# Patient Record
Sex: MALE | Race: BLACK OR AFRICAN AMERICAN | NOT HISPANIC OR LATINO | Employment: FULL TIME | URBAN - METROPOLITAN AREA
[De-identification: names, ages, dates, MRNs, and addresses within clinical notes are randomized per-mention and may not be internally consistent; named-entity substitution may affect disease eponyms.]

---

## 2017-10-24 ENCOUNTER — TRANSCRIBE ORDERS (OUTPATIENT)
Dept: ADMINISTRATIVE | Facility: HOSPITAL | Age: 52
End: 2017-10-24

## 2017-10-24 DIAGNOSIS — M54.50 ACUTE RIGHT-SIDED LOW BACK PAIN WITHOUT SCIATICA: Primary | ICD-10-CM

## 2020-05-13 LAB — EXT SARS-COV-2: NOT DETECTED

## 2020-10-05 ENCOUNTER — TELEPHONE (OUTPATIENT)
Dept: UROLOGY | Facility: AMBULATORY SURGERY CENTER | Age: 55
End: 2020-10-05

## 2021-02-01 ENCOUNTER — TELEPHONE (OUTPATIENT)
Dept: UROLOGY | Facility: CLINIC | Age: 56
End: 2021-02-01

## 2021-02-01 NOTE — TELEPHONE ENCOUNTER
Spoke to pt to convert 2/2 appt w Dr Melody Fernando to virtual - elevated PSA 4 56  Pt wants office visit ASAP    Please review to advise where pt can be rescheduled     I resched for 4/27 2pm BV Dr James Hunter

## 2021-02-02 NOTE — TELEPHONE ENCOUNTER
Patient notified of appt date , time and location - an email with this information was also sent to him at [email or web address]

## 2021-02-03 PROBLEM — R97.20 ELEVATED PSA, LESS THAN 10 NG/ML: Status: ACTIVE | Noted: 2021-02-03

## 2021-02-03 NOTE — PROGRESS NOTES
Problem List Items Addressed This Visit        Other    Elevated PSA, less than 10 ng/ml - Primary    Relevant Medications    bisacodyl (FLEET) 10 MG/30ML ENEM    ciprofloxacin (CIPRO) 500 mg tablet    Other Relevant Orders    PSA, total and free    POCT urine dip (Completed)    POCT Measure PVR (Completed)            I discussed with the patient the discovery of the PSA molecule and its original use in determining the return of prostate cancer after definitive therapy  I described the normal function of the PSA molecule in the reproductive process and also discussed the detection of PSA in the blood  We discussed the controversial history of PSA screening for prostate cancer in the [place] as well as the risk of over detection and over treatment of prostate cancer by way of PSA screening  The patient understands that PSA blood testing is an imperfect way to screen for prostate cancer and that elevated PSA levels in the blood may also be caused by infection, inflammation, prostatic trauma or manipulation, urological procedures, or by benign prostatic enlargement  The role of the digital rectal examination in prostate cancer screening was also discussed and I discussed with him that there is large interobserver variability in the findings of digital rectal examination  We discussed the continued workup of elevated PSA or abnormal digital rectal examination in the form of the performance of a prostate biopsy  The preparation for, and steps of, an office-based transrectal ultrasound of prostate biopsy were described to the patient  Benefits of obtaining tissue for pathologic analysis were discussed with him and the risks of prostate biopsy were also discussed at length  These risks include but are not limited to infection, bleeding, pain, sepsis with need for admission to hospital, risk of change in sexual function, and risk of diagnosis with prostate cancer    Alternatives to prostate biopsy in the form of continued PSA and MEKHI surveillance were also offered to him  All of his questions and concerns were answered and addressed with regard to that detailed above  He will return for prostate biopsy, repeat psa, pvr 0 mL          Assessment and plan:       Please see problem oriented charting for the assessment plan of today's urological complaints      Gema Johnson MD      Chief Complaint     Chief Complaint   Patient presents with    Elevated PSA         History of Present Illness     Candace Chilel February is a 63 y o  referred to us in consultation by Demario Guzman MD, for elevated PSA  Outside PSA reported to be elevated at 4 56    With regard to this complaint it is localized to the prostate  The quality is described as asymptomatic and the severity of this complaint is described as mild  These symptoms have been present for months and the timing is ongoing, states that there was some confusion about visit in October, hence his seeing us now  Previous treatments include none and previous work-up includes evaluation by his PCP  The patient mentions nothing as aggravating and alleviating factors, respectively  The following associated signs and symptoms are mentioned: none  Personal history is significant for no urologic history    Family history is significant for no history of prostate cancer  The following portions of the patient's history were reviewed and updated as appropriate: allergies, current medications, past family history, past medical history, past social history, past surgical history and problem list     Detailed Urologic History     - please refer to HPI    Review of Systems     Review of Systems   Constitutional: Negative  HENT: Negative  Eyes: Negative  Respiratory: Negative  Cardiovascular: Negative  Gastrointestinal: Negative  Endocrine: Negative  Genitourinary: Negative  Musculoskeletal: Negative  Skin: Negative      Allergic/Immunologic: Negative  Neurological: Negative  Hematological: Negative  Psychiatric/Behavioral: Negative  Allergies     No Known Allergies    Physical Exam     Physical Exam  Vitals signs reviewed  Constitutional:       General: He is not in acute distress  Appearance: Normal appearance  He is not ill-appearing, toxic-appearing or diaphoretic  Comments: Pleasant black man   HENT:      Head: Normocephalic and atraumatic  Eyes:      General: No scleral icterus  Right eye: No discharge  Left eye: No discharge  Cardiovascular:      Pulses: Normal pulses  Pulmonary:      Effort: Pulmonary effort is normal    Abdominal:      General: There is no distension  Palpations: There is no mass  Tenderness: There is no abdominal tenderness  Hernia: No hernia is present  Genitourinary:     Comments: Normal sphincter tone, prostate 35-40 grams, smooth, no nodules  Musculoskeletal:         General: No swelling  Skin:     Coloration: Skin is not jaundiced or pale  Neurological:      General: No focal deficit present  Mental Status: He is alert and oriented to person, place, and time  Cranial Nerves: No cranial nerve deficit  Sensory: No sensory deficit  Motor: No weakness  Coordination: Coordination normal       Gait: Gait normal       Deep Tendon Reflexes: Reflexes normal    Psychiatric:         Mood and Affect: Mood normal          Behavior: Behavior normal          Thought Content:  Thought content normal          Judgment: Judgment normal              Vital Signs  Vitals:    02/04/21 1118   BP: 138/70   Pulse: 91   Weight: 97 5 kg (215 lb)   Height: 5' 9" (1 753 m)         Current Medications       Current Outpatient Medications:     amLODIPine (NORVASC) 5 mg tablet, amlodipine 5 mg tablet  one tab daily, Disp: , Rfl:     fluticasone (FLONASE) 50 mcg/act nasal spray, fluticasone propionate 50 mcg/actuation nasal spray,suspension  one puff each nostril daily prn , Disp: , Rfl:     hydrochlorothiazide (HYDRODIURIL) 25 mg tablet, hydrochlorothiazide 25 mg tablet  one tab daily, Disp: , Rfl:     losartan (COZAAR) 100 MG tablet, losartan 100 mg tablet, Disp: , Rfl:     Na Sulfate-K Sulfate-Mg Sulf (Suprep Bowel Prep Kit) 17 5-3 13-1 6 GM/177ML SOLN, Suprep Bowel Prep Kit 17 5 gram-3 13 gram-1 6 gram oral solution, Disp: , Rfl:     bisacodyl (FLEET) 10 MG/30ML ENEM, Insert 30 mL (10 mg total) into the rectum once for 1 dose Use this enema the morning of your prostate biopsy to cleanse your rectum of stool burden , Disp: 1 enema, Rfl: 0    ciprofloxacin (CIPRO) 500 mg tablet, Take 1 tablet (500 mg total) by mouth every 12 (twelve) hours for 1 day Start this medication the day prior to your prostate biopsy, Disp: 2 tablet, Rfl: 0      Active Problems     Patient Active Problem List   Diagnosis    Elevated PSA, less than 10 ng/ml         Past Medical History     Past Medical History:   Diagnosis Date    Elevated PSA     Hypertension          Surgical History     History reviewed  No pertinent surgical history        Family History     Family History   Problem Relation Age of Onset    Hypertension Father          Social History     Social History     Social History     Tobacco Use   Smoking Status Former Smoker   Smokeless Tobacco Never Used   works as an   Used to play soccer for fun  No hobbies involving chemicals or paint or dye      Pertinent Lab Values     No results found for: CREATININE    No results found for: PSA    PSA 4 56 from 9/24/2020      Pertinent Imaging      no imaging for my review

## 2021-02-03 NOTE — PATIENT INSTRUCTIONS
Prostate Gland Needle Biopsy   WHAT YOU NEED TO KNOW:   A prostate gland needle biopsy is a procedure to remove samples of tissue from your prostate gland  The prostate is a gland located just below the bladder and surrounds the urethra (tube that carries urine out of the body)  HOW TO PREPARE:   Before your procedure:   · Arrange to have someone drive you home after your procedure  · Tell your surgeon about all medicines you currently take  He or she will tell you if you need to stop any medicine before your procedure, and when to stop  He or she will tell you which medicines to take or not take on the day of your procedure  The night before your procedure: You may be told not to eat or drink anything after midnight  The day of your procedure:   · You or a close family member will be asked to sign a legal document called a consent form  It gives healthcare providers permission to do the procedure or surgery  It also explains the problems that may happen, and your choices  Make sure all your questions are answered before you sign this form  · Healthcare providers may insert an intravenous tube (IV) into your vein  A vein in the arm is usually chosen  Through the IV tube, you may be given liquids and medicine  · You may be given an enema (liquid medicine put in your rectum) to help empty your bowel  · An anesthesiologist will talk to you before your surgery  You may need medicine to keep you asleep or numb an area of your body during surgery  Tell healthcare providers if you or anyone in your family has had a problem with anesthesia in the past     WHAT WILL HAPPEN:   What will happen:   · You may be given general anesthesia to keep you asleep and free from pain during surgery  You may instead be given spinal anesthesia to numb the surgery area  You may still feel pressure or pushing during surgery, but you will not feel any pain   Numbing gel or shots of numbing medicine may also be given near your prostate to numb the area  · A small tube with a camera will be put into your rectum to show pictures of your prostate on a monitor  A biopsy needle will be put in through your rectum into your prostate gland  A small sample of tissue will be removed with the needle  Your surgeon may take between 6 to 12 samples of tissue from different areas of your prostate gland  A new needle will be used to take each tissue sample  Each sample will be sent to a lab  After your procedure: You will be able to rest until you are fully awake  Do not  get out of bed until your healthcare provider says it is okay  Once healthcare providers see that you are not having any problems, you may be able to go home  CONTACT YOUR HEALTHCARE PROVIDER IF:   · You are late or cannot make it to your procedure  · You have a fever  RISKS:   You may bleed more than expected or get an infection  Your bladder, prostate, urethra, and nearby tissues or organs may be damaged during the procedure  You may have bruises on your rectum  You may have blood in your urine, bowel movements, or semen  If you have prostate cancer, the biopsy may not show the cancer  The biopsy may show cancer when there is no cancer in your prostate gland  You may need another prostate biopsy  CARE AGREEMENT:   You have the right to help plan your care  Learn about your health condition and how it may be treated  Discuss treatment options with your healthcare providers to decide what care you want to receive  You always have the right to refuse treatment  © Copyright [address] Information is for End User's use only and may not be sold, redistributed or otherwise used for commercial purposes  All illustrations and images included in CareNotes® are the copyrighted property of [employer]  or [facility] Marlyn Mcbride   The above information is an  only  It is not intended as medical advice for individual conditions or treatments   Talk to your doctor, nurse or pharmacist before following any medical regimen to see if it is safe and effective for you

## 2021-02-04 ENCOUNTER — TELEPHONE (OUTPATIENT)
Dept: UROLOGY | Facility: CLINIC | Age: 56
End: 2021-02-04

## 2021-02-04 ENCOUNTER — CONSULT (OUTPATIENT)
Dept: UROLOGY | Facility: CLINIC | Age: 56
End: 2021-02-04
Payer: COMMERCIAL

## 2021-02-04 ENCOUNTER — LAB (OUTPATIENT)
Dept: LAB | Facility: HOSPITAL | Age: 56
End: 2021-02-04
Attending: UROLOGY
Payer: COMMERCIAL

## 2021-02-04 VITALS
SYSTOLIC BLOOD PRESSURE: 138 MMHG | HEART RATE: 91 BPM | HEIGHT: 69 IN | DIASTOLIC BLOOD PRESSURE: 70 MMHG | BODY MASS INDEX: 31.84 KG/M2 | WEIGHT: 215 LBS

## 2021-02-04 DIAGNOSIS — R97.20 ELEVATED PSA, LESS THAN 10 NG/ML: ICD-10-CM

## 2021-02-04 DIAGNOSIS — R97.20 ELEVATED PSA, LESS THAN 10 NG/ML: Primary | ICD-10-CM

## 2021-02-04 LAB
POST-VOID RESIDUAL VOLUME, ML POC: 0 ML
SL AMB  POCT GLUCOSE, UA: NORMAL
SL AMB LEUKOCYTE ESTERASE,UA: NORMAL
SL AMB POCT BILIRUBIN,UA: NORMAL
SL AMB POCT BLOOD,UA: NORMAL
SL AMB POCT CLARITY,UA: CLEAR
SL AMB POCT COLOR,UA: YELLOW
SL AMB POCT KETONES,UA: NORMAL
SL AMB POCT NITRITE,UA: NORMAL
SL AMB POCT PH,UA: 7
SL AMB POCT SPECIFIC GRAVITY,UA: 1.01
SL AMB POCT URINE PROTEIN: NORMAL
SL AMB POCT UROBILINOGEN: 0.2

## 2021-02-04 PROCEDURE — 36415 COLL VENOUS BLD VENIPUNCTURE: CPT

## 2021-02-04 PROCEDURE — 84153 ASSAY OF PSA TOTAL: CPT

## 2021-02-04 PROCEDURE — 51798 US URINE CAPACITY MEASURE: CPT | Performed by: UROLOGY

## 2021-02-04 PROCEDURE — 81002 URINALYSIS NONAUTO W/O SCOPE: CPT | Performed by: UROLOGY

## 2021-02-04 PROCEDURE — 99244 OFF/OP CNSLTJ NEW/EST MOD 40: CPT | Performed by: UROLOGY

## 2021-02-04 PROCEDURE — 84154 ASSAY OF PSA FREE: CPT

## 2021-02-04 RX ORDER — CIPROFLOXACIN 500 MG/1
500 TABLET, FILM COATED ORAL EVERY 12 HOURS SCHEDULED
Qty: 2 TABLET | Refills: 0 | Status: SHIPPED | OUTPATIENT
Start: 2021-02-04 | End: 2021-02-05

## 2021-02-04 RX ORDER — SODIUM, POTASSIUM,MAG SULFATES 17.5-3.13G
SOLUTION, RECONSTITUTED, ORAL ORAL
COMMUNITY
End: 2021-05-24

## 2021-02-05 LAB
PSA FREE MFR SERPL: 9.8 %
PSA FREE SERPL-MCNC: 0.46 NG/ML
PSA SERPL-MCNC: 4.7 NG/ML (ref 0–4)

## 2021-03-01 RX ORDER — CEFTRIAXONE 1 G/1
1000 INJECTION, POWDER, FOR SOLUTION INTRAMUSCULAR; INTRAVENOUS ONCE
Status: COMPLETED | OUTPATIENT
Start: 2021-03-04 | End: 2021-03-04

## 2021-03-01 NOTE — PATIENT INSTRUCTIONS
Prostate Biopsy   WHAT YOU NEED TO KNOW:   A prostate biopsy is a procedure to remove samples of tissue from your prostate gland  The prostate is a male sex gland that makes fluid found in semen  It is located just below the bladder  After the samples are removed, they are sent to a lab and tested for cancer  DISCHARGE INSTRUCTIONS:   Seek care immediately if:   · You have heavy bleeding from your rectum  · You urinate very little or not at all  · You have pain from your procedure that gets worse, even after you take pain medicine  Contact your healthcare provider if:   · You have a fever or chills  · You feel pain or burning when you urinate  · Your urine is cloudy or smells bad  · You have questions or concerns about your condition or care  Medicines:  · Medicines  can help decrease pain  You may need medicine to prevent or treat a bacterial infection  Ask how to take pain medicine safely  · Take your medicine as directed  Contact your healthcare provider if you think your medicine is not helping or if you have side effects  Tell him or her if you are allergic to any medicine  Keep a list of the medicines, vitamins, and herbs you take  Include the amounts, and when and why you take them  Bring the list or the pill bottles to follow-up visits  Carry your medicine list with you in case of an emergency  Follow up with your healthcare provider or urologist as directed: You may need to return for more tests or procedures  Write down your questions so you remember to ask them during your visits  © Copyright [address] Information is for End User's use only and may not be sold, redistributed or otherwise used for commercial purposes  All illustrations and images included in CareNotes® are the copyrighted property of [employer]  or [facility] Marlyn Mcneill  The above information is an  only   It is not intended as medical advice for individual conditions or treatments  Talk to your doctor, nurse or pharmacist before following any medical regimen to see if it is safe and effective for you

## 2021-03-01 NOTE — PROGRESS NOTES
Office TRUS-guided Prostate Biopsy Procedure Note    Indication    Elevated PSA    Informed consent   The risks, benefits and alternatives to TRUS-guided prostate biopsy were conveyed to the patient prior to performing the procedure  A discussion of the risks of the procedure included, but was not limited to: pain, hematuria, hematochezia, hematospermia, infection, and the possibility of a non-diagnostic biopsy  The patient was given the opportunity to have his questions answered and there was no perceived barrier to education  Antibiotic prophylaxis   The patient received the following antibiotics at least 30 minutes prior to undergoing biopsy: Cipro and ceftriaxone  The patient was instructed to continue taking the antibiotics as prescribed for a total of 3 days, including the day of biopsy  Rectal cleansing  The patient was instructed to perform an evacuating rectal enema 1-2 hours prior to biopsy  Local anesthesia  Topical 2% lidocaine jelly was applied liberally to the anus and rectum and allowed to dwell for at least 5 min prior to starting the procedure  After insertion of the TRUS probe, 10 mL of 2% lidocaine solution was injected with ultrasound guidance at the  junction of the prostate and seminal vesicles  The anesthetic was allowed to dwell for at least 2 minutes prior to biopsy  Transrectal ultrasonography  The patient was placed in the left lateral decubitus position  After an attentive digital rectal examination, a 7 5 mHz sidefire ultrasound probe was gently inserted into the rectum and biplanar imaging of the prostate was done with the findings noted below  Images were taken of any abnormal findings and also to document prostate size      Bladder  The bladder base appeared normal     Prostate  Digital rectal exam findings:  - Prostate is roughly 20 grams, smooth, no nodules    Ultrasound size measurements:  -Volume:    21 06 cm3    Ultrasound findings:  -Cysts: None  -Masses: None  -Median lobe: absent    Clinical stage (assuming a positive biopsy):   -T1c     TRUS-guided needle biopsy  Using an 18 gauge biopsy needle and ultrasound guidance, the following biopsies were taken:    1 core(s) from the left lateral base  1 core(s) from the left lateral mid-gland  1 core(s) from the right middle base  1 core(s) from the right lateral base  1 core(s) from the left lateral mid-gland  1 core(s) from the left middle mid-gland  1 core(s) from the right middle mid-gland  1 core(s) from the right lateral mid-gland  1 core(s) from the left lateral apex  1 core(s) from the left middle apex  1 core(s) from the right middle apex  1 core(s) from the right lateral apex    Total number of cores: 12                Complications  There were no procedural complications  Disposition  The patient was dismissed to home     Post-procedure instructions: Today he underwent an uncomplicated transrectal ultrasound-guided biopsy of the prostate, following a periprosthetic nerve block  I reviewed the normal postprocedure a course including bleeding per rectum, hematuria, and hematospermia  I instructed him to complete his course of antibiotics as prescribed  Instructed him to call with fever greater than 101, chills, nausea, vomiting, and poorly controlled pain  His followup was scheduled in approximately 2 weeks' time to review the pathology  Biopsy prostate     Date/Time 3/4/2021 8:58 AM     Performed by  Zafar Garay MD     Authorized by Zafar Garay MD      Universal Protocol   Consent: Verbal consent obtained  Written consent obtained    Risks and benefits: risks, benefits and alternatives were discussed  Consent given by: patient  Patient understanding: patient states understanding of the procedure being performed  Patient consent: the patient's understanding of the procedure matches consent given  Procedure consent: procedure consent matches procedure scheduled  Relevant documents: relevant documents present and verified  Test results: test results available and properly labeled  Site marked: the operative site was not marked  Radiology Images displayed and confirmed  If images not available, report reviewed: imaging studies available  Required items: required blood products, implants, devices, and special equipment available  Patient identity confirmed: verbally with patient and provided demographic data        Local anesthesia used: yes     Anesthesia   Local anesthesia used: yes  Local Anesthetic: lidocaine 2% without epinephrine     Sedation   Patient sedated: no        Specimen: yes    Culture: no   Procedure Details   Procedure Notes: Elevated PSA    Informed consent   The risks, benefits and alternatives to TRUS-guided prostate biopsy were conveyed to the patient prior to performing the procedure  A discussion of the risks of the procedure included, but was not limited to: pain, hematuria, hematochezia, hematospermia, infection, and the possibility of a non-diagnostic biopsy  The patient was given the opportunity to have his questions answered and there was no perceived barrier to education  Antibiotic prophylaxis   The patient received the following antibiotics at least 30 minutes prior to undergoing biopsy: Cipro and ceftriaxone  The patient was instructed to continue taking the antibiotics as prescribed for a total of 3 days, including the day of biopsy  Rectal cleansing  The patient was instructed to perform an evacuating rectal enema 1-2 hours prior to biopsy  Local anesthesia  Topical 2% lidocaine jelly was applied liberally to the anus and rectum and allowed to dwell for at least 5 min prior to starting the procedure  After insertion of the TRUS probe, 10 mL of 2% lidocaine solution was injected with ultrasound guidance at the  junction of the prostate and seminal vesicles   The anesthetic was allowed to dwell for at least 2 minutes prior to biopsy  Transrectal ultrasonography  The patient was placed in the left lateral decubitus position  After an attentive digital rectal examination, a 7 5 mHz sidefire ultrasound probe was gently inserted into the rectum and biplanar imaging of the prostate was done with the findings noted below  Images were taken of any abnormal findings and also to document prostate size  Bladder  The bladder base appeared normal     Prostate  Digital rectal exam findings:  - Prostate is roughly 20 grams, smooth, no nodules    Ultrasound size measurements:  -Volume:    21 06 cm3    Ultrasound findings:  -Cysts: None  -Masses: None  -Median lobe: absent    Clinical stage (assuming a positive biopsy):   -T1c     TRUS-guided needle biopsy  Using an 18 gauge biopsy needle and ultrasound guidance, the following biopsies were taken:    1 core(s) from the left lateral base  1 core(s) from the left lateral mid-gland  1 core(s) from the right middle base  1 core(s) from the right lateral base  1 core(s) from the left lateral mid-gland  1 core(s) from the left middle mid-gland  1 core(s) from the right middle mid-gland  1 core(s) from the right lateral mid-gland  1 core(s) from the left lateral apex  1 core(s) from the left middle apex  1 core(s) from the right middle apex  1 core(s) from the right lateral apex    Total number of cores: 12                Complications  There were no procedural complications  Disposition  The patient was dismissed to home     Post-procedure instructions: Today he underwent an uncomplicated transrectal ultrasound-guided biopsy of the prostate, following a periprosthetic nerve block  I reviewed the normal postprocedure a course including bleeding per rectum, hematuria, and hematospermia  I instructed him to complete his course of antibiotics as prescribed  Instructed him to call with fever greater than 101, chills, nausea, vomiting, and poorly controlled pain   His followup was scheduled in approximately 2 weeks' time to review the pathology    Patient Transportation: confirmed  Patient tolerance: patient tolerated the procedure well with no immediate complications

## 2021-03-04 ENCOUNTER — PROCEDURE VISIT (OUTPATIENT)
Dept: UROLOGY | Facility: CLINIC | Age: 56
End: 2021-03-04
Payer: COMMERCIAL

## 2021-03-04 DIAGNOSIS — R97.20 ELEVATED PSA, LESS THAN 10 NG/ML: Primary | ICD-10-CM

## 2021-03-04 PROCEDURE — G0416 PROSTATE BIOPSY, ANY MTHD: HCPCS | Performed by: PATHOLOGY

## 2021-03-04 PROCEDURE — 55700 PR BIOPSY OF PROSTATE,NEEDLE/PUNCH: CPT | Performed by: UROLOGY

## 2021-03-04 PROCEDURE — 76942 ECHO GUIDE FOR BIOPSY: CPT | Performed by: UROLOGY

## 2021-03-04 PROCEDURE — 96372 THER/PROPH/DIAG INJ SC/IM: CPT

## 2021-03-04 RX ADMIN — CEFTRIAXONE 1000 MG: 1 INJECTION, POWDER, FOR SOLUTION INTRAMUSCULAR; INTRAVENOUS at 08:34

## 2021-03-04 NOTE — LETTER
March 4, 2021     Sabina Edwards MD  [address] [facility] [address]    Patient: Alonso Naranjo February   YOB: 1965   Date of Visit: 3/4/2021       Dear Dr Jatinder Heck:    Thank you for referring Alonso Naranjo February to me for evaluation  Below are my notes for this consultation  If you have questions, please do not hesitate to call me  I look forward to following your patient along with you  Sincerely,        Hugo Lomax MD        CC: No Recipients  Hugo Lomax MD  3/4/2021  8:59 AM  Sign when Signing Visit  Office TRUS-guided Prostate Biopsy Procedure Note    Indication    Elevated PSA    Informed consent   The risks, benefits and alternatives to TRUS-guided prostate biopsy were conveyed to the patient prior to performing the procedure  A discussion of the risks of the procedure included, but was not limited to: pain, hematuria, hematochezia, hematospermia, infection, and the possibility of a non-diagnostic biopsy  The patient was given the opportunity to have his questions answered and there was no perceived barrier to education  Antibiotic prophylaxis   The patient received the following antibiotics at least 30 minutes prior to undergoing biopsy: Cipro and ceftriaxone  The patient was instructed to continue taking the antibiotics as prescribed for a total of 3 days, including the day of biopsy  Rectal cleansing  The patient was instructed to perform an evacuating rectal enema 1-2 hours prior to biopsy  Local anesthesia  Topical 2% lidocaine jelly was applied liberally to the anus and rectum and allowed to dwell for at least 5 min prior to starting the procedure  After insertion of the TRUS probe, 10 mL of 2% lidocaine solution was injected with ultrasound guidance at the  junction of the prostate and seminal vesicles  The anesthetic was allowed to dwell for at least 2 minutes prior to biopsy      Transrectal ultrasonography  The patient was placed in the left lateral decubitus position  After an attentive digital rectal examination, a 7 5 mHz sidefire ultrasound probe was gently inserted into the rectum and biplanar imaging of the prostate was done with the findings noted below  Images were taken of any abnormal findings and also to document prostate size  Bladder  The bladder base appeared normal     Prostate  Digital rectal exam findings:  - Prostate is roughly 20 grams, smooth, no nodules    Ultrasound size measurements:  -Volume:    21 06 cm3    Ultrasound findings:  -Cysts: None  -Masses: None  -Median lobe: absent    Clinical stage (assuming a positive biopsy):   -T1c     TRUS-guided needle biopsy  Using an 18 gauge biopsy needle and ultrasound guidance, the following biopsies were taken:    1 core(s) from the left lateral base  1 core(s) from the left lateral mid-gland  1 core(s) from the right middle base  1 core(s) from the right lateral base  1 core(s) from the left lateral mid-gland  1 core(s) from the left middle mid-gland  1 core(s) from the right middle mid-gland  1 core(s) from the right lateral mid-gland  1 core(s) from the left lateral apex  1 core(s) from the left middle apex  1 core(s) from the right middle apex  1 core(s) from the right lateral apex    Total number of cores: 12                Complications  There were no procedural complications  Disposition  The patient was dismissed to home     Post-procedure instructions: Today he underwent an uncomplicated transrectal ultrasound-guided biopsy of the prostate, following a periprosthetic nerve block  I reviewed the normal postprocedure a course including bleeding per rectum, hematuria, and hematospermia  I instructed him to complete his course of antibiotics as prescribed  Instructed him to call with fever greater than 101, chills, nausea, vomiting, and poorly controlled pain  His followup was scheduled in approximately 2 weeks' time to review the pathology            Biopsy prostate     Date/Time 3/4/2021 8:58 AM     Performed by  Veronika Mendoza MD     Authorized by Veronika Mendoza MD      Universal Protocol   Consent: Verbal consent obtained  Written consent obtained  Risks and benefits: risks, benefits and alternatives were discussed  Consent given by: patient  Patient understanding: patient states understanding of the procedure being performed  Patient consent: the patient's understanding of the procedure matches consent given  Procedure consent: procedure consent matches procedure scheduled  Relevant documents: relevant documents present and verified  Test results: test results available and properly labeled  Site marked: the operative site was not marked  Radiology Images displayed and confirmed  If images not available, report reviewed: imaging studies available  Required items: required blood products, implants, devices, and special equipment available  Patient identity confirmed: verbally with patient and provided demographic data        Local anesthesia used: yes     Anesthesia   Local anesthesia used: yes  Local Anesthetic: lidocaine 2% without epinephrine     Sedation   Patient sedated: no        Specimen: yes    Culture: no   Procedure Details   Procedure Notes: Elevated PSA    Informed consent   The risks, benefits and alternatives to TRUS-guided prostate biopsy were conveyed to the patient prior to performing the procedure  A discussion of the risks of the procedure included, but was not limited to: pain, hematuria, hematochezia, hematospermia, infection, and the possibility of a non-diagnostic biopsy  The patient was given the opportunity to have his questions answered and there was no perceived barrier to education  Antibiotic prophylaxis   The patient received the following antibiotics at least 30 minutes prior to undergoing biopsy: Cipro and ceftriaxone   The patient was instructed to continue taking the antibiotics as prescribed for a total of 3 days, including the day of biopsy  Rectal cleansing  The patient was instructed to perform an evacuating rectal enema 1-2 hours prior to biopsy  Local anesthesia  Topical 2% lidocaine jelly was applied liberally to the anus and rectum and allowed to dwell for at least 5 min prior to starting the procedure  After insertion of the TRUS probe, 10 mL of 2% lidocaine solution was injected with ultrasound guidance at the  junction of the prostate and seminal vesicles  The anesthetic was allowed to dwell for at least 2 minutes prior to biopsy  Transrectal ultrasonography  The patient was placed in the left lateral decubitus position  After an attentive digital rectal examination, a 7 5 mHz sidefire ultrasound probe was gently inserted into the rectum and biplanar imaging of the prostate was done with the findings noted below  Images were taken of any abnormal findings and also to document prostate size  Bladder  The bladder base appeared normal     Prostate  Digital rectal exam findings:  - Prostate is roughly 20 grams, smooth, no nodules    Ultrasound size measurements:  -Volume:    21 06 cm3    Ultrasound findings:  -Cysts: None  -Masses: None  -Median lobe: absent    Clinical stage (assuming a positive biopsy):   -T1c     TRUS-guided needle biopsy  Using an 18 gauge biopsy needle and ultrasound guidance, the following biopsies were taken:    1 core(s) from the left lateral base  1 core(s) from the left lateral mid-gland  1 core(s) from the right middle base  1 core(s) from the right lateral base  1 core(s) from the left lateral mid-gland  1 core(s) from the left middle mid-gland  1 core(s) from the right middle mid-gland  1 core(s) from the right lateral mid-gland  1 core(s) from the left lateral apex  1 core(s) from the left middle apex  1 core(s) from the right middle apex    1 core(s) from the right lateral apex    Total number of cores: 12                Complications  There were no procedural complications  Disposition  The patient was dismissed to home     Post-procedure instructions: Today he underwent an uncomplicated transrectal ultrasound-guided biopsy of the prostate, following a periprosthetic nerve block  I reviewed the normal postprocedure a course including bleeding per rectum, hematuria, and hematospermia  I instructed him to complete his course of antibiotics as prescribed  Instructed him to call with fever greater than 101, chills, nausea, vomiting, and poorly controlled pain  His followup was scheduled in approximately 2 weeks' time to review the pathology    Patient Transportation: confirmed  Patient tolerance: patient tolerated the procedure well with no immediate complications

## 2021-03-17 NOTE — PATIENT INSTRUCTIONS
Prostate Cancer   WHAT YOU NEED TO KNOW:   What do I need to know about prostate cancer? The prostate is the male sex gland that helps make semen  It is about the size of a walnut and wraps around the urethra  The urethra is the tube that carries urine from the bladder to the end of the penis  In most cases, prostate cancer is slow growing  What increases my risk for prostate cancer? · Age older than 48 years    · Father or brother with prostate cancer    · Regularly eating fried or high-fat foods    · Eating few fruits and vegetables    · Exposure to high amounts of certain chemicals, such as in cigarette smoke or alkaline batteries    · A sexually transmitted infection (STI)    What are the signs and symptoms of prostate cancer? You may have no symptoms during the early stages  In the later stages, you may have any of the following:  · Trouble starting or stopping the flow of urine    · Feeling the need to urinate often, especially at night    · Pain or a burning feeling when you urinate or ejaculate semen    · Trouble having an erection    · Blood in your urine or semen    · Not being able to urinate at all    · Pain or stiffness in your lower back, hips, or upper thighs    How is prostate cancer diagnosed? · Digital rectal examination (MEKHI)  is a test to check the size and shape of your prostate  Your healthcare provider will insert a gloved finger into your rectum to feel if your prostate is large, firm, or has lumps  · Prostate-specific antigen (PSA)  is a blood test to check PSA levels  These levels may be increased if you have prostate cancer  · A biopsy  is used to take a sample of your prostate gland to be tested for cancer  The sample may also help healthcare providers determine the stage of your cancer  How is prostate cancer treated? If you have early stage cancer, your healthcare provider may recommend that you have frequent tests and regular follow-up visits to watch for changes  You may also need any of the following:  · Hormone therapy  is medicine used to decrease testosterone (male hormone) levels  · Radiation therapy  is used to kill cancer cells with high-energy x-ray beams  You may receive radiation therapy from outside your body or from small beads or rods placed inside your prostate  · Surgery  may be needed, depending on the stage of the cancer  Part or all of your prostate may be removed  You may also need to have some lymph nodes taken out  This may help keep the cancer from spreading to other parts of your body  Your healthcare provider may recommend a combination of radiation therapy and surgery  What can I do to manage my prostate cancer? · Do not smoke  Nicotine can damage blood vessels and make it more difficult to manage your prostate cancer  Smoking also increases your risk for new or returning cancer and delays healing after treatment  Do not use e-cigarettes or smokeless tobacco in place of cigarettes or to help you quit  They still contain nicotine  Ask your healthcare provider for information if you currently smoke and need help quitting  · Limit or do not drink alcohol as directed  A drink is 12 ounces of beer, 1½ ounces of liquor, or 5 ounces of wine  · Eat a variety of healthy foods  Healthy foods include fruits, vegetables, whole-grain breads, low-fat dairy products, beans, lean meats, and fish  Your healthcare provider may also recommend changes to the amounts of calcium and vitamin D you have each day  · Manage your weight  Obesity may increase your risk for problems from prostate cancer  Limit or do not have high-calorie foods or drinks  · Exercise as directed  Exercise may help you recover after treatment and may help prevent your prostate cancer from returning  Exercise can also help you manage your weight  Try to get at least 30 minutes of exercise 5 days a week, such as walking  · Ask about sexual activity    Ask your healthcare provider when it is safe for you to start having sex after your treatment  Medicines may be given if you have trouble getting or maintaining an erection  · Manage incontinence  You may have incontinence (trouble controlling when you urinate) after treatment  Ask your healthcare provider for information on managing urinary incontinence  You may be able to gain control over your urination with techniques or medicines  · Drink liquids as directed  Ask how much liquid to drink each day and which liquids are best for you  Drink extra liquids to prevent dehydration  You will also need to replace fluid if you are vomiting or have diarrhea from cancer treatments  Call your local emergency number (911 in the [address]) if:   · Your leg feels warm, tender, and painful  It may look swollen and red  · You suddenly feel lightheaded and short of breath  · You have chest pain when you take a deep breath or cough  · You cough up blood  When should I call my doctor? · You have a fever  · You feel you cannot cope with your illness  · You have blood in your urine or have trouble urinating  · You have pain that does not get better after you take your medicine  · You have questions or concerns about your condition or care  CARE AGREEMENT:   You have the right to help plan your care  Learn about your health condition and how it may be treated  Discuss treatment options with your healthcare providers to decide what care you want to receive  You always have the right to refuse treatment  The above information is an  only  It is not intended as medical advice for individual conditions or treatments  Talk to your doctor, nurse or pharmacist before following any medical regimen to see if it is safe and effective for you  © Copyright [address] Information is for End User's use only and may not be sold, redistributed or otherwise used for commercial purposes   All illustrations and images included in CareNotes® are the copyrighted property of [employer]EL[employer]  or [address] [place] [address] Assisted Laparoscopic Prostatectomy   WHAT YOU NEED TO KNOW:   What do I need to know about robot-assisted laparoscopic prostatectomy (RALP)? RALP is surgery to remove your prostate gland through small incisions in your abdomen  RALP is done with a machine that is controlled by your surgeon  The machine has mechanical arms that use small tools to remove your prostate  How do I prepare for RALP? · Your surgeon will tell you how to prepare  Tell him or her about any other surgeries or cancer treatments you had  Tell him or her if you have had bleeding problems  · Tell your surgeon about all medicines you currently take  He or she will tell you if you need to stop any medicine before surgery, and when to stop  · Tell your surgeon about any allergies you have  Include allergic reactions to medicine, antibiotics, or anesthesia  You may need to take antibiotic medicine to help prevent an infection caused by bacteria  You may get antibiotic medicine before and after your surgery  · Arrange to have someone drive you home after surgery and stay with you for 24 hours  · You may need to have tests done before surgery, such as blood tests or a chest x-ray  · You may have to stop eating solid food for up to 2 days before your surgery  You can drink water, broth, apple juice, or lemon-lime soft drinks  You may also suck on ice chips or eat gelatin  You may be given medicine to drink or an enema to clean out your bowel  What will happen during RALP? · A urinary catheter will be put into your bladder to drain your urine  The robotic arms place a laparoscope and other tools inside your abdomen through small cuts  A laparoscope is a long, thin tube with a light and camera on the end  A gas called carbon dioxide is pumped into your abdomen to inflate it   This gives your surgeon room so he or she can see your prostate better  · Your surgeon will use the camera to see inside your abdomen  He or she will guide the robotic arms to remove the prostate  The prostate will be removed through one of the small incisions in your abdomen  Lymph nodes may also be removed  Your surgeon will use the robotic arms to stitch your incisions closed  What should I expect after RALP?   · Drains (thin rubber tubes) may be used to drain fluid from around your incision  The drains will be taken out when the surgery area stops draining  · A Calixto catheter is a thin tube inserted through your urethra and moved into your bladder  The catheter is used to drain urine into a bag  Healthcare providers will remove the catheter when you no longer need it  What are the risks of RALP? · You may bleed more than expected or get an infection  Your bladder, ureters (tubes that drain urine from your body), intestines, or rectum could be damaged during surgery  After RALP, you may have problems urinating or having bowel movements  You may need more surgery to treat urination problems  You may not be able to have an erection after surgery  Your stitches may come apart  You may have a hernia or develop an abscess (deep infection)  · You may get a blood clot in your arm or leg  The clot may travel to your heart or brain and cause life-threatening problems, such as a heart attack or stroke  Even with surgery, cancer may come back  CARE AGREEMENT:   You have the right to help plan your care  Learn about your health condition and how it may be treated  Discuss treatment options with your healthcare providers to decide what care you want to receive  You always have the right to refuse treatment  The above information is an  only  It is not intended as medical advice for individual conditions or treatments   Talk to your doctor, nurse or pharmacist before following any medical regimen to see if it is safe and effective for you  © Copyright [address] Information is for End User's use only and may not be sold, redistributed or otherwise used for commercial purposes   All illustrations and images included in CareNotes® are the copyrighted property of [employer]  or [address] [place], [place] [address]esdras franky

## 2021-03-17 NOTE — PROGRESS NOTES
Problem List Items Addressed This Visit        Genitourinary    Prostate cancer ([address] )    Relevant Orders    NM bone scan whole body    XR chest pa & lateral    Comprehensive metabolic panel    CBC and differential    MRI prostate multiparametric wo w contrast    Case request operating room: PROSTATECTOMY RADICAL AND PELVIC LYMPH NODE DISSECTION LAPAROSCOPIC W/ ROBOTICS (Completed)       Other    Elevated PSA, less than 10 ng/ml - Primary            Discussion:    I had a discussion with the patient regarding the natural history and treatment options for prostate cancer and the potential need for multimodal treatments  Active surveillance, surgical excision in the form of open or robotic surgery, and radiation therapies plus or minus androgen deprivation therapy were discussed at length with the patient  With regard to multimodal therapy discussion this could include surgical excision with postoperative radiation therapy (RT) +/-androgen deprivation therapy (ADT) if adverse pathology or RT with long-term ADT  I discussed robot assisted laparoscopic radical prostatectomy and bilateral pelvic lymph node dissection in depth with the patient  The Singly SI and Xi surgical platforms were reviewed with the patient  I discussed that surgery has several potential advantages compared with radiation including the more accurate prognostic, pathologic information afforded by the surgical pathology, the more immediate indication of effective treatment as indicated by an undetectable PSA, as well as the relatively easier use of adjuvant or salvage radiotherapy postoperatively if the need arises  I discussed that in comparison with open surgery that robotic prostatectomy has the benefits of improved convalescence and decreased blood loss and appears to have equivalent cancer control rates and quality-of-life side effects such similar rates of urinary incontinence and erectile dysfunction       I discussed that most men stay in the hospital for 1-2 days and go home with a catheter for 7-10 days  Postoperatively, I discussed an aggressive penile rehabilitation approach with regular use of Viagra 50 mg every other day as well as possible use of a vacuum device to improve arterial oxygenation, which may improve nerve recovery  I discussed the side effects of surgery including stress incontinence, and I counseled him that most men leak urine immediately after surgery  I expect a stress incontinence rate at six months in the 10%-15% range, improving to 5%-10% in one year  I did communicate that there additional treatments for urinary incontinence that may be persistent in the post-prostatectomy setting  In terms of erectile dysfunction, we discussed the normal physiology of the erectile response as well as discussing the anatomy of the cavernous nerves  I drew a diagram for him outlining the pelvic anatomy with regard to the bladder, prostate, and parasympathetic nerve plexus running laterally to the prostate  I will do everything that I safely can to attempt a nerve-sparing procedure if deemed to be appropriate intraoperatively  I did discuss with him that my priority is 1st to rid him of his cancer, 2nd to keep him dry, and 3rd to keep him with excellent sexual function  He does understand that his sexual function postoperatively is also a function of his current sexual function preoperatively and is closely tied to his overall general health  He understands that there are other therapies for erectile dysfunction the post-prostatectomy setting including injections and penile prosthesis placement  Additionally, I did stress to him that his post-operative sexual function will not be as good as his preoperative sexual function due to the nature of prostate surgery      I discussed other surgical complications including, but not limited to, a bladder neck contracture rate in the 5%-10% range any time after surgery, a rectal injury rate of 1% or less, an open conversion rate in the 1% range, a lymphocele causing symptoms and/or requiring intervention in the 5% range, and a transfusion rate in the 1% to 2% range  Additionally, he is to expect some degree of penile remodeling and loss of penile length after surgery  I also discussed with him radiation therapy but I feel that he is too young for this  He is also trying to conceive via IVF, I have recommended that he speak to his reproductive endocrinologist about banking further sperm  He is also considering another opinion at [facility] or [facility]    Assessment and plan:       Please see problem oriented charting for the assessment plan of today's urological complaints      Marizol Holt MD      Chief Complaint     Chief Complaint   Patient presents with    Prostate Biopsy Results         History of Present Illness     Chavo Junior February is a 63 y o  man with elevated psa, biopsy has shown a mixture of [place] 3+4=7 and Sond[place] 8 disease  Some hematuria, some blood in the semen, good sexual function, no fevers no chills, extensive discussion as above    The following portions of the patient's history were reviewed and updated as appropriate: allergies, current medications, past family history, past medical history, past social history, past surgical history and problem list       Detailed Urologic History     - please refer to HPI    Review of Systems     Review of Systems   Constitutional: Negative  HENT: Negative  Eyes: Negative  Respiratory: Negative  Cardiovascular: Negative  Gastrointestinal: Negative  Endocrine: Negative  Genitourinary:        As per HPI   Musculoskeletal: Negative  Skin: Negative  Allergic/Immunologic: Negative  Neurological: Negative  Hematological: Negative  Psychiatric/Behavioral: Negative                Allergies     No Known Allergies    Physical Exam     Physical Exam  Vitals signs reviewed  Constitutional:       General: He is not in acute distress  Appearance: Normal appearance  He is not ill-appearing, toxic-appearing or diaphoretic  HENT:      Head: Normocephalic and atraumatic  Eyes:      General: No scleral icterus  Right eye: No discharge  Left eye: No discharge  Cardiovascular:      Pulses: Normal pulses  Pulmonary:      Effort: Pulmonary effort is normal    Abdominal:      General: There is no distension  Palpations: There is no mass  Tenderness: There is no abdominal tenderness  Hernia: No hernia is present  Musculoskeletal:         General: No swelling  Skin:     General: Skin is warm  Neurological:      General: No focal deficit present  Mental Status: He is alert  Psychiatric:         Mood and Affect: Mood normal          Behavior: Behavior normal          Thought Content:  Thought content normal          Judgment: Judgment normal              Vital Signs  Vitals:    03/18/21 0958   BP: 112/82   Pulse: 68   Weight: 96 3 kg (212 lb 4 8 oz)   Height: 5' 9" (1 753 m)         Current Medications       Current Outpatient Medications:     amLODIPine (NORVASC) 5 mg tablet, amlodipine 5 mg tablet  one tab daily, Disp: , Rfl:     fluticasone (FLONASE) 50 mcg/act nasal spray, fluticasone propionate 50 mcg/actuation nasal spray,suspension  one puff each nostril daily prn , Disp: , Rfl:     hydrochlorothiazide (HYDRODIURIL) 25 mg tablet, hydrochlorothiazide 25 mg tablet  one tab daily, Disp: , Rfl:     losartan (COZAAR) 100 MG tablet, losartan 100 mg tablet, Disp: , Rfl:     bisacodyl (FLEET) 10 MG/30ML ENEM, Insert 30 mL (10 mg total) into the rectum once for 1 dose Use this enema the morning of your prostate biopsy to cleanse your rectum of stool burden , Disp: 1 enema, Rfl: 0    Na Sulfate-K Sulfate-Mg Sulf (Suprep Bowel Prep Kit) 17 5-3 13-1 6 GM/177ML SOLN, Suprep Bowel Prep Kit 17 5 gram-3 13 gram-1 6 gram oral solution, Disp: , Rfl:       Active Problems     Patient Active Problem List   Diagnosis    Elevated PSA, less than 10 ng/ml    Prostate cancer [facility])         Past Medical History     Past Medical History:   Diagnosis Date    Hypertension          Surgical History     History reviewed  No pertinent surgical history  Family History     Family History   Problem Relation Age of Onset    Hypertension Father          Social History     Social History     Social History     Tobacco Use   Smoking Status Former Smoker   Smokeless Tobacco Never Used         Pertinent Lab Values     No results found for: CREATININE    Lab Results   Component Value Date    PSA 4 7 (H) 02/04/2021       Final Diagnosis   A  Prostate, Right Lateral Base:  - Benign prostatic tissue       B  Prostate, Right Medial Base:  - Benign prostatic tissue        C  Prostate, Right Lateral Mid:  - Benign prostatic tissue        D  Prostate, Right Medial Mid:  - Benign prostatic tissue        E  Prostate, Right Lateral [place]:  - Benign prostatic tissue        F  Prostate, Right Medial [place]:  - Benign prostatic tissue        G  Prostate, Left Lateral Base:  - Prostatic adenocarcinoma, Sondra score 7, 3+4, Prognostic Grade Group 2, discontinuously involving 10% of 1 of 1 core  - Percentage pattern 4: 10%  - Periprostatic fat invasion: not identified  - Lymph-vascular invasion:  not identified  - Perineural invasion: not identified  - Additional Pathologic Findings: acute and chronic inflammation      H  Prostate, Left Medial Base:  - Benign prostatic tissue with chronic inflammation       I  Prostate, Left Lateral Mid :  - Prostatic adenocarcinoma, Sondra score 7, 3+4, Prognostic Grade Group 2, discontinuously involving 60% of 1 of 1 core  - Percentage pattern 4: 20%  - Periprostatic fat invasion: not identified  - Lymph-vascular invasion:  not identified  - Perineural invasion: not identified  - Additional Pathologic Findings: acute and chronic inflammation     J  Prostate, Left Medial Mid:  - Prostatic adenocarcinoma with mucinous differentiation, Sondra score 8, 4+4, Prognostic Grade Group 4, continuously involving 40% of 1 of 1 core  - Periprostatic fat invasion: not identified  - Lymph-vascular invasion:  not identified  - Perineural invasion: not identified  - Additional Pathologic Findings: None identified       K  Prostate, Left Lateral [place]:  - Prostatic adenocarcinoma, [place] score 7, 3+4, Prognostic Grade Group 2, involving 5% of 1 of 1 core  - Periprostatic fat invasion: not identified  - Lymph-vascular invasion:  not identified  - Perineural invasion: not identified  - Additional Pathologic Findings: acute and chronic inflammation       L  Prostate, Left Medial [place] :  - Benign prostatic tissue        Note: Block J/unstained slides from Block J are suggested if additional studies are indicated (at least 0 5 mm of tumor for Prolaris)  This case was reviewed at the intradepartmental  conference     Dr Linwood Terrazas is notified of the diagnosis in EPIC via [address] on 04/03/2021  at 12 20 pm    Electronically signed by Becca Smith MD on 3/5/2021 at 12:36 PM           Pertinent Imaging      No new imaging for my review

## 2021-03-18 ENCOUNTER — OFFICE VISIT (OUTPATIENT)
Dept: UROLOGY | Facility: CLINIC | Age: 56
End: 2021-03-18
Payer: COMMERCIAL

## 2021-03-18 VITALS
BODY MASS INDEX: 31.44 KG/M2 | DIASTOLIC BLOOD PRESSURE: 82 MMHG | HEIGHT: 69 IN | WEIGHT: 212.3 LBS | SYSTOLIC BLOOD PRESSURE: 112 MMHG | HEART RATE: 68 BPM

## 2021-03-18 DIAGNOSIS — R97.20 ELEVATED PSA, LESS THAN 10 NG/ML: Primary | ICD-10-CM

## 2021-03-18 DIAGNOSIS — C61 PROSTATE CANCER (HCC): ICD-10-CM

## 2021-03-18 PROCEDURE — 99215 OFFICE O/P EST HI 40 MIN: CPT | Performed by: UROLOGY

## 2021-03-18 RX ORDER — GABAPENTIN 100 MG/1
300 CAPSULE ORAL ONCE
Status: CANCELLED | OUTPATIENT
Start: 2021-03-18 | End: 2021-03-18

## 2021-03-18 RX ORDER — ACETAMINOPHEN 325 MG/1
975 TABLET ORAL ONCE
Status: CANCELLED | OUTPATIENT
Start: 2021-03-18 | End: 2021-03-18

## 2021-03-19 ENCOUNTER — TELEPHONE (OUTPATIENT)
Dept: UROLOGY | Facility: CLINIC | Age: 56
End: 2021-03-19

## 2021-03-19 NOTE — TELEPHONE ENCOUNTER
Pt called asking to speak with Ana Cristina Ford has additional questions pt would not relay them to me

## 2021-03-22 ENCOUNTER — PREP FOR PROCEDURE (OUTPATIENT)
Dept: UROLOGY | Facility: CLINIC | Age: 56
End: 2021-03-22

## 2021-03-22 DIAGNOSIS — C61 PROSTATE CANCER (HCC): Primary | ICD-10-CM

## 2021-03-22 NOTE — TELEPHONE ENCOUNTER
Called and spoke with patient  Patient inquiring more information regarding his prostate cancer  He would like to know what stage his prostate cancer is  Patient has no further questions aside from this  Please advise  Patient expecting call from Sweta Hanley today to discuss setting up surgery  Please call at your earliest convenience

## 2021-03-22 NOTE — TELEPHONE ENCOUNTER
Spoke with patient and he is sched for 6/7 at the [facility] with Dr Clark  I offered him a sooner date but her prefers to have this done in June  He is aware he needs a , the hospital will contact him day prior with time of  Arrival and he will stay for observation  He is sched for clearance with his PCP on 5/15 and will go for PATs 3 wks prior and covid testing 1 wk prior to surgery  We went over bowel prep and advised 7 day hold of aspirin products, multivitamins and fish oils  He is still pending and wanting to speak with Dr Clark in regards to further questions and staging  I am mailing him a surgical packet and he will contact our office with any questions or concerns

## 2021-03-30 ENCOUNTER — TELEPHONE (OUTPATIENT)
Dept: UROLOGY | Facility: AMBULATORY SURGERY CENTER | Age: 56
End: 2021-03-30

## 2021-03-30 NOTE — TELEPHONE ENCOUNTER
In order for diagnostic images to be used for the surgery patient will need to have his MRI performed here at [facility]

## 2021-03-30 NOTE — TELEPHONE ENCOUNTER
Patients insurance requires him to go to a free standing facility for imaging  He is on for an MRI prostate and just wanted to make sure we can accept imaging from another place for this? Please clarify with Dr Clark    Thanks  Sarbjit Gustafson

## 2021-03-30 NOTE — TELEPHONE ENCOUNTER
A letter of medical necessity will need to be done for me to try to get this approved with his insurance  It will need to state why he can't have this done at an outside facility

## 2021-04-05 ENCOUNTER — TELEPHONE (OUTPATIENT)
Dept: UROLOGY | Facility: AMBULATORY SURGERY CENTER | Age: 56
End: 2021-04-05

## 2021-04-05 NOTE — TELEPHONE ENCOUNTER
Leah Muro rescheduled this peer to peer for this Wednesday at 11:30am  The doctor will call your number directly    Thanks  Nupur Kelly

## 2021-04-05 NOTE — TELEPHONE ENCOUNTER
This is a 59-year-old male patient of Dr Miguelina Harmon the presented to the office with a an elevated PSA  He did have a prostate biopsy which showed a mixture of [place] 7 (3+4) and [place] 8 (4+4) prostate cancer  He is scheduled for  Robot assisted laparoscopic radical prostatectomy   06/07/2021  In evaluation for staging purposes bone scan and MRI of prostate ordered,  Unfortunately, CT will not be sufficient enough of evaluation for extraprostatic disease/progression

## 2021-04-05 NOTE — TELEPHONE ENCOUNTER
I started this peer to peer note and it looks like it was re-scheduled for you  later this week  Figured you could use it once approval is obtained

## 2021-04-05 NOTE — TELEPHONE ENCOUNTER
Gera Rivera Monday  This patients MRI prostate and bone scan was denied with the insurance  He is on for this tomorrow and they have an 11:45am today for a peer to peer  The doctor will call my line directly and will make sure you are available    Thanks  Micaela Zavala

## 2021-04-07 NOTE — TELEPHONE ENCOUNTER
Meena Norton  I spoke with Crys at this facility and they are not compatible with our pacs viewer  What is the next step with this do you have a number to call the doctor back? Or do I have to schedule another peer to peer?   Thanks  Sascha Kapoor

## 2021-04-07 NOTE — TELEPHONE ENCOUNTER
Thank you for confirming that, should be helpful for our case for approval for in network imaging  I do not have a callback number, so I guess another peer to peer?

## 2021-04-07 NOTE — TELEPHONE ENCOUNTER
MRI and bone scan studies themselves are approved however only for non-hospital location  Can we please confirm with "[facility] at [address] [facility]" compatibility of images on our pacs viewer  Ideally Dr Juanita Daugherty would like images done in  network pacs system but this has not been approved unless we can confirm the above MRI location is incompatible

## 2021-04-08 NOTE — TELEPHONE ENCOUNTER
I spoke with Jessy Duke at [facility] and provided her with this information and she sent a message to the doctor regarding this   Should hear back with an approval

## 2021-04-15 NOTE — TELEPHONE ENCOUNTER
Henri Diaz from [facility] she is a supervisor called me back and I explained the whole situation with her and told her this has been going on now for two weeks and she approved the bone scan and MRI to be done at [facility]  I will call patient to r/s this    Thanks  Devin Pritchett

## 2021-04-15 NOTE — TELEPHONE ENCOUNTER
I spoke with Tata Bernstein, JUDITH at [facility] and explained for the third time why the MRI prostate can not be done at a free standing facility due to our pacs viewer is not compatible  They still will not approve this to be done at [facility] since the peer to peer doctor upheld and said its not medically necessary for him to have this done at a non free standing facility  They approved the bone scan to be done here due to the free standing facility not being able to perform this  I am not sure what else we can do for this patient with his insurance  A written appeal can be done which could take up to 30 days or more to hear back from them  We have sent a letter, did a peer to peer and was under the impression this would be approved since they are not compatible  This is not the case

## 2021-05-01 ENCOUNTER — HOSPITAL ENCOUNTER (OUTPATIENT)
Dept: RADIOLOGY | Facility: HOSPITAL | Age: 56
Discharge: HOME | End: 2021-05-01
Attending: UROLOGY
Payer: COMMERCIAL

## 2021-05-01 ENCOUNTER — TRANSCRIBE ORDERS (OUTPATIENT)
Dept: RADIOLOGY | Facility: HOSPITAL | Age: 56
End: 2021-05-01

## 2021-05-01 DIAGNOSIS — C61 PROSTATE CANCER (HCC): ICD-10-CM

## 2021-05-01 PROCEDURE — A9503 TC99M MEDRONATE: HCPCS

## 2021-05-01 PROCEDURE — 78306 BONE IMAGING WHOLE BODY: CPT

## 2021-05-06 ENCOUNTER — HOSPITAL ENCOUNTER (OUTPATIENT)
Dept: RADIOLOGY | Age: 56
Discharge: HOME | End: 2021-05-06
Payer: COMMERCIAL

## 2021-05-06 DIAGNOSIS — C61 PROSTATE CANCER (HCC): ICD-10-CM

## 2021-05-06 PROCEDURE — 72197 MRI PELVIS W/O & W/DYE: CPT

## 2021-05-06 PROCEDURE — A9585 GADOBUTROL INJECTION: HCPCS | Performed by: UROLOGY

## 2021-05-06 PROCEDURE — 76377 3D RENDER W/INTRP POSTPROCES: CPT

## 2021-05-06 PROCEDURE — G1004 CDSM NDSC: HCPCS

## 2021-05-06 RX ADMIN — GADOBUTROL 9 ML: 604.72 INJECTION INTRAVENOUS at 10:58

## 2021-05-17 ENCOUNTER — TELEPHONE (OUTPATIENT)
Dept: UROLOGY | Facility: CLINIC | Age: 56
End: 2021-05-17

## 2021-05-17 NOTE — TELEPHONE ENCOUNTER
I returned pt 's phone call to inform him that Jordana Griggs left for the day and will be back tomorrow  There was no answer when I called so I did leave a voicemail informing pt that Jordana Griggs will call him back tomorrow and I also confirmed that Jordana Griggs was looking to see if he can go for his labs ASAP and to confirm if he went for his pre op appt that was previously scheduled

## 2021-05-17 NOTE — TELEPHONE ENCOUNTER
Devota Leventhal routed conversation to [facility] 1 hour ago (11:58 AM)      Devota Leventhal 1 hour ago (11:58 AM)        Patient returning your call from earlier today  He can be reached at [phone]           Documentation       February, [facility]  Devota Leventhal

## 2021-05-17 NOTE — TELEPHONE ENCOUNTER
Duplicate encounter  Please only update encounter from 3/19/21 for upcoming surgery with Dr Gleason Fairly

## 2021-05-17 NOTE — TELEPHONE ENCOUNTER
LM for patient advising that PATs for upcoming prostatectomy on 6/7 need to be done asap and that I was also checking to see if he attended his med clearance visit w pcp on 5/15  I did try to call pcp office but office was closed and did not see any documentation in care everywhere  Asked patient to pls cb  I will follow up again, if I do not hear back from him  Gay juarez

## 2021-05-18 NOTE — TELEPHONE ENCOUNTER
Spoke w patient and he did see Kin[facility] Client and that he is going for PATs tomorrow at our [facility]  Once these tests are completed Dr Sanford will clear patient  I will follow-up on Friday w PCP to make sure they can see results  Patient will be dropping off FMLA for his wife  He did ask if she can be off for 2 months and I did inform that no this is not possible we can give her 2 weeks from date of surgery till his post op on 6/17 due to no driving for 2 weeks for him and that for himself it would be 6 weeks  He understood and is aware of $15 fee  Clerical just ann   Thank you

## 2021-05-19 ENCOUNTER — OFFICE VISIT (OUTPATIENT)
Dept: LAB | Facility: HOSPITAL | Age: 56
End: 2021-05-19
Attending: UROLOGY
Payer: COMMERCIAL

## 2021-05-19 ENCOUNTER — TRANSCRIBE ORDERS (OUTPATIENT)
Dept: ADMINISTRATIVE | Facility: HOSPITAL | Age: 56
End: 2021-05-19

## 2021-05-19 ENCOUNTER — APPOINTMENT (OUTPATIENT)
Dept: LAB | Facility: HOSPITAL | Age: 56
End: 2021-05-19
Attending: UROLOGY
Payer: COMMERCIAL

## 2021-05-19 DIAGNOSIS — Z01.818 OTHER SPECIFIED PRE-OPERATIVE EXAMINATION: ICD-10-CM

## 2021-05-19 DIAGNOSIS — C61 PROSTATE CANCER (HCC): ICD-10-CM

## 2021-05-19 DIAGNOSIS — Z01.818 OTHER SPECIFIED PRE-OPERATIVE EXAMINATION: Primary | ICD-10-CM

## 2021-05-19 LAB
ABO GROUP BLD: NORMAL
ALBUMIN SERPL BCP-MCNC: 3.7 G/DL (ref 3.5–5)
ALP SERPL-CCNC: 82 U/L (ref 46–116)
ALT SERPL W P-5'-P-CCNC: 56 U/L (ref 12–78)
ANION GAP SERPL CALCULATED.3IONS-SCNC: 8 MMOL/L (ref 4–13)
APTT PPP: 35 SECONDS (ref 23–37)
AST SERPL W P-5'-P-CCNC: 31 U/L (ref 5–45)
ATRIAL RATE: 72 BPM
BASOPHILS # BLD AUTO: 0.02 THOUSANDS/ΜL (ref 0–0.1)
BASOPHILS NFR BLD AUTO: 0 % (ref 0–1)
BILIRUB SERPL-MCNC: 0.69 MG/DL (ref 0.2–1)
BLD GP AB SCN SERPL QL: NEGATIVE
BUN SERPL-MCNC: 14 MG/DL (ref 5–25)
CALCIUM SERPL-MCNC: 8.9 MG/DL (ref 8.3–10.1)
CHLORIDE SERPL-SCNC: 106 MMOL/L (ref 100–108)
CO2 SERPL-SCNC: 30 MMOL/L (ref 21–32)
CREAT SERPL-MCNC: 1.22 MG/DL (ref 0.6–1.3)
EOSINOPHIL # BLD AUTO: 0.22 THOUSAND/ΜL (ref 0–0.61)
EOSINOPHIL NFR BLD AUTO: 4 % (ref 0–6)
ERYTHROCYTE [DISTWIDTH] IN BLOOD BY AUTOMATED COUNT: 11.9 % (ref 11.6–15.1)
GFR SERPL CREATININE-BSD FRML MDRD: 66 ML/MIN/1.73SQ M
GLUCOSE P FAST SERPL-MCNC: 100 MG/DL (ref 65–99)
HCT VFR BLD AUTO: 38.4 % (ref 36.5–49.3)
HGB BLD-MCNC: 12.7 G/DL (ref 12–17)
IMM GRANULOCYTES # BLD AUTO: 0.01 THOUSAND/UL (ref 0–0.2)
IMM GRANULOCYTES NFR BLD AUTO: 0 % (ref 0–2)
INR PPP: 1.03 (ref 0.84–1.19)
LYMPHOCYTES # BLD AUTO: 1.91 THOUSANDS/ΜL (ref 0.6–4.47)
LYMPHOCYTES NFR BLD AUTO: 39 % (ref 14–44)
MCH RBC QN AUTO: 32.4 PG (ref 26.8–34.3)
MCHC RBC AUTO-ENTMCNC: 33.1 G/DL (ref 31.4–37.4)
MCV RBC AUTO: 98 FL (ref 82–98)
MONOCYTES # BLD AUTO: 0.48 THOUSAND/ΜL (ref 0.17–1.22)
MONOCYTES NFR BLD AUTO: 10 % (ref 4–12)
NEUTROPHILS # BLD AUTO: 2.31 THOUSANDS/ΜL (ref 1.85–7.62)
NEUTS SEG NFR BLD AUTO: 47 % (ref 43–75)
NRBC BLD AUTO-RTO: 0 /100 WBCS
P AXIS: 64 DEGREES
PLATELET # BLD AUTO: 144 THOUSANDS/UL (ref 149–390)
PMV BLD AUTO: 12.4 FL (ref 8.9–12.7)
POTASSIUM SERPL-SCNC: 3.1 MMOL/L (ref 3.5–5.3)
PR INTERVAL: 250 MS
PROT SERPL-MCNC: 8 G/DL (ref 6.4–8.2)
PROTHROMBIN TIME: 13 SECONDS (ref 11.6–14.5)
QRS AXIS: -14 DEGREES
QRSD INTERVAL: 112 MS
QT INTERVAL: 392 MS
QTC INTERVAL: 429 MS
RBC # BLD AUTO: 3.92 MILLION/UL (ref 3.88–5.62)
RH BLD: POSITIVE
SODIUM SERPL-SCNC: 144 MMOL/L (ref 136–145)
SPECIMEN EXPIRATION DATE: NORMAL
T WAVE AXIS: 71 DEGREES
VENTRICULAR RATE: 72 BPM
WBC # BLD AUTO: 4.95 THOUSAND/UL (ref 4.31–10.16)

## 2021-05-19 PROCEDURE — 85610 PROTHROMBIN TIME: CPT

## 2021-05-19 PROCEDURE — 85730 THROMBOPLASTIN TIME PARTIAL: CPT

## 2021-05-19 PROCEDURE — 86850 RBC ANTIBODY SCREEN: CPT

## 2021-05-19 PROCEDURE — 36415 COLL VENOUS BLD VENIPUNCTURE: CPT

## 2021-05-19 PROCEDURE — 86920 COMPATIBILITY TEST SPIN: CPT

## 2021-05-19 PROCEDURE — 80053 COMPREHEN METABOLIC PANEL: CPT

## 2021-05-19 PROCEDURE — 85025 COMPLETE CBC W/AUTO DIFF WBC: CPT

## 2021-05-19 PROCEDURE — 87086 URINE CULTURE/COLONY COUNT: CPT

## 2021-05-19 PROCEDURE — 86900 BLOOD TYPING SEROLOGIC ABO: CPT

## 2021-05-19 PROCEDURE — 86901 BLOOD TYPING SEROLOGIC RH(D): CPT

## 2021-05-19 PROCEDURE — 93005 ELECTROCARDIOGRAM TRACING: CPT

## 2021-05-19 PROCEDURE — 93010 ELECTROCARDIOGRAM REPORT: CPT | Performed by: INTERNAL MEDICINE

## 2021-05-20 LAB — BACTERIA UR CULT: NORMAL

## 2021-05-24 ENCOUNTER — ANESTHESIA EVENT (OUTPATIENT)
Dept: PERIOP | Facility: HOSPITAL | Age: 56
DRG: 707 | End: 2021-05-24
Payer: COMMERCIAL

## 2021-05-24 DIAGNOSIS — Z91.89 RISK FACTORS FOR OBSTRUCTIVE SLEEP APNEA: Primary | ICD-10-CM

## 2021-05-24 RX ORDER — DIPHENOXYLATE HYDROCHLORIDE AND ATROPINE SULFATE 2.5; .025 MG/1; MG/1
1 TABLET ORAL DAILY
COMMUNITY

## 2021-05-24 NOTE — PRE-PROCEDURE INSTRUCTIONS
Pre-Surgery Instructions:   Medication Instructions    amLODIPine (NORVASC) 5 mg tablet Instructed to take per normal schedule including DOS with sips water    fluticasone (FLONASE) 50 mcg/act nasal spray Instructed to take as needed including DOS    hydrochlorothiazide (HYDRODIURIL) 25 mg tablet Instructed to take per normal schedule except DOS    losartan (COZAAR) 100 MG tablet Instructed to take per normal schedule except DOS    multivitamin (THERAGRAN) TABS Instructed patient per Anesthesia Guidelines  Pre op instructions per My Surgical Experience booklet,medications per anesthesia guidelines and showering instructions per [facility] protocol reviewed-Patient has CHG  Pt  Verbalized understanding of current visitor restrictions  Reviewed Carb Drink Instructions per Surgeon/Anes  Guidelines   Instructed to avoid all ASA and OTC Vit/Supp 1 week prior to surgery and to avoid NSAIDs 3 days prior to surgery  Tylenol ok to take prn  Bowel prep reviewed per Dr Ellie Zelaya reviewed IS,Kegel exercises,Calixto Catheter care,Post op care( including what supplies are needed) and DVT  Pt  Verbalized an understanding of all instructions reviewed and offers no concerns at this time

## 2021-05-24 NOTE — TELEPHONE ENCOUNTER
Paperwork received  Pt called and ready for p/u in [address] office  Pt paid $15  Son Brittany Kelly will p/u, window 4

## 2021-06-01 NOTE — TELEPHONE ENCOUNTER
Still pending clearance from Dr Sanford office  I spoke w him personally and he stated he is waiting to hear from Dewight Pack on clearing patient due to EKG  Once conf w Kush Nogueira he will sign off

## 2021-06-01 NOTE — TELEPHONE ENCOUNTER
Jensen at [facility] office called requesting to speak with Surgery Coordinator states form received is cardiac clearance,if pt needs medical clearance that what the form must say

## 2021-06-01 NOTE — TELEPHONE ENCOUNTER
Explained to [facility] this was a typo he does not need Cardiac clearance refaxed form stating medical clearance

## 2021-06-02 NOTE — TELEPHONE ENCOUNTER
Shanita ackerman that fax was sent yesterday to [phone]  We are pending scanning into media for clearance form

## 2021-06-04 NOTE — TELEPHONE ENCOUNTER
LM conf that forms were faxed on May 24  Asked to pete love any questions forms are scanned under media

## 2021-06-04 NOTE — TELEPHONE ENCOUNTER
[facility] pt's wife Rahul employer called in to verify if our office completed her FMLA paperwork   [employer] can be reached at [phone]

## 2021-06-07 ENCOUNTER — ANESTHESIA (OUTPATIENT)
Dept: PERIOP | Facility: HOSPITAL | Age: 56
DRG: 707 | End: 2021-06-07
Payer: COMMERCIAL

## 2021-06-07 ENCOUNTER — HOSPITAL ENCOUNTER (INPATIENT)
Facility: HOSPITAL | Age: 56
LOS: 1 days | Discharge: HOME | DRG: 707 | End: 2021-06-09
Attending: UROLOGY | Admitting: UROLOGY
Payer: COMMERCIAL

## 2021-06-07 ENCOUNTER — TELEPHONE (OUTPATIENT)
Dept: UROLOGY | Facility: CLINIC | Age: 56
End: 2021-06-07

## 2021-06-07 DIAGNOSIS — C61 PROSTATE CANCER (HCC): Primary | ICD-10-CM

## 2021-06-07 LAB
ABO GROUP BLD: NORMAL
RH BLD: POSITIVE

## 2021-06-07 PROCEDURE — A4338 INDWELLING CATHETER LATEX: HCPCS | Performed by: UROLOGY

## 2021-06-07 PROCEDURE — 38571 LAPAROSCOPY LYMPHADENECTOMY: CPT | Performed by: UROLOGY

## 2021-06-07 PROCEDURE — 0VT04ZZ RESECTION OF PROSTATE, PERCUTANEOUS ENDOSCOPIC APPROACH: ICD-10-PCS | Performed by: UROLOGY

## 2021-06-07 PROCEDURE — 38571 LAPAROSCOPY LYMPHADENECTOMY: CPT | Performed by: PHYSICIAN ASSISTANT

## 2021-06-07 PROCEDURE — 8E0W4CZ ROBOTIC ASSISTED PROCEDURE OF TRUNK REGION, PERCUTANEOUS ENDOSCOPIC APPROACH: ICD-10-PCS | Performed by: UROLOGY

## 2021-06-07 PROCEDURE — 07BC4ZX EXCISION OF PELVIS LYMPHATIC, PERCUTANEOUS ENDOSCOPIC APPROACH, DIAGNOSTIC: ICD-10-PCS | Performed by: UROLOGY

## 2021-06-07 PROCEDURE — 88309 TISSUE EXAM BY PATHOLOGIST: CPT | Performed by: PATHOLOGY

## 2021-06-07 PROCEDURE — NC001 PR NO CHARGE: Performed by: UROLOGY

## 2021-06-07 PROCEDURE — 88307 TISSUE EXAM BY PATHOLOGIST: CPT | Performed by: PATHOLOGY

## 2021-06-07 PROCEDURE — 55866 LAPS SURG PRST8ECT RPBIC RAD: CPT | Performed by: UROLOGY

## 2021-06-07 PROCEDURE — 55866 LAPS SURG PRST8ECT RPBIC RAD: CPT | Performed by: PHYSICIAN ASSISTANT

## 2021-06-07 RX ORDER — CEFAZOLIN SODIUM 2 G/50ML
2000 SOLUTION INTRAVENOUS ONCE
Status: COMPLETED | OUTPATIENT
Start: 2021-06-07 | End: 2021-06-07

## 2021-06-07 RX ORDER — FENTANYL CITRATE 50 UG/ML
INJECTION, SOLUTION INTRAMUSCULAR; INTRAVENOUS AS NEEDED
Status: DISCONTINUED | OUTPATIENT
Start: 2021-06-07 | End: 2021-06-07

## 2021-06-07 RX ORDER — BACITRACIN, NEOMYCIN, POLYMYXIN B 400; 3.5; 5 [USP'U]/G; MG/G; [USP'U]/G
1 OINTMENT TOPICAL 2 TIMES DAILY
Status: DISCONTINUED | OUTPATIENT
Start: 2021-06-07 | End: 2021-06-09 | Stop reason: HOSPADM

## 2021-06-07 RX ORDER — HYDROCHLOROTHIAZIDE 25 MG/1
25 TABLET ORAL
Status: DISCONTINUED | OUTPATIENT
Start: 2021-06-08 | End: 2021-06-09 | Stop reason: HOSPADM

## 2021-06-07 RX ORDER — OXYCODONE HYDROCHLORIDE 10 MG/1
10 TABLET ORAL EVERY 4 HOURS PRN
Status: DISCONTINUED | OUTPATIENT
Start: 2021-06-07 | End: 2021-06-09 | Stop reason: HOSPADM

## 2021-06-07 RX ORDER — CEFAZOLIN SODIUM 2 G/50ML
2000 SOLUTION INTRAVENOUS EVERY 8 HOURS
Status: COMPLETED | OUTPATIENT
Start: 2021-06-07 | End: 2021-06-08

## 2021-06-07 RX ORDER — OXYBUTYNIN CHLORIDE 5 MG/1
5 TABLET, EXTENDED RELEASE ORAL DAILY
Status: DISCONTINUED | OUTPATIENT
Start: 2021-06-07 | End: 2021-06-08

## 2021-06-07 RX ORDER — PROPOFOL 10 MG/ML
INJECTION, EMULSION INTRAVENOUS AS NEEDED
Status: DISCONTINUED | OUTPATIENT
Start: 2021-06-07 | End: 2021-06-07

## 2021-06-07 RX ORDER — DIPHENHYDRAMINE HCL 25 MG
25 TABLET ORAL EVERY 6 HOURS PRN
Status: DISCONTINUED | OUTPATIENT
Start: 2021-06-07 | End: 2021-06-09 | Stop reason: HOSPADM

## 2021-06-07 RX ORDER — ACETAMINOPHEN 325 MG/1
975 TABLET ORAL ONCE
Status: COMPLETED | OUTPATIENT
Start: 2021-06-07 | End: 2021-06-07

## 2021-06-07 RX ORDER — SODIUM CHLORIDE, SODIUM LACTATE, POTASSIUM CHLORIDE, CALCIUM CHLORIDE 600; 310; 30; 20 MG/100ML; MG/100ML; MG/100ML; MG/100ML
INJECTION, SOLUTION INTRAVENOUS CONTINUOUS PRN
Status: DISCONTINUED | OUTPATIENT
Start: 2021-06-07 | End: 2021-06-07

## 2021-06-07 RX ORDER — FENTANYL CITRATE/PF 50 MCG/ML
50 SYRINGE (ML) INJECTION
Status: DISCONTINUED | OUTPATIENT
Start: 2021-06-07 | End: 2021-06-07 | Stop reason: HOSPADM

## 2021-06-07 RX ORDER — NEOSTIGMINE METHYLSULFATE 1 MG/ML
INJECTION INTRAVENOUS AS NEEDED
Status: DISCONTINUED | OUTPATIENT
Start: 2021-06-07 | End: 2021-06-07

## 2021-06-07 RX ORDER — ACETAMINOPHEN 325 MG/1
650 TABLET ORAL EVERY 6 HOURS SCHEDULED
Status: DISCONTINUED | OUTPATIENT
Start: 2021-06-07 | End: 2021-06-09 | Stop reason: HOSPADM

## 2021-06-07 RX ORDER — HEPARIN SODIUM 5000 [USP'U]/ML
5000 INJECTION, SOLUTION INTRAVENOUS; SUBCUTANEOUS EVERY 8 HOURS SCHEDULED
Status: DISCONTINUED | OUTPATIENT
Start: 2021-06-08 | End: 2021-06-09 | Stop reason: HOSPADM

## 2021-06-07 RX ORDER — HYDROMORPHONE HCL/PF 1 MG/ML
0.5 SYRINGE (ML) INJECTION
Status: DISCONTINUED | OUTPATIENT
Start: 2021-06-07 | End: 2021-06-07 | Stop reason: HOSPADM

## 2021-06-07 RX ORDER — ONDANSETRON 2 MG/ML
INJECTION INTRAMUSCULAR; INTRAVENOUS AS NEEDED
Status: DISCONTINUED | OUTPATIENT
Start: 2021-06-07 | End: 2021-06-07

## 2021-06-07 RX ORDER — EPHEDRINE SULFATE 50 MG/ML
INJECTION INTRAVENOUS AS NEEDED
Status: DISCONTINUED | OUTPATIENT
Start: 2021-06-07 | End: 2021-06-07

## 2021-06-07 RX ORDER — SENNOSIDES 8.6 MG
1 TABLET ORAL DAILY
Status: DISCONTINUED | OUTPATIENT
Start: 2021-06-07 | End: 2021-06-09 | Stop reason: HOSPADM

## 2021-06-07 RX ORDER — ONDANSETRON 2 MG/ML
4 INJECTION INTRAMUSCULAR; INTRAVENOUS EVERY 6 HOURS PRN
Status: DISCONTINUED | OUTPATIENT
Start: 2021-06-07 | End: 2021-06-09 | Stop reason: HOSPADM

## 2021-06-07 RX ORDER — OXYCODONE HYDROCHLORIDE 5 MG/1
TABLET ORAL
Status: COMPLETED
Start: 2021-06-07 | End: 2021-06-07

## 2021-06-07 RX ORDER — AMLODIPINE BESYLATE 5 MG/1
5 TABLET ORAL
Status: DISCONTINUED | OUTPATIENT
Start: 2021-06-08 | End: 2021-06-09 | Stop reason: HOSPADM

## 2021-06-07 RX ORDER — BUPIVACAINE HYDROCHLORIDE 2.5 MG/ML
INJECTION, SOLUTION EPIDURAL; INFILTRATION; INTRACAUDAL AS NEEDED
Status: DISCONTINUED | OUTPATIENT
Start: 2021-06-07 | End: 2021-06-07 | Stop reason: HOSPADM

## 2021-06-07 RX ORDER — MAGNESIUM HYDROXIDE 1200 MG/15ML
LIQUID ORAL AS NEEDED
Status: DISCONTINUED | OUTPATIENT
Start: 2021-06-07 | End: 2021-06-07 | Stop reason: HOSPADM

## 2021-06-07 RX ORDER — PHENAZOPYRIDINE HYDROCHLORIDE 100 MG/1
200 TABLET, FILM COATED ORAL
Status: DISCONTINUED | OUTPATIENT
Start: 2021-06-07 | End: 2021-06-08

## 2021-06-07 RX ORDER — MAGNESIUM HYDROXIDE/ALUMINUM HYDROXICE/SIMETHICONE 120; 1200; 1200 MG/30ML; MG/30ML; MG/30ML
30 SUSPENSION ORAL EVERY 6 HOURS PRN
Status: DISCONTINUED | OUTPATIENT
Start: 2021-06-07 | End: 2021-06-09 | Stop reason: HOSPADM

## 2021-06-07 RX ORDER — ROCURONIUM BROMIDE 10 MG/ML
INJECTION, SOLUTION INTRAVENOUS AS NEEDED
Status: DISCONTINUED | OUTPATIENT
Start: 2021-06-07 | End: 2021-06-07

## 2021-06-07 RX ORDER — OXYCODONE HYDROCHLORIDE 5 MG/1
5 TABLET ORAL EVERY 4 HOURS PRN
Qty: 10 TABLET | Refills: 0 | Status: ON HOLD | OUTPATIENT
Start: 2021-06-07 | End: 2021-06-14

## 2021-06-07 RX ORDER — SODIUM CHLORIDE 9 MG/ML
INJECTION, SOLUTION INTRAVENOUS CONTINUOUS PRN
Status: DISCONTINUED | OUTPATIENT
Start: 2021-06-07 | End: 2021-06-07

## 2021-06-07 RX ORDER — SODIUM CHLORIDE, SODIUM LACTATE, POTASSIUM CHLORIDE, CALCIUM CHLORIDE 600; 310; 30; 20 MG/100ML; MG/100ML; MG/100ML; MG/100ML
125 INJECTION, SOLUTION INTRAVENOUS CONTINUOUS
Status: DISCONTINUED | OUTPATIENT
Start: 2021-06-07 | End: 2021-06-09 | Stop reason: HOSPADM

## 2021-06-07 RX ORDER — HYDROMORPHONE HCL/PF 1 MG/ML
0.5 SYRINGE (ML) INJECTION EVERY 2 HOUR PRN
Status: DISPENSED | OUTPATIENT
Start: 2021-06-07 | End: 2021-06-09

## 2021-06-07 RX ORDER — DOCUSATE SODIUM 100 MG/1
100 CAPSULE, LIQUID FILLED ORAL 3 TIMES DAILY
Qty: 42 CAPSULE | Refills: 0 | Status: SHIPPED | OUTPATIENT
Start: 2021-06-07 | End: 2022-01-03

## 2021-06-07 RX ORDER — OXYCODONE HYDROCHLORIDE 5 MG/1
5 TABLET ORAL EVERY 4 HOURS PRN
Status: DISCONTINUED | OUTPATIENT
Start: 2021-06-07 | End: 2021-06-09 | Stop reason: HOSPADM

## 2021-06-07 RX ORDER — DOCUSATE SODIUM 100 MG/1
100 CAPSULE, LIQUID FILLED ORAL 2 TIMES DAILY
Status: DISCONTINUED | OUTPATIENT
Start: 2021-06-07 | End: 2021-06-09 | Stop reason: HOSPADM

## 2021-06-07 RX ORDER — FLUTICASONE PROPIONATE 50 MCG
1 SPRAY, SUSPENSION (ML) NASAL DAILY
Status: DISCONTINUED | OUTPATIENT
Start: 2021-06-07 | End: 2021-06-09 | Stop reason: HOSPADM

## 2021-06-07 RX ORDER — GLYCOPYRROLATE 0.2 MG/ML
INJECTION INTRAMUSCULAR; INTRAVENOUS AS NEEDED
Status: DISCONTINUED | OUTPATIENT
Start: 2021-06-07 | End: 2021-06-07

## 2021-06-07 RX ORDER — ONDANSETRON 2 MG/ML
4 INJECTION INTRAMUSCULAR; INTRAVENOUS ONCE AS NEEDED
Status: DISCONTINUED | OUTPATIENT
Start: 2021-06-07 | End: 2021-06-07 | Stop reason: HOSPADM

## 2021-06-07 RX ORDER — HYDROMORPHONE HCL/PF 1 MG/ML
SYRINGE (ML) INJECTION AS NEEDED
Status: DISCONTINUED | OUTPATIENT
Start: 2021-06-07 | End: 2021-06-07

## 2021-06-07 RX ORDER — MIDAZOLAM HYDROCHLORIDE 2 MG/2ML
INJECTION, SOLUTION INTRAMUSCULAR; INTRAVENOUS AS NEEDED
Status: DISCONTINUED | OUTPATIENT
Start: 2021-06-07 | End: 2021-06-07

## 2021-06-07 RX ORDER — SODIUM CHLORIDE, SODIUM LACTATE, POTASSIUM CHLORIDE, CALCIUM CHLORIDE 600; 310; 30; 20 MG/100ML; MG/100ML; MG/100ML; MG/100ML
50 INJECTION, SOLUTION INTRAVENOUS CONTINUOUS
Status: DISCONTINUED | OUTPATIENT
Start: 2021-06-07 | End: 2021-06-07 | Stop reason: SDUPTHER

## 2021-06-07 RX ORDER — GABAPENTIN 300 MG/1
300 CAPSULE ORAL ONCE
Status: COMPLETED | OUTPATIENT
Start: 2021-06-07 | End: 2021-06-07

## 2021-06-07 RX ORDER — LOSARTAN POTASSIUM 50 MG/1
100 TABLET ORAL
Status: DISCONTINUED | OUTPATIENT
Start: 2021-06-08 | End: 2021-06-09 | Stop reason: HOSPADM

## 2021-06-07 RX ORDER — DEXAMETHASONE SODIUM PHOSPHATE 10 MG/ML
INJECTION, SOLUTION INTRAMUSCULAR; INTRAVENOUS AS NEEDED
Status: DISCONTINUED | OUTPATIENT
Start: 2021-06-07 | End: 2021-06-07

## 2021-06-07 RX ORDER — POLYETHYLENE GLYCOL 3350 17 G/17G
17 POWDER, FOR SOLUTION ORAL DAILY
Qty: 119 G | Refills: 0 | Status: SHIPPED | OUTPATIENT
Start: 2021-06-07 | End: 2021-06-14

## 2021-06-07 RX ORDER — LIDOCAINE HYDROCHLORIDE 10 MG/ML
INJECTION, SOLUTION EPIDURAL; INFILTRATION; INTRACAUDAL; PERINEURAL AS NEEDED
Status: DISCONTINUED | OUTPATIENT
Start: 2021-06-07 | End: 2021-06-07

## 2021-06-07 RX ADMIN — CEFAZOLIN SODIUM 2000 MG: 2 SOLUTION INTRAVENOUS at 23:39

## 2021-06-07 RX ADMIN — ROCURONIUM BROMIDE 20 MG: 10 INJECTION, SOLUTION INTRAVENOUS at 08:11

## 2021-06-07 RX ADMIN — HYDROMORPHONE HYDROCHLORIDE 0.5 MG: 1 INJECTION, SOLUTION INTRAMUSCULAR; INTRAVENOUS; SUBCUTANEOUS at 11:30

## 2021-06-07 RX ADMIN — ONDANSETRON 4 MG: 2 INJECTION INTRAMUSCULAR; INTRAVENOUS at 10:08

## 2021-06-07 RX ADMIN — CEFAZOLIN SODIUM 2000 MG: 2 SOLUTION INTRAVENOUS at 07:58

## 2021-06-07 RX ADMIN — DEXAMETHASONE SODIUM PHOSPHATE 4 MG: 10 INJECTION, SOLUTION INTRAMUSCULAR; INTRAVENOUS at 07:56

## 2021-06-07 RX ADMIN — SENNOSIDES 8.6 MG: 8.6 TABLET ORAL at 18:01

## 2021-06-07 RX ADMIN — ACETAMINOPHEN 975 MG: 325 TABLET, FILM COATED ORAL at 07:14

## 2021-06-07 RX ADMIN — DOCUSATE SODIUM 100 MG: 100 CAPSULE, LIQUID FILLED ORAL at 18:01

## 2021-06-07 RX ADMIN — SODIUM CHLORIDE: 9 INJECTION, SOLUTION INTRAVENOUS at 07:58

## 2021-06-07 RX ADMIN — OXYCODONE HYDROCHLORIDE 5 MG: 5 TABLET ORAL at 13:21

## 2021-06-07 RX ADMIN — PHENYLEPHRINE HYDROCHLORIDE 200 MCG: 10 INJECTION INTRAVENOUS at 08:47

## 2021-06-07 RX ADMIN — FENTANYL CITRATE 100 MCG: 50 INJECTION, SOLUTION INTRAMUSCULAR; INTRAVENOUS at 07:50

## 2021-06-07 RX ADMIN — BACITRACIN, NEOMYCIN, POLYMYXIN B 1 SMALL APPLICATION: 400; 3.5; 5 OINTMENT TOPICAL at 19:40

## 2021-06-07 RX ADMIN — ACETAMINOPHEN 650 MG: 325 TABLET, FILM COATED ORAL at 23:38

## 2021-06-07 RX ADMIN — MIDAZOLAM HYDROCHLORIDE 2 MG: 1 INJECTION, SOLUTION INTRAMUSCULAR; INTRAVENOUS at 07:40

## 2021-06-07 RX ADMIN — PHENYLEPHRINE HYDROCHLORIDE 100 MCG: 10 INJECTION INTRAVENOUS at 08:43

## 2021-06-07 RX ADMIN — ROCURONIUM BROMIDE 10 MG: 10 INJECTION, SOLUTION INTRAVENOUS at 10:45

## 2021-06-07 RX ADMIN — SODIUM CHLORIDE, SODIUM LACTATE, POTASSIUM CHLORIDE, AND CALCIUM CHLORIDE 125 ML/HR: .6; .31; .03; .02 INJECTION, SOLUTION INTRAVENOUS at 14:00

## 2021-06-07 RX ADMIN — GLYCOPYRROLATE 0.4 MG: 0.2 INJECTION, SOLUTION INTRAMUSCULAR; INTRAVENOUS at 11:04

## 2021-06-07 RX ADMIN — OXYCODONE HYDROCHLORIDE 10 MG: 10 TABLET ORAL at 21:33

## 2021-06-07 RX ADMIN — PROPOFOL 200 MG: 10 INJECTION, EMULSION INTRAVENOUS at 07:51

## 2021-06-07 RX ADMIN — ROCURONIUM BROMIDE 20 MG: 10 INJECTION, SOLUTION INTRAVENOUS at 09:28

## 2021-06-07 RX ADMIN — EPHEDRINE SULFATE 10 MG: 50 INJECTION, SOLUTION INTRAVENOUS at 08:45

## 2021-06-07 RX ADMIN — GABAPENTIN 300 MG: 300 CAPSULE ORAL at 07:14

## 2021-06-07 RX ADMIN — SODIUM CHLORIDE, SODIUM LACTATE, POTASSIUM CHLORIDE, AND CALCIUM CHLORIDE 125 ML/HR: .6; .31; .03; .02 INJECTION, SOLUTION INTRAVENOUS at 19:52

## 2021-06-07 RX ADMIN — NEOSTIGMINE METHYLSULFATE 3 MG: 1 INJECTION INTRAVENOUS at 11:04

## 2021-06-07 RX ADMIN — ROCURONIUM BROMIDE 50 MG: 10 INJECTION, SOLUTION INTRAVENOUS at 07:52

## 2021-06-07 RX ADMIN — CEFAZOLIN SODIUM 2000 MG: 2 SOLUTION INTRAVENOUS at 16:15

## 2021-06-07 RX ADMIN — ACETAMINOPHEN 650 MG: 325 TABLET, FILM COATED ORAL at 17:58

## 2021-06-07 RX ADMIN — PHENAZOPYRIDINE 200 MG: 100 TABLET ORAL at 18:02

## 2021-06-07 RX ADMIN — SODIUM CHLORIDE, SODIUM LACTATE, POTASSIUM CHLORIDE, AND CALCIUM CHLORIDE: .6; .31; .03; .02 INJECTION, SOLUTION INTRAVENOUS at 07:30

## 2021-06-07 RX ADMIN — OXYBUTYNIN CHLORIDE 5 MG: 5 TABLET, EXTENDED RELEASE ORAL at 18:01

## 2021-06-07 RX ADMIN — HYDROMORPHONE HYDROCHLORIDE 0.5 MG: 1 INJECTION, SOLUTION INTRAMUSCULAR; INTRAVENOUS; SUBCUTANEOUS at 08:31

## 2021-06-07 RX ADMIN — LIDOCAINE HYDROCHLORIDE 50 MG: 10 INJECTION, SOLUTION EPIDURAL; INFILTRATION; INTRACAUDAL at 07:49

## 2021-06-07 RX ADMIN — PHENYLEPHRINE HYDROCHLORIDE 30 MCG/MIN: 10 INJECTION INTRAVENOUS at 08:49

## 2021-06-07 NOTE — ANESTHESIA PREPROCEDURE EVALUATION
Procedure:  ROBOTIC LAPAROSCOPIC RADICAL PROSTATECTOMY AND PELVIC LYMPH NODE DISSECTION (N/A Abdomen)    Relevant Problems   /RENAL   (+) Prostate cancer ([facility] [address] )        Physical Exam    Airway    Mallampati score: II  TM Distance: >3 FB       Dental       Cardiovascular      Pulmonary  Breath sounds clear to auscultation,     Other Findings        Anesthesia Plan  ASA Score- 3     Anesthesia Type- general with ASA Monitors  Additional Monitors:   Airway Plan: ETT  Plan Factors-Exercise tolerance (METS): >4 METS  Chart reviewed  EKG reviewed  Existing labs reviewed  Patient summary reviewed  Patient is not a current smoker  Induction- intravenous  Postoperative Plan-     Informed Consent- Anesthetic plan and risks discussed with patient and spouse  I personally reviewed this patient with the CRNA  Discussed and agreed on the Anesthesia Plan with the CRNA  Rita Puri

## 2021-06-07 NOTE — OP NOTE
OPERATIVE REPORT  PATIENT NAME: Robin Schuler February    :  1965  MRN: [id number]  Pt Location: AN OR ROOM 04    SURGERY DATE: 2021    Surgeon(s) and Role:     * Rosalva Lerma MD - Primary     * Chato Mtz PA-C - Assisting     * Ned Aschoff, PA-C - Assisting    Please note that the physician associates were necessary during today's case for changing robotic instruments and exposing the appropriate anatomy    Preop Diagnosis:  Prostate cancer ([facility] [address] ) Ned Glaser    Post-Op Diagnosis Codes:     * Prostate cancer ([facility] [address] ) Ned Glaser    Procedure(s) (LRB):  ROBOTIC LAPAROSCOPIC RADICAL PROSTATECTOMY AND PELVIC LYMPH NODE DISSECTION (N/A)    Specimen(s):  ID Type Source Tests Collected by Time Destination   1 : PERIPROSTATIC FAT Tissue Soft Tissue, Other TISSUE EXAM Rosalva Lerma MD 2021 7515    2 : PROSTATE , SEMINAL VESICLES, AND BILATERAL PELVIC LYMPH NODES Tissue Prostate TISSUE EXAM Rosalva Lerma MD 2021 1022        Estimated Blood Loss:   Minimal    Drains:  Closed/Suction Drain LLQ Bulb 19 Fr  (Active)   Number of days: 0       Urethral Catheter 20 Fr  (Active)   Number of days: 0       [REMOVED] Urethral Catheter Coude 18 Fr  (Removed)   Number of days: 0       Anesthesia Type:   General    Operative Indications:  Prostate cancer ([facility] [address] ) [C61]      Operative Findings:  No signs of local or regional spread around the prostate itself, however the lymph nodes are enlarged, and reactive, unclear if this is due to an infectious process versus neoplastic positive lymph nodes  A wide excision was performed without nerve sparing given the character of the tissue around the prostate and the need for wide excision given high risk prostate cancer    Watertight anastomosis at the end of today's case, blood loss roughly 100 milliliters    Complications:   None    Procedure and Technique:  The patient was advised of all of his treatment options including radiation and hormone therapy as well as surgical management  Given his young age and aggressive disease he opted for surgical management after the informed decision making process  He was optimized for surgery  He did sign informed consent to proceed with surgery today  He was seen by the perioperative nursing and anesthesia team and then brought the operating room after being deemed cleared for the operating room  He was induced under general anesthesia by way of an endotracheal tube in the supine position, an orogastric tube was placed as was a temperature probe, appropriate intravenous access was then obtained, sequential compression devices were placed  DVT prophylaxis was given as per the guidelines  He was administered Ancef for preoperative antibiosis as per the guidelines  He was placed in the low lithotomy position and then secured to the bed with surgical towels and tape taking care to pad all pressure points  He was prepared and draped in usual fashion of which a time-out confirmed the proper patient, position, and procedure  Following this we obtained pneumoperitoneum with the Veress needle device, to 15 millimeters of mercury  Ports were then placed as is customary for a robotic radical prostatectomy with a total of 4 robotic ports and 2 assistant ports, 5 millimeter and 12 millimeter  The robot was then docked with a monopolar scissor, a Jaclyn bipolar dissect or, and a ProGrasp forceps  We began by taking some small film like adhesions of the sigmoid colon to the pelvic sidewall from a lateral to medial fashion  Following this a posterior incision in the peritoneum overlying the seminal vesicles and vas deferens was performed, these structures were then dissected free of their surrounding tissue and blood supply to fully mobilize the bilateral Vasa deferentia and bilateral seminal vesicles    The plane of Denonvilliers fascia was then obtained sharply and bluntly with use of a medial to lateral dissection with the wrist of the right hand and left hand  Following this the bladder was dropped, there was some inflammation in the space of Retzius which made this slightly more difficult than usual although this was ultimately successful  The endopelvic fascia was entered purposefully on the right and the left, a small venous tributary was entered on the left side during this dissection and 20 milliliters of blood loss was encountered, this was then sutured with a Vicryl suture  The pubic prostatic ligaments were then taken, the dorsal venous complex was dissected and then tied off with a figure-of-eight Vicryl suture  The bladder neck was then delineated and dissected in a anterior to posterior fashion, once we reached the posterior bladder neck it was evident that the patient had a small median lobe, the dissection was adjusted around the median lobe to ensure complete removal of all prostate tissue today  Following this we made a posterior bladder neck incision and delivered the previously dissected vas deferens and seminal vesicles bilaterally  The prostate pedicles were then skeletonized and controlled with a robotic vessel sealer given the need for wide excision given the patient's high risk disease  Wide excision was performed on the right and the left with no evidence of local or regional spread outside of the capsule of the prostate and surrounding fascia  The dorsal venous complex was then dissected from the anterior surface of the prostate, having previously been tied off  This was done with relatively little blood loss, the previously placed Vicryl suture was seen to slip off, during this maneuver roughly 30 milliliters of blood loss was encountered and they remaining venous sinuses were then tied off with a be lock suture and the area was hemostatic  The prostate was left in the pelvis and a bilateral pelvic lymph node dissection was then performed    All fibrofatty tissue over the external iliac vessels and over the  nerve and vessels was removed, no damage to the nervous or vascular structures was noted, these nodes are seen to be reactive and chunky, more on the left relative to the right, this is concerning for metastatic disease  Following this the lymph nodes and the prostate and seminal vesicles were placed in a Endo-Catch bag, the pelvis was widely irrigated, a vesicourethral anastomosis was then performed in the typical fashion with a running V lock suture counter clockwise and clockwise from posterior to anterior  Following this a final Calixto catheter was placed with a water fast closure  A drain was placed after placement of a thrombin matrix within the pelvis  There were no complications  A postoperative de brief procedure was then performed in which we confirmed the steps of today's case, the specimen, and also discussed any areas for improvement going forward  There were no complications today during today's operation  The specimen was then delivered by extending the supraumbilical incision, all incisions were infiltrated with local anesthesia after closing the fascia with a PDS suture in figure-of-eight fashion  The skin was then closed with 4 Monocryl suture  The patient was awakened from anesthesia and taken to the recovery room with a planned overnight stay and a planned dwell time of his Calixto catheter of 1 week or so  I was present for the entire procedure and A qualified resident physician was not available    Patient Disposition:  PACU      Plan:  The patient will be admitted to hospital overnight, he does not require postoperative labs, we will check labs for him in the morning, pending a smooth postoperative course and meeting criteria for discharge he will be discharged home with his Calixto catheter tomorrow  Depending on drain output he Adore Martinezgladys may not be discharged home with his drain      SIGNATURE: Lisseth Miner MD  DATE: June 7, 2021  TIME: 11:25 AM R wrist pain

## 2021-06-07 NOTE — H&P
H&P Exam - Urology   [facility] February 55 y o  male MRN: [id number]  Unit/Bed#: OR POOL Encounter: [id number]    Assessment/Plan     Assessment:  63year old man with high risk prostate cancer here for ralp and potential plnd pending operative findings, ready for surgery today      Plan:  Proceed to robot assisted laparoscopic radical prostatectomy and all indicated procedures    History of Present Illness   HPI:  [facility] February is a 63 y o  male who presents with intermediate risk prostate cancer, here for removal of prostate, no change since last visit in [facility], no new issues  The following portions of the patient's history were reviewed and updated as appropriate: allergies, current medications, past family history, past medical history, past social history, past surgical history and problem list        Review of Systems   Constitutional: Negative  HENT: Negative  Eyes: Negative  Respiratory: Negative  Cardiovascular: Negative  Gastrointestinal: Negative  Endocrine: Negative  Genitourinary: Negative  Musculoskeletal: Negative  Skin: Negative  Allergic/Immunologic: Negative  Neurological: Negative  Hematological: Negative  Psychiatric/Behavioral: Negative          Historical Information   Past Medical History:   Diagnosis Date    Hypertension      Past Surgical History:   Procedure Laterality Date    COLONOSCOPY       Social History   Social History     Substance and Sexual Activity   Alcohol Use Yes    Alcohol/week: 7 0 standard drinks    Types: 7 Standard drinks or equivalent per week    Frequency: 4 or more times a week    Drinks per session: 1 or 2    Binge frequency: Never     Social History     Substance and Sexual Activity   Drug Use Never     Social History     Tobacco Use   Smoking Status Former Smoker    Quit date:     Years since quittin 4   Smokeless Tobacco Never Used     E-Cigarette/Vaping    E-Cigarette Use Never User E-Cigarette/Vaping Substances     Family History:   Family History   Problem Relation Age of Onset    Hypertension Father        Meds/Allergies   PTA meds:   Prior to Admission Medications   Prescriptions Last Dose Informant Patient Reported? Taking? amLODIPine (NORVASC) 5 mg tablet 6/7/2021 at Unknown time Self Yes Yes   Sig: Take 5 mg by mouth daily in the early morning    fluticasone (FLONASE) 50 mcg/act nasal spray Past Week at Unknown time Self Yes Yes   Sig: fluticasone propionate 50 mcg/actuation nasal spray,suspension   one puff each nostril daily prn    hydrochlorothiazide (HYDRODIURIL) 25 mg tablet 6/6/2021 at Unknown time Self Yes Yes   Sig: Take 25 mg by mouth daily in the early morning    losartan (COZAAR) 100 MG tablet 6/6/2021 at Unknown time Self Yes Yes   Sig: Take 100 mg by mouth daily in the early morning    multivitamin (THERAGRAN) TABS Past Week at Unknown time  Yes Yes   Sig: Take 1 tablet by mouth daily      Facility-Administered Medications: None     No Known Allergies    Objective   Vitals: Blood pressure 145/77, pulse 67, temperature 97 6 °F (36 4 °C), temperature source Temporal, resp  rate 18, height 5' 9" (1 753 m), weight 96 2 kg (212 lb), SpO2 99 %  No intake/output data recorded  Invasive Devices     Peripheral Intravenous Line            Peripheral IV 06/07/21 Left Hand less than 1 day                Physical Exam  Vitals signs reviewed  Constitutional:       General: He is not in acute distress  Appearance: Normal appearance  He is not ill-appearing, toxic-appearing or diaphoretic  HENT:      Head: Normocephalic and atraumatic  Eyes:      General: No scleral icterus  Right eye: No discharge  Left eye: No discharge  Cardiovascular:      Pulses: Normal pulses  Pulmonary:      Effort: Pulmonary effort is normal    Abdominal:      General: There is no distension  Palpations: There is no mass  Tenderness:  There is no abdominal tenderness  Hernia: No hernia is present  Genitourinary:     Comments: Deferred for the OR  Musculoskeletal:         General: No swelling or tenderness  Skin:     Coloration: Skin is not jaundiced or pale  Neurological:      General: No focal deficit present  Mental Status: He is alert and oriented to person, place, and time  Cranial Nerves: No cranial nerve deficit  Psychiatric:         Mood and Affect: Mood normal          Behavior: Behavior normal          Thought Content: Thought content normal          Judgment: Judgment normal          Lab Results: I have personally reviewed pertinent reports  Imaging: I have personally reviewed pertinent reports  EKG, Pathology, and Other Studies: I have personally reviewed pertinent reports  VTE Prophylaxis: Sequential compression device Juventino Elizabeth     Code Status: No Order  Advance Directive and Living Will:      Power of :    POLST:      Counseling / Coordination of Care  Total floor / unit time spent today 15 minutes  Greater than 50% of total time was spent with the patient and / or family counseling and / or coordination of care  A description of the counseling / coordination of care: review of today's case

## 2021-06-07 NOTE — ANESTHESIA POSTPROCEDURE EVALUATION
Post-Op Assessment Note    CV Status:  Stable  Pain Score: 0    Pain management: adequate     Mental Status:  Awake and sleepy   Hydration Status:  Euvolemic   PONV Controlled:  Controlled   Airway Patency:  Patent      Post Op Vitals Reviewed: Yes      Staff: CRNA, Anesthesiologist         No complications documented      BP   139/65   Temp      Pulse  78   Resp  14   SpO2   98

## 2021-06-08 LAB
ANION GAP SERPL CALCULATED.3IONS-SCNC: 11 MMOL/L (ref 4–13)
BUN SERPL-MCNC: 14 MG/DL (ref 5–25)
CALCIUM SERPL-MCNC: 8.8 MG/DL (ref 8.3–10.1)
CHLORIDE SERPL-SCNC: 102 MMOL/L (ref 100–108)
CO2 SERPL-SCNC: 28 MMOL/L (ref 21–32)
CREAT FLD-MCNC: 13.7 MG/DL
CREAT SERPL-MCNC: 1.64 MG/DL (ref 0.6–1.3)
ERYTHROCYTE [DISTWIDTH] IN BLOOD BY AUTOMATED COUNT: 11.7 % (ref 11.6–15.1)
GFR SERPL CREATININE-BSD FRML MDRD: 53 ML/MIN/1.73SQ M
GLUCOSE SERPL-MCNC: 132 MG/DL (ref 65–140)
HCT VFR BLD AUTO: 36.5 % (ref 36.5–49.3)
HGB BLD-MCNC: 12.1 G/DL (ref 12–17)
MCH RBC QN AUTO: 33 PG (ref 26.8–34.3)
MCHC RBC AUTO-ENTMCNC: 33.2 G/DL (ref 31.4–37.4)
MCV RBC AUTO: 100 FL (ref 82–98)
PLATELET # BLD AUTO: 172 THOUSANDS/UL (ref 149–390)
PMV BLD AUTO: 12.3 FL (ref 8.9–12.7)
POTASSIUM SERPL-SCNC: 3.1 MMOL/L (ref 3.5–5.3)
RBC # BLD AUTO: 3.67 MILLION/UL (ref 3.88–5.62)
SODIUM SERPL-SCNC: 141 MMOL/L (ref 136–145)
WBC # BLD AUTO: 10.37 THOUSAND/UL (ref 4.31–10.16)

## 2021-06-08 PROCEDURE — 80048 BASIC METABOLIC PNL TOTAL CA: CPT | Performed by: UROLOGY

## 2021-06-08 PROCEDURE — 0T9B70Z DRAINAGE OF BLADDER WITH DRAINAGE DEVICE, VIA NATURAL OR ARTIFICIAL OPENING: ICD-10-PCS | Performed by: UROLOGY

## 2021-06-08 PROCEDURE — 99024 POSTOP FOLLOW-UP VISIT: CPT | Performed by: NURSE PRACTITIONER

## 2021-06-08 PROCEDURE — 82570 ASSAY OF URINE CREATININE: CPT | Performed by: NURSE PRACTITIONER

## 2021-06-08 PROCEDURE — 85027 COMPLETE CBC AUTOMATED: CPT | Performed by: UROLOGY

## 2021-06-08 RX ORDER — KETOROLAC TROMETHAMINE 30 MG/ML
15 INJECTION, SOLUTION INTRAMUSCULAR; INTRAVENOUS ONCE
Status: COMPLETED | OUTPATIENT
Start: 2021-06-08 | End: 2021-06-08

## 2021-06-08 RX ORDER — ATROPA BELLADONNA AND OPIUM 16.2; 3 MG/1; MG/1
30 SUPPOSITORY RECTAL ONCE
Status: COMPLETED | OUTPATIENT
Start: 2021-06-08 | End: 2021-06-08

## 2021-06-08 RX ORDER — GABAPENTIN 100 MG/1
100 CAPSULE ORAL 3 TIMES DAILY
Status: DISCONTINUED | OUTPATIENT
Start: 2021-06-08 | End: 2021-06-09 | Stop reason: HOSPADM

## 2021-06-08 RX ORDER — METOCLOPRAMIDE HYDROCHLORIDE 5 MG/ML
5 INJECTION INTRAMUSCULAR; INTRAVENOUS EVERY 6 HOURS SCHEDULED
Status: DISCONTINUED | OUTPATIENT
Start: 2021-06-08 | End: 2021-06-09 | Stop reason: HOSPADM

## 2021-06-08 RX ADMIN — HEPARIN SODIUM 5000 UNITS: 5000 INJECTION INTRAVENOUS; SUBCUTANEOUS at 06:57

## 2021-06-08 RX ADMIN — ACETAMINOPHEN 650 MG: 325 TABLET, FILM COATED ORAL at 11:29

## 2021-06-08 RX ADMIN — OXYCODONE HYDROCHLORIDE 10 MG: 10 TABLET ORAL at 03:15

## 2021-06-08 RX ADMIN — GABAPENTIN 100 MG: 100 CAPSULE ORAL at 21:19

## 2021-06-08 RX ADMIN — SENNOSIDES 8.6 MG: 8.6 TABLET ORAL at 08:13

## 2021-06-08 RX ADMIN — ATROPA BELLADONNA AND OPIUM 1 SUPPOSITORY: 16.2; 3 SUPPOSITORY RECTAL at 08:12

## 2021-06-08 RX ADMIN — METOCLOPRAMIDE HYDROCHLORIDE 5 MG: 5 INJECTION INTRAMUSCULAR; INTRAVENOUS at 09:39

## 2021-06-08 RX ADMIN — OXYBUTYNIN CHLORIDE 5 MG: 5 TABLET, EXTENDED RELEASE ORAL at 04:34

## 2021-06-08 RX ADMIN — DOCUSATE SODIUM 100 MG: 100 CAPSULE, LIQUID FILLED ORAL at 08:13

## 2021-06-08 RX ADMIN — METOCLOPRAMIDE HYDROCHLORIDE 5 MG: 5 INJECTION INTRAMUSCULAR; INTRAVENOUS at 17:20

## 2021-06-08 RX ADMIN — AMLODIPINE BESYLATE 5 MG: 5 TABLET ORAL at 06:21

## 2021-06-08 RX ADMIN — LOSARTAN POTASSIUM 100 MG: 50 TABLET, FILM COATED ORAL at 06:20

## 2021-06-08 RX ADMIN — HYDROMORPHONE HYDROCHLORIDE 0.5 MG: 1 INJECTION, SOLUTION INTRAMUSCULAR; INTRAVENOUS; SUBCUTANEOUS at 16:19

## 2021-06-08 RX ADMIN — HYDROCHLOROTHIAZIDE 25 MG: 25 TABLET ORAL at 06:20

## 2021-06-08 RX ADMIN — HEPARIN SODIUM 5000 UNITS: 5000 INJECTION INTRAVENOUS; SUBCUTANEOUS at 13:49

## 2021-06-08 RX ADMIN — BACITRACIN, NEOMYCIN, POLYMYXIN B 1 SMALL APPLICATION: 400; 3.5; 5 OINTMENT TOPICAL at 17:21

## 2021-06-08 RX ADMIN — OXYCODONE HYDROCHLORIDE 5 MG: 5 TABLET ORAL at 13:48

## 2021-06-08 RX ADMIN — GABAPENTIN 100 MG: 100 CAPSULE ORAL at 17:19

## 2021-06-08 RX ADMIN — BACITRACIN, NEOMYCIN, POLYMYXIN B 1 SMALL APPLICATION: 400; 3.5; 5 OINTMENT TOPICAL at 08:14

## 2021-06-08 RX ADMIN — HYDROMORPHONE HYDROCHLORIDE 0.5 MG: 1 INJECTION, SOLUTION INTRAMUSCULAR; INTRAVENOUS; SUBCUTANEOUS at 04:28

## 2021-06-08 RX ADMIN — METOCLOPRAMIDE HYDROCHLORIDE 5 MG: 5 INJECTION INTRAMUSCULAR; INTRAVENOUS at 21:19

## 2021-06-08 RX ADMIN — KETOROLAC TROMETHAMINE 15 MG: 30 INJECTION, SOLUTION INTRAMUSCULAR at 06:52

## 2021-06-08 RX ADMIN — DOCUSATE SODIUM 100 MG: 100 CAPSULE, LIQUID FILLED ORAL at 17:19

## 2021-06-08 RX ADMIN — SODIUM CHLORIDE, SODIUM LACTATE, POTASSIUM CHLORIDE, AND CALCIUM CHLORIDE 125 ML/HR: .6; .31; .03; .02 INJECTION, SOLUTION INTRAVENOUS at 11:27

## 2021-06-08 RX ADMIN — PHENAZOPYRIDINE 200 MG: 100 TABLET ORAL at 11:29

## 2021-06-08 RX ADMIN — SODIUM CHLORIDE, SODIUM LACTATE, POTASSIUM CHLORIDE, AND CALCIUM CHLORIDE 125 ML/HR: .6; .31; .03; .02 INJECTION, SOLUTION INTRAVENOUS at 03:16

## 2021-06-08 RX ADMIN — ACETAMINOPHEN 650 MG: 325 TABLET, FILM COATED ORAL at 17:20

## 2021-06-08 RX ADMIN — HEPARIN SODIUM 5000 UNITS: 5000 INJECTION INTRAVENOUS; SUBCUTANEOUS at 21:20

## 2021-06-08 RX ADMIN — PHENAZOPYRIDINE 200 MG: 100 TABLET ORAL at 04:32

## 2021-06-08 RX ADMIN — ACETAMINOPHEN 650 MG: 325 TABLET, FILM COATED ORAL at 06:21

## 2021-06-08 RX ADMIN — SODIUM CHLORIDE, SODIUM LACTATE, POTASSIUM CHLORIDE, AND CALCIUM CHLORIDE 125 ML/HR: .6; .31; .03; .02 INJECTION, SOLUTION INTRAVENOUS at 19:32

## 2021-06-08 NOTE — PLAN OF CARE
Problem: PAIN - ADULT  Goal: Verbalizes/displays adequate comfort level or baseline comfort level  Description: Interventions:  - Encourage patient to monitor pain and request assistance  - Assess pain using appropriate pain scale  - Administer analgesics based on type and severity of pain and evaluate response  - Implement non-pharmacological measures as appropriate and evaluate response  - Consider cultural and social influences on pain and pain management  - Notify physician/advanced practitioner if interventions unsuccessful or patient reports new pain  Outcome: Progressing     Problem: INFECTION - ADULT  Goal: Absence or prevention of progression during hospitalization  Description: INTERVENTIONS:  - Assess and monitor for signs and symptoms of infection  - Monitor lab/diagnostic results  - Monitor all insertion sites, i e  indwelling lines, tubes, and drains  - Monitor endotracheal if appropriate and nasal secretions for changes in amount and color  - [place] appropriate cooling/warming therapies per order  - Administer medications as ordered  - Instruct and encourage patient and family to use good hand hygiene technique  - Identify and instruct in appropriate isolation precautions for identified infection/condition  Outcome: Progressing     Problem: SAFETY ADULT  Goal: Patient will remain free of falls  Description: INTERVENTIONS:  - Assess patient frequently for physical needs  -  Identify cognitive and physical deficits and behaviors that affect risk of falls    -  [place] fall precautions as indicated by assessment   - Educate patient/family on patient safety including physical limitations  - Instruct patient to call for assistance with activity based on assessment  - Modify environment to reduce risk of injury  - Consider OT/PT consult to assist with strengthening/mobility  Outcome: Progressing     Problem: GENITOURINARY - ADULT  Goal: Maintains or returns to baseline urinary function  Description: INTERVENTIONS:  - Assess urinary function  - Encourage oral fluids to ensure adequate hydration if ordered  - Administer IV fluids as ordered to ensure adequate hydration  - Administer ordered medications as needed  - Offer frequent toileting  - Follow urinary retention protocol if ordered  Outcome: Progressing  Goal: Absence of urinary retention  Description: INTERVENTIONS:  - Assess patients ability to void and empty bladder  - Monitor I/O  - Bladder scan as needed  - Discuss with physician/AP medications to alleviate retention as needed  - Discuss catheterization for long term situations as appropriate  Outcome: Progressing  Goal: Urinary catheter remains patent  Description: INTERVENTIONS:  - Assess patency of urinary catheter  - If patient has a chronic candelario, consider changing catheter if non-functioning  - Follow guidelines for intermittent irrigation of non-functioning urinary catheter  Outcome: Progressing

## 2021-06-08 NOTE — UTILIZATION REVIEW
Initial Clinical Review    Elective outpatient  surgical procedure  AND CHANGED 6/8/2021 1805 INPATIENT RE:  PATIENT NEEDS CONTINUED STAY POST OPERATIVE DAY ONE STATUS POST ROBOTIC LAPAROSCOPIC RADICAL PROSTATECTOMY AND PELVIC LYMPH NODE DISSECTION DUE TO INCREASING CREATININE WITH NEED FOR ONGOING MONITORING AND IVF; ONGOING PAIN WITH ADJUSTMENTS IN PAIN MEDICATION  Age/Sex: 63 y o  male     Surgery Date:  6/8/2021     Procedure: ROBOTIC LAPAROSCOPIC RADICAL PROSTATECTOMY AND PELVIC LYMPH NODE DISSECTION    Anesthesia: general     Operative Findings: No signs of local or regional spread around the prostate itself, however the lymph nodes are enlarged, and reactive, unclear if this is due to an infectious process versus neoplastic positive lymph nodes  A wide excision was performed without nerve sparing given the character of the tissue around the prostate and the need for wide excision given high risk prostate cancer  Watertight anastomosis at the end of today's case, blood loss roughly 100 milliliters    POD#1 Progress Note: 6/8/2021 CHANGED TO INPATIENT:   Patient is post operative day one status post robotic laparoscopic radical prostatectomy and pelvic lymph node dissection  Has intermittent sharp pain  Poor appetite, only small amounts  Jessie MAXWELL site drainage  MAXWELL output since midnight 40, 70, 70 and 120  On exam abdomen: mild tenderness in the lower abdomen and Incisional related pain  Abdomen is slightly taut in the upper quadrants but not overtly distended  Can palpate M manipulated easily  Dermabond punctures and incision sites are intact and well-approximated without evidence of dehiscence  MAXWELL to the left--serosanguineous  Wbc 10 37  H&H 12 1/36 5   K 3 1  Bun 14, creatinine 1 64  Plan is encourage OOB, pain management, no further toradol with rising creatinine,  Has used Dilaudid,  Add Reglan and neurontin    Check MAXWELL fluid creatinine to rule out leak, apply abdominal binder, maintain candelario   IVF in progress, trend BMP    POD#1 Progress Note:  6/9/2021:  Patient is post operative day two status post  robotic laparoscopic radical prostatectomy and pelvic lymph node dissection  K{ creatinine consistent with urine  Candelario was placed on traction  Today pain improved, passing gas  Candelario to straight drainage, MAXWELL drain  Possible dc later today  Admission Orders: Date/Time/Statement:   Admission Orders (From admission, onward)     Ordered        06/08/21 1805  Inpatient Admission  Once                 6/7/2021 0737 outpatient no charge bed  AND CHANGED 6/8/2021 1805 INPATIENT RE:  PATIENT NEEDS CONTINUED STAY POST OPERATIVE DAY ONE STATUS POST ROBOTIC LAPAROSCOPIC RADICAL PROSTATECTOMY AND PELVIC LYMPH NODE DISSECTION DUE TO INCREASING CREATININE WITH NEED FOR ONGOING MONITORING AND IVF; ONGOING PAIN WITH ADJUSTMENTS IN PAIN MEDICATION  Start   Ordered   06/08/21 1805  Inpatient Admission Once     Question Answer Comment   Level of Care Med Surg    Estimated length of stay More than 2 Midnights    Certification I certify that inpatient services are medically necessary for this patient for a duration of greater than two midnights  See H&P and MD Progress Notes for additional information about the patient's course of treatment          06/08/21 1805       Vital Signs: BP (!) 177/80 (BP Location: Left arm)   Pulse 87   Temp 98 4 °F (36 9 °C) (Oral)   Resp 18   Ht 5' 9" (1 753 m)   Wt 96 5 kg (212 lb 11 9 oz)   SpO2 93%   BMI 31 42 kg/m²    06/09/21 0830  99 °F (37 2 °C)  --  --  --  --  --  --  --  --  --  --  --   06/09/21 0815  --  --  --  --  --  --  --  --  --  None (Room air)  --  --   06/09/21 0720  99 9 °F (37 7 °C)  91  18  158/80  109  91 %  --  --  --  None (Room air)  --  Lying   06/09/21 0253  98 5 °F (36 9 °C)  89  18  151/72  --  93 %  --  --  --  None (Room air)  --  Lying   06/08/21 2210  98 °F (36 7 °C)  88  18  170/80  --  94 %  --  --  --  None (Room air)  --  Lying 06/08/21 1838  --  --  --  148/82  --  --  --  --  --  --  --  Lying   06/08/21 1837  99 8 °F (37 7 °C)  88  18  185/94Abnormal   132  91 %  --  --  --  None (Room air)         06/08/21 1151  99 7 °F (37 6 °C)  70  26Abnormal    140/69  97  94 %  --  --  --  None (Room air)  --  Lying   Resp: In and out sleeping during vitals  at 06/08/21 1151   06/08/21 0836  98 9 °F (37 2 °C)  74  20  134/80  --  93 %  --  --  --  None (Room air)  --  Lying   06/08/21 0558  98 7 °F (37 1 °C)  83  22  154/71  102  93 %  --  --  --  None (Room air)  --  Lying   06/08/21 0241  99 1 °F (37 3 °C)  80  18  169/81  116  97 %  --  --  --  None (Room air)  --  Lying   06/07/21 2211  98 9 °F (37 2 °C)  74  18  133/65  93  93 %  --  --  --  None (Room air)  --  Lying   06/07/21 2030  99 °F (37 2 °C)  74  18  131/66  92  94 %  --  --  --  None (Room air)  --  Lying   06/07/21 1930  98 6 °F (37 °C)  79  18  133/67  94  100 %  28  --  2 L/min  Nasal cannula  --  Lying   06/07/21 1830  98 8 °F (37 1 °C)  75  18  136/75  99  98 %  28  --  2 L/min  Nasal cannula  --         Pertinent Labs/Diagnostic Test Results:  No imaging      Ref  Range 6/8/2021 06:42 6/8/2021 12:14   CREATININE FLUID Latest Units: mg/dL  13 70     Results from last 7 days   Lab Units 06/08/21  0642   WBC Thousand/uL 10 37*   HEMOGLOBIN g/dL 12 1   HEMATOCRIT % 36 5   PLATELETS Thousands/uL 172     Results from last 7 days   Lab Units 06/08/21  0642   SODIUM mmol/L 141   POTASSIUM mmol/L 3 1*   CHLORIDE mmol/L 102   CO2 mmol/L 28   ANION GAP mmol/L 11   BUN mg/dL 14   CREATININE mg/dL 1 64*   EGFR ml/min/1 73sq m 53   CALCIUM mg/dL 8 8     Results from last 7 days   Lab Units 06/08/21  0642   GLUCOSE RANDOM mg/dL 132     Diet: Regular  Mobility: ambulate     DVT Prophylaxis: heparin  Bilateral SCDs       Medications/Pain Control:   Scheduled Medications:  acetaminophen, 650 mg, Oral, Q6H AMBAR  amLODIPine, 5 mg, Oral, Early Morning  docusate sodium, 100 mg, Oral, BID  fluticasone, 1 spray, Each Nare, Daily  gabapentin, 100 mg, Oral, TID  heparin (porcine), 5,000 Units, Subcutaneous, Q8H AMBAR  hydrochlorothiazide, 25 mg, Oral, Early Morning  losartan, 100 mg, Oral, Early Morning  metoclopramide, 5 mg, Intravenous, Q6H AMBAR  neomycin-bacitracin-polymyxin b, 1 small application, Topical, BID  senna, 1 tablet, Oral, Daily    belladonna-opium (B&O SUPPOSITORY) 16 2-30 mg suppository 1 suppository   Dose: 30 mg  Freq: Once Route: RE  Start: 06/08/21 0630 End: 06/08/21 0812    ceFAZolin (ANCEF) IVPB (premix in dextrose) 2,000 mg 50 mL   Dose: 2,000 mg  Freq: Every 8 hours Route: IV  Last Dose: 2,000 mg (06/07/21 2339)  Start: 06/07/21 1600 End: 06/08/21 0009    ketorolac (TORADOL) injection 15 mg   Dose: 15 mg  Freq: Once Route: IV  Start: 06/08/21 0630 End: 06/08/21 0388    phenazopyridine (PYRIDIUM) tablet 200 mg   Dose: 200 mg  Freq: 3 times daily with meals Route: PO  Start: 06/07/21 1315 End: 06/08/21 1416    Continuous IV Infusions:  lactated ringers, 125 mL/hr, Intravenous, Continuous      PRN Meds:  aluminum-magnesium hydroxide-simethicone, 30 mL, Oral, Q6H PRN  diphenhydrAMINE, 25 mg, Oral, Q6H PRN  HYDROmorphone, 0 5 mg, Intravenous, Q2H PRN - used x 2 6/8/2021   ondansetron, 4 mg, Intravenous, Q6H PRN  oxyCODONE, 10 mg, Oral, Q4H PRN - used x 1 6/7/2021, x 1 6/8/2021   oxyCODONE, 5 mg, Oral, Q4H PRN - used x 1 6/7/2021, x 1 6/8/2021     urinary catheter to [facility]    Network Utilization Review Department  ATTENTION: Please call with any questions or concerns to [phone] and carefully listen to the prompts so that you are directed to the right person  All voicemails are confidential   Farooq Sandoval all requests for admission clinical reviews, approved or denied determinations and any other requests to dedicated fax number below belonging to the campus where the patient is receiving treatment   List of dedicated fax numbers for the Facilities:  [facility] ADMISSION DENIALS (Administrative/Medical Necessity) [phone]   [address] (Maternity/NICU/Pediatrics) [address] [facility] [address] Lucas Middletonel Demetri [address] [id number] [place] [address] Tami [address] [facility] [address] [facility] [phone]

## 2021-06-08 NOTE — QUICK NOTE
Likely likely small leak given increasing drain outputs, recommend placing the Calixto catheter on traction, will maintain drain at this time, MAXWELL creatinine is pending  This likely also explains the slight acute kidney injury due to reabsorption of excreted creatinine within the urine

## 2021-06-08 NOTE — PLAN OF CARE
Problem: PAIN - ADULT  Goal: Verbalizes/displays adequate comfort level or baseline comfort level  Description: Interventions:  - Encourage patient to monitor pain and request assistance  - Assess pain using appropriate pain scale  - Administer analgesics based on type and severity of pain and evaluate response  - Implement non-pharmacological measures as appropriate and evaluate response  - Consider cultural and social influences on pain and pain management  - Notify physician/advanced practitioner if interventions unsuccessful or patient reports new pain  Outcome: Progressing     Problem: INFECTION - ADULT  Goal: Absence or prevention of progression during hospitalization  Description: INTERVENTIONS:  - Assess and monitor for signs and symptoms of infection  - Monitor lab/diagnostic results  - Monitor all insertion sites, i e  indwelling lines, tubes, and drains  - Monitor endotracheal if appropriate and nasal secretions for changes in amount and color  - [place] appropriate cooling/warming therapies per order  - Administer medications as ordered  - Instruct and encourage patient and family to use good hand hygiene technique  - Identify and instruct in appropriate isolation precautions for identified infection/condition  Outcome: Progressing     Problem: SAFETY ADULT  Goal: Patient will remain free of falls  Description: INTERVENTIONS:  - Assess patient frequently for physical needs  -  Identify cognitive and physical deficits and behaviors that affect risk of falls    -  [place] fall precautions as indicated by assessment   - Educate patient/family on patient safety including physical limitations  - Instruct patient to call for assistance with activity based on assessment  - Modify environment to reduce risk of injury  - Consider OT/PT consult to assist with strengthening/mobility  Outcome: Progressing     Problem: GENITOURINARY - ADULT  Goal: Maintains or returns to baseline urinary function  Description: INTERVENTIONS:  - Assess urinary function  - Encourage oral fluids to ensure adequate hydration if ordered  - Administer IV fluids as ordered to ensure adequate hydration  - Administer ordered medications as needed  - Offer frequent toileting  - Follow urinary retention protocol if ordered  Outcome: Progressing  Goal: Absence of urinary retention  Description: INTERVENTIONS:  - Assess patients ability to void and empty bladder  - Monitor I/O  - Bladder scan as needed  - Discuss with physician/AP medications to alleviate retention as needed  - Discuss catheterization for long term situations as appropriate  Outcome: Progressing  Goal: Urinary catheter remains patent  Description: INTERVENTIONS:  - Assess patency of urinary catheter  - If patient has a chronic candelario, consider changing catheter if non-functioning  - Follow guidelines for intermittent irrigation of non-functioning urinary catheter  Outcome: Progressing

## 2021-06-08 NOTE — PHYSICIAN ADVISOR
Current patient class: Outpatient Surgery  The patient is currently on Hospital Day: 2 at [address]      This patient was originally admitted to the hospital under observation status  After admission the patient was reevaluated and determined to require further hospitalization  The patient is now documented to require at least a 2nd midnight in the hospital  As such the patient is now expected to satisfy the 2 midnight benchmark and is therefore eligible for inpatient admission  After review of the relevant documentation, labs, vital signs and test results, the patient is appropriate for INPATIENT ADMISSION  Admission to the hospital as an inpatient is a complex decision making process which requires the practitioner to consider the patients presenting complaint, history and physical examination and all relevant testing  With this in mind, in this case, the patient was deemed appropriate for INPATIENT ADMISSION  After review of the documentation and testing available at the time of the admission I concur with this clinical determination of medical necessity  Rationale is as follows: The patient is a 63 yrs Male who presented to the ED at 6/7/2021  6:37 AM with a chief complaint of   Prostate adenocarcinoma who came for a robotic assisted laparoscopic prostatectomy  On postop day 1 patient was noted to have some drainage around the MAXWELL drain it site for which pressure was applied  There was also a rise in serum creatinine  Patient still was not tolerating a good diet with having a poor appetite today  Urology recommended placing a Calixto catheter on traction and maintain the drain at this time  MAXWELL creatinine   The plan is to keep the MAXWELL drain in for now and can reposition drain if needed if the leak persist   They are still going to says for Calixto drainage as well and a possibly of discharge tomorrow with drain if it still persists    Given the uncertainty on discharge with the patient still receiving ongoing inpatient care and monitoring after surgery I am recommending changed inpatient class    The patients vitals on arrival were   ED Triage Vitals   Temperature Pulse Respirations Blood Pressure SpO2   06/07/21 0656 06/07/21 0656 06/07/21 0656 06/07/21 0656 06/07/21 0656   97 6 °F (36 4 °C) 67 18 145/77 99 %      Temp Source Heart Rate Source Patient Position - Orthostatic VS BP Location FiO2 (%)   06/07/21 0656 06/07/21 0656 06/07/21 1830 06/07/21 1830 --   Temporal Monitor Lying Right arm       Pain Score       06/07/21 1245       5           Past Medical History:   Diagnosis Date    Hypertension      Past Surgical History:   Procedure Laterality Date    COLONOSCOPY      IN LAP,PROSTATECTOMY,RADICAL,W/NERVE SPARE,INCL ROBOTIC N/A 6/7/2021    Procedure: ROBOTIC LAPAROSCOPIC RADICAL PROSTATECTOMY AND PELVIC LYMPH NODE DISSECTION;  Surgeon: Shawanda Ramos MD;  Location: [facility];  Service: Urology       The patient was admitted to the hospital at N/A on N/A for the following diagnosis:  Prostate cancer [facility]) Rajiv Sandovaler have been placed to:   None    Vitals:    06/08/21 0558 06/08/21 0836 06/08/21 1151 06/08/21 1528   BP: 154/71 134/80 140/69 147/69   BP Location: Right arm Left arm Right arm Left arm   Pulse: 83 74 70 82   Resp: 22 20 (!) 26 18   Temp: 98 7 °F (37 1 °C) 98 9 °F (37 2 °C) 99 7 °F (37 6 °C) 99 °F (37 2 °C)   TempSrc: Oral Oral Oral Oral   SpO2: 93% 93% 94% 93%   Weight:       Height:           Most recent labs:    Recent Labs     06/08/21  0642   WBC 10 37*   HGB 12 1   HCT 36 5      K 3 1*   CALCIUM 8 8   BUN 14   CREATININE 1 64*       Scheduled Meds:  Current Facility-Administered Medications   Medication Dose Route Frequency Provider Last Rate    acetaminophen  650 mg Oral Q6H [address] Shawanda Ramos MD      aluminum-magnesium hydroxide-simethicone  30 mL Oral Q6H PRN Shawanda Ramos MD      amLODIPine  5 mg Oral Early Morning Alejandro Shefali MD Eduardo      diphenhydrAMINE  25 mg Oral Q6H PRN Sridhar Aceves MD      docusate sodium  100 mg Oral BID Sridhar Aceves MD      fluticasone  1 spray Each Nare Daily Sridhar Aceves MD      gabapentin  100 mg Oral TID RUSTY Ward      heparin (porcine)  5,000 Units Subcutaneous [facility] Sridhar Aceves MD      hydrochlorothiazide  25 mg Oral Early Morning Sridhar Aceves MD      HYDROmorphone  0 5 mg Intravenous Q2H PRN Sridhar Aceves MD      lactated ringers  125 mL/hr Intravenous Continuous Sridhar Aceves  mL/hr (06/08/21 1127)    losartan  100 mg Oral Early Morning Sridhar Aceves MD      metoclopramide  5 mg Intravenous Q6H Mary Jo Curry, RUSTY      neomycin-bacitracin-polymyxin b  1 small application Topical BID Sridhar Aceves MD      ondansetron  4 mg Intravenous Q6H PRN Sridhar Aceves MD      oxyCODONE  10 mg Oral Q4H PRN Sridhar Aceves MD      oxyCODONE  5 mg Oral Q4H PRN Sridhar Aceves MD      senna  1 tablet Oral Daily Sridhar Aceves MD       Continuous Infusions:lactated ringers, 125 mL/hr, Last Rate: 125 mL/hr (06/08/21 1127)      PRN Meds: aluminum-magnesium hydroxide-simethicone    diphenhydrAMINE    HYDROmorphone    ondansetron    oxyCODONE    oxyCODONE    Surgical procedures (if appropriate):  Procedure(s):  ROBOTIC LAPAROSCOPIC RADICAL PROSTATECTOMY AND PELVIC LYMPH NODE DISSECTION

## 2021-06-08 NOTE — PLAN OF CARE
Problem: PAIN - ADULT  Goal: Verbalizes/displays adequate comfort level or baseline comfort level  Description: Interventions:  - Encourage patient to monitor pain and request assistance  - Assess pain using appropriate pain scale  - Administer analgesics based on type and severity of pain and evaluate response  - Implement non-pharmacological measures as appropriate and evaluate response  - Consider cultural and social influences on pain and pain management  - Notify physician/advanced practitioner if interventions unsuccessful or patient reports new pain  Outcome: Progressing     Problem: INFECTION - ADULT  Goal: Absence or prevention of progression during hospitalization  Description: INTERVENTIONS:  - Assess and monitor for signs and symptoms of infection  - Monitor lab/diagnostic results  - Monitor all insertion sites, i e  indwelling lines, tubes, and drains  - Monitor endotracheal if appropriate and nasal secretions for changes in amount and color  - [place] appropriate cooling/warming therapies per order  - Administer medications as ordered  - Instruct and encourage patient and family to use good hand hygiene technique  - Identify and instruct in appropriate isolation precautions for identified infection/condition  Outcome: Progressing     Problem: SAFETY ADULT  Goal: Patient will remain free of falls  Description: INTERVENTIONS:  - Assess patient frequently for physical needs  -  Identify cognitive and physical deficits and behaviors that affect risk of falls    -  [place] fall precautions as indicated by assessment   - Educate patient/family on patient safety including physical limitations  - Instruct patient to call for assistance with activity based on assessment  - Modify environment to reduce risk of injury  - Consider OT/PT consult to assist with strengthening/mobility  Outcome: Progressing     Problem: GENITOURINARY - ADULT  Goal: Maintains or returns to baseline urinary function  Description: INTERVENTIONS:  - Assess urinary function  - Encourage oral fluids to ensure adequate hydration if ordered  - Administer IV fluids as ordered to ensure adequate hydration  - Administer ordered medications as needed  - Offer frequent toileting  - Follow urinary retention protocol if ordered  Outcome: Progressing  Goal: Absence of urinary retention  Description: INTERVENTIONS:  - Assess patients ability to void and empty bladder  - Monitor I/O  - Bladder scan as needed  - Discuss with physician/AP medications to alleviate retention as needed  - Discuss catheterization for long term situations as appropriate  Outcome: Progressing  Goal: Urinary catheter remains patent  Description: INTERVENTIONS:  - Assess patency of urinary catheter  - If patient has a chronic candelario, consider changing catheter if non-functioning  - Follow guidelines for intermittent irrigation of non-functioning urinary catheter  Outcome: Progressing

## 2021-06-08 NOTE — PROGRESS NOTES
Progress Note - Marques Rodriguez February 55 y o  male MRN: [id number]    Unit/Bed#: S -11 Encounter: [id number]      Assessment:  Sneha Jay is a pleasant 67-year-old male with history of adenocarcinoma of the prostate, postoperative day 1 robotic assisted laparoscopic prostatectomy  Patient is afebrile, hemodynamically stable and in no distress  Reporting occasional lower abdominal pain overnight, not accompanied by flank pain or nausea/vomiting  Nursing staff foam so reporting vasu MAXWELL site drainage  Pressure dressing applied  Site intact  Hemoglobin 12 7 yesterday--12 1 today  Rise in serum creatinine from 1-2--1 6 for this a m  Jaylene Phillipd Patient tolerating small amounts of solid this a carmina Mariee Tanna Poor appetite  Has not been out of bed  Calixto catheter- orange tinge secondary to Pyridium administration  575 mL this morning  MAXWELL outputs since midnight 40 mL, 70 mL, 70 mL, & 120ml    Plan:  1  Continue routine postoperative care including DVT prophylaxis and incentive spirometry use  2  Encourage out of bed today  3  Pain management  Ditropan discontinued  Continue Pyridium, p r n  IV pain and oral medication  4  Toradol x1 administered early this AM   Discontinued for renal function  5  Added Reglan to enhanced bowel motility and Neurontin  6  Obtain stat MAXWELL fluid creatinine  R/O leak  7  Apply abdominal binder  8  Maintain Calixto catheter  Do not remove  9  Will need to stay overnight  Subjective:    I get this sharp pain that lasts for a while and then goes away      Objective:     Vitals: Blood pressure 134/80, pulse 74, temperature 98 9 °F (37 2 °C), temperature source Oral, resp  rate 20, height 5' 9" (1 753 m), weight 96 2 kg (212 lb), SpO2 93 %  ,Body mass index is 31 31 kg/m²        Intake/Output Summary (Last 24 hours) at 6/8/2021 0981  Last data filed at 6/8/2021 0828  Gross per 24 hour   Intake 3238 ml   Output 3585 ml   Net -347 ml       Physical Exam: General appearance: alert and oriented, in no acute distress, appears stated age, cooperative and no distress  Head: Normocephalic, without obvious abnormality, atraumatic  Neck: no adenopathy, no carotid bruit, no JVD, supple, symmetrical, trachea midline and thyroid not enlarged, symmetric, no tenderness/mass/nodules  Lungs: diminished breath sounds  Heart: regular rate and rhythm, S1, S2 normal, no murmur, click, rub or gallop  Abdomen: abnormal findings:  mild tenderness in the lower abdomen and Incisional related pain  Abdomen is slightly taut in the upper quadrants but not overtly distended  Can palpate M manipulated easily  Dermabond punctures and incision sites are intact and well-approximated without evidence of dehiscence  MAXWELL to the left--serosanguineous  Extremities: extremities normal, warm and well-perfused; no cyanosis, clubbing, or edema  Pulses: 2+ and symmetric  Neurologic: Grossly normal  Calixto & MAXWELL--see below               Invasive Devices     Peripheral Intravenous Line            Peripheral IV 06/07/21 Left Hand 1 day    Peripheral IV 06/07/21 Right Hand 1 day          Drain            Closed/Suction Drain LLQ Bulb 19 Fr  less than 1 day    Urethral Catheter 20 Fr  less than 1 day              Lab Results   Component Value Date    WBC 10 37 (H) 06/08/2021    HGB 12 1 06/08/2021    HCT 36 5 06/08/2021     (H) 06/08/2021     06/08/2021     Lab Results   Component Value Date    SODIUM 141 06/08/2021    K 3 1 (L) 06/08/2021     06/08/2021    CO2 28 06/08/2021    BUN 14 06/08/2021    CREATININE 1 64 (H) 06/08/2021    GLUC 132 06/08/2021    CALCIUM 8 8 06/08/2021       Lab, Imaging and other studies: I have personally reviewed pertinent reports      VTE Pharmacologic Prophylaxis: Heparin  VTE Mechanical Prophylaxis: sequential compression device

## 2021-06-08 NOTE — QUICK NOTE
Mikael Carrera February is a pleasant 48-year-old male with history of adenocarcinoma  Postoperative day 1 RALP  Suspected urine leak secondary to appearance of MAXWELL drainage  MAXWELL creatinine obtained--13 7  Calixto to traction  Otherwise will continue routine postoperative care  Patient remained stable  Under bed and improved from this a m Dara Soulier

## 2021-06-09 ENCOUNTER — TELEPHONE (OUTPATIENT)
Dept: UROLOGY | Facility: CLINIC | Age: 56
End: 2021-06-09

## 2021-06-09 ENCOUNTER — TRANSCRIBE ORDERS (OUTPATIENT)
Dept: SLEEP CENTER | Facility: CLINIC | Age: 56
End: 2021-06-09

## 2021-06-09 VITALS
SYSTOLIC BLOOD PRESSURE: 177 MMHG | HEIGHT: 69 IN | OXYGEN SATURATION: 93 % | BODY MASS INDEX: 31.51 KG/M2 | RESPIRATION RATE: 18 BRPM | DIASTOLIC BLOOD PRESSURE: 80 MMHG | WEIGHT: 212.74 LBS | HEART RATE: 87 BPM | TEMPERATURE: 98.4 F

## 2021-06-09 PROCEDURE — 99024 POSTOP FOLLOW-UP VISIT: CPT | Performed by: NURSE PRACTITIONER

## 2021-06-09 RX ADMIN — SENNOSIDES 8.6 MG: 8.6 TABLET ORAL at 08:20

## 2021-06-09 RX ADMIN — HEPARIN SODIUM 5000 UNITS: 5000 INJECTION INTRAVENOUS; SUBCUTANEOUS at 05:50

## 2021-06-09 RX ADMIN — HYDROCHLOROTHIAZIDE 25 MG: 25 TABLET ORAL at 05:50

## 2021-06-09 RX ADMIN — SODIUM CHLORIDE, SODIUM LACTATE, POTASSIUM CHLORIDE, AND CALCIUM CHLORIDE 125 ML/HR: .6; .31; .03; .02 INJECTION, SOLUTION INTRAVENOUS at 03:14

## 2021-06-09 RX ADMIN — DOCUSATE SODIUM 100 MG: 100 CAPSULE, LIQUID FILLED ORAL at 08:20

## 2021-06-09 RX ADMIN — LOSARTAN POTASSIUM 100 MG: 50 TABLET, FILM COATED ORAL at 05:50

## 2021-06-09 RX ADMIN — SODIUM CHLORIDE, SODIUM LACTATE, POTASSIUM CHLORIDE, AND CALCIUM CHLORIDE 125 ML/HR: .6; .31; .03; .02 INJECTION, SOLUTION INTRAVENOUS at 11:15

## 2021-06-09 RX ADMIN — GABAPENTIN 100 MG: 100 CAPSULE ORAL at 08:20

## 2021-06-09 RX ADMIN — ACETAMINOPHEN 650 MG: 325 TABLET, FILM COATED ORAL at 05:50

## 2021-06-09 RX ADMIN — METOCLOPRAMIDE HYDROCHLORIDE 5 MG: 5 INJECTION INTRAMUSCULAR; INTRAVENOUS at 08:21

## 2021-06-09 RX ADMIN — BACITRACIN, NEOMYCIN, POLYMYXIN B 1 SMALL APPLICATION: 400; 3.5; 5 OINTMENT TOPICAL at 08:20

## 2021-06-09 RX ADMIN — AMLODIPINE BESYLATE 5 MG: 5 TABLET ORAL at 05:50

## 2021-06-09 RX ADMIN — ACETAMINOPHEN 650 MG: 325 TABLET, FILM COATED ORAL at 11:14

## 2021-06-09 RX ADMIN — METOCLOPRAMIDE HYDROCHLORIDE 5 MG: 5 INJECTION INTRAMUSCULAR; INTRAVENOUS at 03:12

## 2021-06-09 RX ADMIN — HEPARIN SODIUM 5000 UNITS: 5000 INJECTION INTRAVENOUS; SUBCUTANEOUS at 14:03

## 2021-06-09 RX ADMIN — ACETAMINOPHEN 650 MG: 325 TABLET, FILM COATED ORAL at 00:31

## 2021-06-09 NOTE — PLAN OF CARE
Problem: PAIN - ADULT  Goal: Verbalizes/displays adequate comfort level or baseline comfort level  Description: Interventions:  - Encourage patient to monitor pain and request assistance  - Assess pain using appropriate pain scale  - Administer analgesics based on type and severity of pain and evaluate response  - Implement non-pharmacological measures as appropriate and evaluate response  - Consider cultural and social influences on pain and pain management  - Notify physician/advanced practitioner if interventions unsuccessful or patient reports new pain  Outcome: Progressing     Problem: INFECTION - ADULT  Goal: Absence or prevention of progression during hospitalization  Description: INTERVENTIONS:  - Assess and monitor for signs and symptoms of infection  - Monitor lab/diagnostic results  - Monitor all insertion sites, i e  indwelling lines, tubes, and drains  - Monitor endotracheal if appropriate and nasal secretions for changes in amount and color  - [place] appropriate cooling/warming therapies per order  - Administer medications as ordered  - Instruct and encourage patient and family to use good hand hygiene technique  - Identify and instruct in appropriate isolation precautions for identified infection/condition  Outcome: Progressing  Goal: Absence of fever/infection during neutropenic period  Description: INTERVENTIONS:  - Monitor WBC    Outcome: Progressing     Problem: SAFETY ADULT  Goal: Patient will remain free of falls  Description: INTERVENTIONS:  - Assess patient frequently for physical needs  -  Identify cognitive and physical deficits and behaviors that affect risk of falls    -  [place] fall precautions as indicated by assessment   - Educate patient/family on patient safety including physical limitations  - Instruct patient to call for assistance with activity based on assessment  - Modify environment to reduce risk of injury  - Consider OT/PT consult to assist with strengthening/mobility  Outcome: Progressing  Goal: Maintain or return to baseline ADL function  Description: INTERVENTIONS:  -  Assess patient's ability to carry out ADLs; assess patient's baseline for ADL function and identify physical deficits which impact ability to perform ADLs (bathing, care of mouth/teeth, toileting, grooming, dressing, etc )  - Assess/evaluate cause of self-care deficits   - Assess range of motion  - Assess patient's mobility; develop plan if impaired  - Assess patient's need for assistive devices and provide as appropriate  - Encourage maximum independence but intervene and supervise when necessary  - Involve family in performance of ADLs  - Assess for home care needs following discharge   - Consider OT consult to assist with ADL evaluation and planning for discharge  - Provide patient education as appropriate  Outcome: Progressing  Goal: Maintain or return mobility status to optimal level  Description: INTERVENTIONS:  - Assess patient's baseline mobility status (ambulation, transfers, stairs, etc )    - Identify cognitive and physical deficits and behaviors that affect mobility  - Identify mobility aids required to assist with transfers and/or ambulation (gait belt, sit-to-stand, lift, walker, cane, etc )  - [place] fall precautions as indicated by assessment  - Record patient progress and toleration of activity level on Mobility SBAR; progress patient to next Phase/Stage  - Instruct patient to call for assistance with activity based on assessment  - Consider rehabilitation consult to assist with strengthening/weightbearing, etc   Outcome: Progressing     Problem: GENITOURINARY - ADULT  Goal: Maintains or returns to baseline urinary function  Description: INTERVENTIONS:  - Assess urinary function  - Encourage oral fluids to ensure adequate hydration if ordered  - Administer IV fluids as ordered to ensure adequate hydration  - Administer ordered medications as needed  - Offer frequent toileting  - Follow urinary retention protocol if ordered  Outcome: Progressing  Goal: Absence of urinary retention  Description: INTERVENTIONS:  - Assess patients ability to void and empty bladder  - Monitor I/O  - Bladder scan as needed  - Discuss with physician/AP medications to alleviate retention as needed  - Discuss catheterization for long term situations as appropriate  Outcome: Progressing  Goal: Urinary catheter remains patent  Description: INTERVENTIONS:  - Assess patency of urinary catheter  - If patient has a chronic candelario, consider changing catheter if non-functioning  - Follow guidelines for intermittent irrigation of non-functioning urinary catheter  Outcome: Progressing     Problem: SKIN/TISSUE INTEGRITY - ADULT  Goal: Skin integrity remains intact  Description: INTERVENTIONS  - Identify patients at risk for skin breakdown  - Assess and monitor skin integrity  - Assess and monitor nutrition and hydration status  - Monitor labs (i e  albumin)  - Assess for incontinence   - Turn and reposition patient  - Assist with mobility/ambulation  - Relieve pressure over bony prominences  - Avoid friction and shearing  - Provide appropriate hygiene as needed including keeping skin clean and dry  - Evaluate need for skin moisturizer/barrier cream  - Collaborate with interdisciplinary team (i e  Nutrition, Rehabilitation, etc )   - Patient/family teaching  Outcome: Progressing  Goal: Incision(s), wounds(s) or drain site(s) healing without S/S of infection  Description: INTERVENTIONS  - Assess and document risk factors for skin impairment   - Assess and document dressing, incision, wound bed, drain sites and surrounding tissue  - Consider nutrition services referral as needed  - Oral mucous membranes remain intact  - Provide patient/ family education  Outcome: Progressing  Goal: Oral mucous membranes remain intact  Description: INTERVENTIONS  - Assess oral mucosa and hygiene practices  - Implement preventative oral hygiene regimen  - Implement oral medicated treatments as ordered  - Initiate Nutrition services referral as needed  Outcome: Progressing     Problem: DISCHARGE PLANNING  Goal: Discharge to home or other facility with appropriate resources  Description: INTERVENTIONS:  - Identify barriers to discharge w/patient and caregiver  - Arrange for needed discharge resources and transportation as appropriate  - Identify discharge learning needs (meds, wound care, etc )  - Arrange for interpretive services to assist at discharge as needed  - Refer to Case Management Department for coordinating discharge planning if the patient needs post-hospital services based on physician/advanced practitioner order or complex needs related to functional status, cognitive ability, or social support system  Outcome: Progressing

## 2021-06-09 NOTE — TELEPHONE ENCOUNTER
Patient is scheduled to see Dr Demetrius Rao on 6/17 for post op visit  Patient remains inpatient at this time  Will monitor for discharge and follow up at that time to confirm appointment

## 2021-06-09 NOTE — DISCHARGE INSTRUCTIONS
Robot Assisted Laparoscopic Prostatectomy   WHAT YOU NEED TO KNOW:     Today you had a robot assisted laparoscopic radical prostatectomy with bilateral pelvic lymph node dissection  This went well  We performed a wide excision of your prostate and surrounding tissue consistent with your high risk prostate cancer, Sondra 8  I did perform a bilateral pelvic lymph node dissection, I am concerned that some of these lymph nodes may be positive given their appearance at the time of surgery  The final proof of whether not these are positive will be based on the pathologic analysis  The real test of the success of today's operation will be your postoperative PSA, which we hope is less than 0 2 at roughly 6 weeks after surgery  If it is detectable, this indicates that microscopic disease not visible at the time of surgery is present and we would recommend that you heal from surgery and then receive radiation therapy  I would like you to wear a catheter for the next 2 weeks    I look forward to seeing you back in the office to review your final pathology  I am sending you good thoughts as you recover,    Dr Britney Das  Robot-assisted laparoscopic prostatectomy (RALP) is surgery to remove your prostate gland through small incisions in your abdomen  RALP is done with a machine that is controlled by your surgeon  The machine has mechanical arms that use small tools to remove your prostate  DISCHARGE INSTRUCTIONS:   Medicines:   · Pain medicine: You may be given a prescription medicine to decrease pain  Do not wait until the pain is severe before you take this medicine  · Stool softeners: This medicine makes it easier for you to have a bowel movement  You may need this medicine to treat or prevent constipation  · Antibiotics: This medicine is given to fight or prevent an infection caused by bacteria  Always take your antibiotics exactly as ordered by your healthcare provider   Do not stop taking your medicine unless directed by your healthcare provider  Never save antibiotics or take leftover antibiotics that were given to you for another illness  · Take your medicine as directed  Contact your healthcare provider if you think your medicine is not helping or if you have side effects  Tell him or her if you are allergic to any medicine  Keep a list of the medicines, vitamins, and herbs you take  Include the amounts, and when and why you take them  Bring the list or the pill bottles to follow-up visits  Carry your medicine list with you in case of an emergency  Follow up with your healthcare provider or uro-oncologist in 1 week or as directed: You will need your urinary catheter removed  Tests will show if any cancer remains in your body after surgery  Write down your questions so you remember to ask them during your visits  A Calixto catheter  is a tube put into your bladder to drain urine into a bag  Keep the bag below your waist  This will prevent urine from flowing back into your bladder and causing an infection or other problems  Also, keep the tube free of kinks so the urine will drain properly  Do not pull on the catheter  This can cause pain and bleeding, and may cause the catheter to come out  Wound care: Follow your healthcare provider's directions on how to care for your incisions  Ask when you can start showering or bathing  You will need to keep your stitches covered so they do not get wet  What to expect after surgery:   · Activities: You may feel like resting more after surgery  Slowly start to do more each day  Rest when you feel it is needed  ? Normal daily activities:  Ask your healthcare provider when it is safe to return to your normal daily activities  You may need to wait 4 to 6 weeks after surgery  Your healthcare provider will tell you about the activities you should avoid after your surgery   These may include driving while you are taking pain medicines or until your catheter is removed  You may also be given a weight restriction on lifting objects  ? Walking and other exercise:  Walking is an important activity to help with your recovery after surgery  Walking is a good way to improve your overall health and help you recover sooner  It also helps keep your blood flowing and reduces the risk of blood clots  Other types of exercise can also be an important part of your recovery  Ask about the exercises that are safe for you     ? Sexual activity:  Ask when it is safe to start having sexual intercourse again  You may have trouble having an erection  Your erections may return with time  Medicines and mechanical aids may help  Talk to your healthcare provider about these options  · Bladder control:  After surgery, you may have problems controlling when you urinate  Ask your healthcare provider about pads and liners that absorb urine while you recover  · Constipation:  You may have problems having bowel movements during your recovery  Ask your healthcare provider about diet changes if you are having problems with constipation  Contact your healthcare provider or uro-oncologist if:   · You have a fever  · Your pain is getting worse, even with medicine  · You have an incision that is red, swollen, or has pus coming from it  · You are urinating less than usual      · You have trouble urinating or having a bowel movement  · You have questions or concerns about your condition or care  Seek care immediately or call 911 if:   · You have more blood in your urine than you were told to expect, or you pass a blood clot  · You have an upset stomach or are vomiting  · You have painful swelling in your abdomen that does not go away  · You suddenly feel lightheaded and short of breath  · You have chest pain when you take a deep breath or cough  You cough up blood  · Your arm or leg feels warm, tender, and painful  It may look swollen and red      © Copyright [employer] 2018 Information is for [employer] use only and may not be sold, redistributed or otherwise used for commercial purposes  All illustrations and images included in CareNotes® are the copyrighted property of A D A M , Inc  or Cordell Mcbride   The above information is an  only  It is not intended as medical advice for individual conditions or treatments  Talk to your doctor, nurse or pharmacist before following any medical regimen to see if it is safe and effective for you  Calixto Catheter Placement and Care   WHAT YOU NEED TO KNOW:   A Calixto catheter is a sterile tube that is inserted into your bladder to drain urine  It is also called an indwelling urinary catheter  DISCHARGE INSTRUCTIONS:   Seek care immediately if:   · Your catheter comes out  · You suddenly have material that looks like sand in the tubing or drainage bag  · No urine is draining into the bag and you have checked the system  · You have pain in your hip, back, pelvis, or lower abdomen  · You are confused or cannot think clearly  Call your doctor or urologist if:   · You have a fever  · You have bladder spasms for more than 1 day after the catheter is placed  · You see blood in the tubing or drainage bag  · You have a rash or itching where the catheter tube is secured to your skin  · Urine leaks from or around the catheter, tubing, or drainage bag  · The closed drainage system has accidently come open or apart  · You see a layer of crystals inside the tubing  · You have questions or concerns about your condition or care  Care for your catheter and drainage bag: You can reduce your risk for infection and injury by caring for your catheter and drainage bag properly  · Wash your hands often  Wash before and after you touch your catheter, tubing, or drainage bag  Use soap and water  Wear clean disposable gloves when you care for your catheter or disconnect the drainage bag  Wash your hands before you prepare or eat food  · Clean your genital area 2 times every day  Clean your catheter area and anal opening after every bowel movement  ? For men:  Use a soapy cloth to clean the tip of your penis  Start where the catheter enters  Wipe backward making sure to pull back the foreskin  Then use a cloth with clear water in the same direction to clean away the soap  ? For women:  Use a soapy cloth to clean the area that the catheter enters your body  Make sure to separate your labia and wipe toward the anus  Then use a cloth with clear water and wipe in the same direction  · Secure the catheter tube  so you do not pull or move the catheter  This helps prevent pain and bladder spasms  Healthcare providers will show you how to use medical tape or a strap to secure the catheter tube to your body  · Keep a closed drainage system  Your catheter should always be attached to the drainage bag to form a closed system  Do not disconnect any part of the closed system unless you need to change the bag  · Keep the drainage bag below the level of your waist   This helps stop urine from moving back up the tubing and into your bladder  Do not loop or kink the tubing  This can cause urine to back up and collect in your bladder  Do not let the drainage bag touch or lie on the floor  · Empty the drainage bag when needed  The weight of a full drainage bag can be painful  Empty the drainage bag every 3 to 6 hours or when it is ? full  · Clean and change the drainage bag as directed  Ask your healthcare provider how often you should change the drainage bag and what cleaning solution to use  Wear disposable gloves when you change the bag  Do not allow the end of the catheter or tubing to touch anything  Clean the ends with an alcohol pad before you reconnect them  What to do if problems develop:   · No urine is draining into the bag:      ?  Check for kinks in the tubing and straighten them out  ? Check the tape or strap used to secure the catheter tube to your skin  Make sure it is not blocking the tube  ? Make sure you are not sitting or lying on the tubing  ? Make sure the urine bag is hanging below the level of your waist     · Urine leaks from or around the catheter, tubing, or drainage bag:  Check if the closed drainage system has accidently come open or apart  Clean the catheter and tubing ends with a new alcohol pad and reconnect them  Follow up with your doctor or urologist as directed:  Write down your questions so you remember to ask them during your visits  © Copyright [address] Information is for End User's use only and may not be sold, redistributed or otherwise used for commercial purposes  All illustrations and images included in CareNotes® are the copyrighted property of A D A M , Inc  or [facility] Marlyn Mcbride   The above information is an  only  It is not intended as medical advice for individual conditions or treatments  Talk to your doctor, nurse or pharmacist before following any medical regimen to see if it is safe and effective for you  Fco-Cardona Drain Care   WHAT YOU NEED TO KNOW:   A Fco-Cardona (MAXWELL) drain is used to remove fluids that build up in an area of your body after surgery  The MAXWELL drain is a bulb shaped device connected to a tube  One end of the tube is placed inside you during surgery  The other end comes out through a small cut in your skin  The bulb is connected to this end  You may have a stitch to hold the tube in place  DISCHARGE INSTRUCTIONS:   Seek care immediately if:   · Your MAXWELL drain breaks or comes out  · You have cloudy yellow or brown drainage from your MAXWELL drain site, or the drainage smells bad  Contact your healthcare provider if:   · You drain less than 30 milliliters (2 tablespoons) in 24 hours  This may mean your drain can be removed      · You suddenly stop draining fluid or think your MAXWELL drain is blocked  · You have a fever higher than 101 5°F (38 6°C)  · You have increased pain, redness, or swelling around the drain site  · You have questions about your MAXWELL drain care  How a Fco-Cardona drain works: The MAXWELL drain removes fluids by creating suction in the tube  The bulb is squeezed flat and connected to the tube that sticks out of your body  The bulb expands as it fills with fluid  How to change the bandage around your Fco-Cardona drain:  If you have a bandage, change it once a day  You may need to change your bandage more than once a day if it gets completely wet  · Wash your hands with soap and water  · Loosen the tape and gently remove the old bandage  Throw the old bandage into a plastic trash bag  · Use soap and water or saline (salt water) solution to clean your MAXWELL drain site  Dip a cotton swab or gauze pad in the solution and gently clean your skin  · Pat the area dry  · Place a new bandage on your MAXWELL drain site and secure it to your skin with medical tape  · Wash your hands  How to empty the Fco-Cardona drain:  Empty the bulb when it is half full or every 8 to 12 hours  · Wash your hands with soap and water  · Remove the plug from the bulb  · Pour the fluid into a measuring cup  · Clean the plug with an alcohol swab or a cotton ball dipped in rubbing alcohol  · Squeeze the bulb flat and put the plug back in  The bulb should stay flat until it starts to fill with fluid again  · Measure the amount of fluid you pour out  Write down how much fluid you empty from the MAXWELL drain and the date and time you collected it  · Flush the fluid down the toilet  Wash your hands  Clear clogged tubing: Use the following steps to clear your Fco-Cardona tubing:  · Hold the tubing between your thumb and first finger at the place closest to your skin  This hand will prevent the tube from being pulled out of your skin       · Use your other thumb and first finger to slide the clog down the tubing toward the bulb  You may have to repeat the sliding until the tubing is unclogged  Fco-Cardona drain removal:  The amount of fluid that you drain will decrease as your wound heals  The MAXWELL drain usually is removed when less than 30 milliliters (2 tablespoons) is collected in 24 hours  Ask your healthcare provider when and how your MAXWELL drain will be removed  Follow up with your healthcare provider as directed:  Write down your questions so you remember to ask them during your visits  © Copyright [address] [place] [address] Information is for End User's use only and may not be sold, redistributed or otherwise used for commercial purposes  All illustrations and images included in CareNotes® are the copyrighted property of [employer]  or Formerly named [facility] Marlyn Mcbride   The above information is an  only  It is not intended as medical advice for individual conditions or treatments  Talk to your doctor, nurse or pharmacist before following any medical regimen to see if it is safe and effective for you

## 2021-06-09 NOTE — TELEPHONE ENCOUNTER
The following message has been sent to our clinical team:    Patient is status post radical prostatectomy, has developed a leak, will be going home with his drain  Patient needs to have his catheter x2 weeks    Please arrange for follow-up with me in either the [facility] or [facility] office in 1 week to review drain outputs, I will decide upon need to cystogram or not at that visit

## 2021-06-09 NOTE — CASE MANAGEMENT
Cm met with pt and wife to review planning for DC  Pt will be going home with drains and candelario  CM offered to arrange SN for pt at home  Pt and wife state they would like this as they are unsure how to care for these  Pt and wife do not have a preference for an agency  [facility] referrals have been made however pt has [facility]  Cm informed pt and wife there may be a concern as many agencies are not able to accept new pt's as agency census' have been limited  Cm reviewed with pt and wife cm is not able to find VNA services due to pt's insurance  Wife states she believes she is able to manage without the nurse as long as she is taught here how to care for the drain and candelario  Nursing to teach wife and pt prior to DC  Wife will be transporting pt home at DC

## 2021-06-09 NOTE — TELEPHONE ENCOUNTER
Spoke to patients wife, Rahul, via number provided  Communications consent checked  Made her aware patients disability paperwork is finished and ready to be picked up  Verbalized she will be in

## 2021-06-09 NOTE — DISCHARGE SUMMARY
DISCHARGE SUMMARY    Patient Name: Tessa Butterfield February  Patient MRN: [id number]  Admitting Provider: Kary Raymond MD  Discharging Provider: Kary Raymond MD  Primary Care Physician at Discharge: James Wiggins [phone]   Admission Date: 6/7/2021       Discharge Date: 06/09/21      Admission Diagnosis   Prostate cancer [facility]) [facility] [facility]    Discharge Diagnoses  Patient Active Problem List   Diagnosis    Elevated PSA, less than 10 ng/ml    Prostate cancer [facility])         Hospital Course  Patient known to group for office evaluation regarding elevated PSA  Patient underwent prostate biopsy which was demonstrative for  Adenocarcinoma of the Prostate  After explanation of Risks vs  Benefits and potential complications; patient was agreeable and furnished informed consent for radical prostatectomy and bilateral lymph node dissection  Refer to operative report for details  Patient remained afebrile, hemodynamically stable throughout hospital stay  He developed severe abdominal pain overnight the evening of postoperative day 0  MAXWELL output was monitored and determine to be higher volume  MAXWELL creatinine obtained consistent with urine  Calixto catheter placed on traction  Patient remained overnight postoperative day 1 for continued monitoring  Diet tolerated and patient out of bed  By postoperative day 2, pain was well controlled and patient was ambulating more aggressively  Patient was passing gas and reported significant improvement  He was deemed  stable for discharge with both Calixto catheter and MAXWELL drain to straight drainage  Consult placed to case management for VNA services  Nursing staff performed basic Calixto catheter and MAXWELL drain teaching  Patient will be seen in the office by Dr Deb Schuler in follow-up 6/17    Patient/spouse expressed understanding of all discharge instructions including:  Medications, diet, activity limitations and restrictions, basics of drain care, signs and symptoms to report and follow-up      Medications  Current Discharge Medication List      CONTINUE these medications which have NOT CHANGED    Details   amLODIPine (NORVASC) 5 mg tablet Take 5 mg by mouth daily in the early morning       fluticasone (FLONASE) 50 mcg/act nasal spray fluticasone propionate 50 mcg/actuation nasal spray,suspension   one puff each nostril daily prn       hydrochlorothiazide (HYDRODIURIL) 25 mg tablet Take 25 mg by mouth daily in the early morning       losartan (COZAAR) 100 MG tablet Take 100 mg by mouth daily in the early morning       multivitamin (THERAGRAN) TABS Take 1 tablet by mouth daily           Current Discharge Medication List      START taking these medications    Details   docusate sodium (COLACE) 100 mg capsule Take 1 capsule (100 mg total) by mouth 3 (three) times a day for 14 days  Qty: 42 capsule, Refills: 0    Associated Diagnoses: Prostate cancer (HCC)      oxyCODONE (ROXICODONE) 5 mg immediate release tablet Take 1 tablet (5 mg total) by mouth every 4 (four) hours as needed for moderate pain for up to 5 daysMax Daily Amount: 30 mg  Qty: 10 tablet, Refills: 0    Associated Diagnoses: Prostate cancer (HCC)      polyethylene glycol (MIRALAX) 17 g packet Take 17 g by mouth daily for 7 days  Qty: 119 g, Refills: 0    Associated Diagnoses: Prostate cancer ([address] )               Allergies  No Known Allergies    Outpatient Follow-Up  Future Appointments   Date Time Provider Werner Haro   6/17/2021  9:00 AM Magali Vargas MD [facility]       Active Issues Requiring Follow-Up  Calixto catheter in MAXWELL drain monitoring, reassessment and eventual removal     Test Results Pending at Discharge  Pending Labs     Order Current Status    Tissue Exam In process        Referrals and Follow-ups to Schedule     Ambulatory Referral to [facility] Name: Other (please enter name in comment)    Disciplines Requested: Nursing    Follow-up Provider: PCP    [address] Needed:  Evaluate Functional Status and Safety  Urinary Incontinence Catheter Management  Post-Op Care and Assessment  Other (comment)       Homebound Criteria: Immunosuppressed    Supporting Clinical Findings: Limited Endurance          Discharge Disposition  Home     Treatments    Calixto catheter    MAXWELL drain--discharge with drain in-situ      Consults   none    Procedures   percutaneous drainage catheter placement--in place at time of discharge    Operative Procedures Performed  Procedure(s):  ROBOTIC LAPAROSCOPIC RADICAL PROSTATECTOMY AND PELVIC LYMPH NODE DISSECTION    Pertinent Test Results   labs: See below  Pathology Report Pending    Lab Results   Component Value Date    WBC 10 37 (H) 06/08/2021    HGB 12 1 06/08/2021    HCT 36 5 06/08/2021     (H) 06/08/2021     06/08/2021     Lab Results   Component Value Date    SODIUM 141 06/08/2021    K 3 1 (L) 06/08/2021     06/08/2021    CO2 28 06/08/2021    BUN 14 06/08/2021    CREATININE 1 64 (H) 06/08/2021    GLUC 132 06/08/2021    CALCIUM 8 8 06/08/2021         Physical Exam at Discharge  Condition of Patient on Discharge: good  BP (!) 177/80 (BP Location: Left arm)   Pulse 87   Temp 98 4 °F (36 9 °C) (Oral)   Resp 18   Ht 5' 9" (1 753 m)   Wt 96 5 kg (212 lb 11 9 oz)   SpO2 93%   BMI 31 42 kg/m²   General appearance: alert and oriented, in no acute distress, appears stated age, cooperative and no distress  Head: Normocephalic, without obvious abnormality, atraumatic  Neck: no adenopathy, no carotid bruit, no JVD, supple, symmetrical, trachea midline and thyroid not enlarged, symmetric, no tenderness/mass/nodules  Lungs: diminished breath sounds  Heart: regular rate and rhythm, S1, S2 normal, no murmur, click, rub or gallop  Abdomen: abnormal findings:  mild tenderness in the lower abdomen  Extremities: extremities normal, warm and well-perfused; no cyanosis, clubbing, or edema  Pulses: 2+ and symmetric  Neurologic: Grossly normal Abdominal puncture sites--clean dry and intact, Dermabond without evidence of dehiscence  MAXWELL and Calixto in-situ--yellow/orange tinge  I/O last 3 completed shifts: In: 4991 8 [I V :4991 8]  Out: 0682 [WXXEZ:7898; Drains:1080]  I/O this shift:   In: 950 [I V :950]  Out: 110 [facility]

## 2021-06-09 NOTE — UTILIZATION REVIEW
Inpatient Admission Authorization Request   NOTIFICATION OF INPATIENT ADMISSION/INPATIENT AUTHORIZATION REQUEST   SERVICING FACILITY:   [address]  Tax ID: [id number]  NPI: [id number]  Place of Service: Inpatient [facility]  [address]  Place of Service Code: 24     ATTENDING PROVIDER:  Attending Name and NPI#: Adolfo Elva [place] [[id number]]  Address: [place] [address]  Phone: [phone]     UTILIZATION REVIEW CONTACT:  [facility] Utilization   Network Utilization Review Department  Phone: [phone]  Fax: [phone]  Email: [email or web address]     PHYSICIAN ADVISORY SERVICES:  FOR BXMT-RI-BRWT REVIEW - MEDICAL NECESSITY DENIAL  Phone: [phone]  Fax: [phone]  Email: [email or web address]     TYPE OF REQUEST:  Inpatient Status     ADMISSION INFORMATION:  ADMISSION DATE/TIME: 6/8/21 1805  PATIENT DIAGNOSIS CODE/DESCRIPTION:  Prostate cancer ([address] ) [place]  DISCHARGE DATE/TIME: No discharge date for patient encounter  DISCHARGE DISPOSITION (IF DISCHARGED): Final discharge disposition not confirmed     IMPORTANT INFORMATION:  Please contact the [facility] directly with any questions or concerns regarding this request  Department voicemails are confidential     Send requests for admission clinical reviews, concurrent reviews, approvals, and administrative denials due to lack of clinical to fax [phone]

## 2021-06-09 NOTE — PROGRESS NOTES
Education provided on drain, incision, and candelario care to the wife as well as patient  Wife verbalized understanding  Made aware that she can contact the hospital or urology office with any questions or concerns

## 2021-06-10 LAB
ABO GROUP BLD BPU: NORMAL
ABO GROUP BLD BPU: NORMAL
BPU ID: NORMAL
BPU ID: NORMAL
CROSSMATCH: NORMAL
CROSSMATCH: NORMAL
UNIT DISPENSE STATUS: NORMAL
UNIT DISPENSE STATUS: NORMAL
UNIT PRODUCT CODE: NORMAL
UNIT PRODUCT CODE: NORMAL
UNIT RH: NORMAL
UNIT RH: NORMAL

## 2021-06-10 NOTE — TELEPHONE ENCOUNTER
Post Op Note    [place] February is a 63 y o  male s/p prostatectomy performed 6/7/2021 [place] February is a patient of Dr Dre Batista and is seen at the [facility] office  How would you rate your pain on a scale from 1 to 10, 10 being the worst pain ever? 0  Have you had a fever? Yes  If so, what was your highest temperature? 100 7 F What is your current temperature? 99 6  Have your bowel movements been regular? No patient will take colace and miralax  If the patient has an incision- do you have any redness around the incision or any drainage from the incision? No  If the patient has a drain- are you having any issues with the drain? No  If the patient has a candelario- are you comfortable caring for your candelario? Yes Is it draining urine? Yes  Do you have any other questions or concerns that I can address at this time? No     Discussed with patient no heavy lifting, straining, pushing,pulling    Advised patient no driving until catheter removed   Advised patient     Confirmed patients appt with Dr Jacqueline Paz patient to call office with any questions or concerns

## 2021-06-11 ENCOUNTER — TELEPHONE (OUTPATIENT)
Dept: UROLOGY | Facility: CLINIC | Age: 56
End: 2021-06-11

## 2021-06-11 NOTE — TELEPHONE ENCOUNTER
Called and spoke to patient's wife  Patient's wife stated that the patient has a clear bandage over the drain coming out of his side  Patient read off discharge paperwork that it was Tegaderm  Explained to wife that we do not have that in the office but we can supply with gauze if needed  Explained that Tegaderm can be bought over the counter if necessary  She also stated that in the hospital they cleaned his candelario catheter with specail wipes  Explained to her that we do not carry those wipes here in the office but they can clean around the catheter with warm water  She understood

## 2021-06-11 NOTE — TELEPHONE ENCOUNTER
Patient called in stating his son dropped off another form to be filled out 6/10/21 and patient would like to know if paperwork is completed and ready to be picked up  Also patient asked if he needs bandages frm surgery if he can pick them up at the office    Patient can be reached at [phone]

## 2021-06-12 ENCOUNTER — HOSPITAL ENCOUNTER (OUTPATIENT)
Facility: HOSPITAL | Age: 56
Setting detail: OBSERVATION
Discharge: HOME | End: 2021-06-14
Attending: EMERGENCY MEDICINE | Admitting: INTERNAL MEDICINE
Payer: COMMERCIAL

## 2021-06-12 ENCOUNTER — APPOINTMENT (EMERGENCY)
Dept: CT IMAGING | Facility: HOSPITAL | Age: 56
End: 2021-06-12
Payer: COMMERCIAL

## 2021-06-12 DIAGNOSIS — D64.9 ANEMIA: ICD-10-CM

## 2021-06-12 DIAGNOSIS — N99.89 POSTOPERATIVE SURGICAL COMPLICATION INVOLVING GENITOURINARY SYSTEM ASSOCIATED WITH GENITOURINARY PROCEDURE, UNSPECIFIED COMPLICATION: ICD-10-CM

## 2021-06-12 DIAGNOSIS — N39.0 UTI (URINARY TRACT INFECTION): Primary | ICD-10-CM

## 2021-06-12 DIAGNOSIS — C61 PROSTATE CANCER (HCC): ICD-10-CM

## 2021-06-12 DIAGNOSIS — E87.6 HYPOKALEMIA: ICD-10-CM

## 2021-06-12 DIAGNOSIS — N28.9 RENAL INSUFFICIENCY: ICD-10-CM

## 2021-06-12 PROBLEM — N30.01 ACUTE CYSTITIS WITH HEMATURIA: Status: ACTIVE | Noted: 2021-06-12

## 2021-06-12 PROBLEM — F10.20 ETOH DEPENDENCE (HCC): Status: ACTIVE | Noted: 2021-06-12

## 2021-06-12 PROBLEM — D62 ACUTE BLOOD LOSS ANEMIA: Status: ACTIVE | Noted: 2021-06-12

## 2021-06-12 PROBLEM — T81.9XXA POST-OPERATIVE COMPLICATION: Status: ACTIVE | Noted: 2021-06-12

## 2021-06-12 PROBLEM — I10 ESSENTIAL HYPERTENSION: Status: ACTIVE | Noted: 2021-06-12

## 2021-06-12 LAB
ALBUMIN SERPL BCP-MCNC: 2.9 G/DL (ref 3.5–5)
ALP SERPL-CCNC: 78 U/L (ref 46–116)
ALT SERPL W P-5'-P-CCNC: 81 U/L (ref 12–78)
ANION GAP SERPL CALCULATED.3IONS-SCNC: 3 MMOL/L (ref 4–13)
APTT PPP: 32 SECONDS (ref 23–37)
AST SERPL W P-5'-P-CCNC: 65 U/L (ref 5–45)
BACTERIA UR QL AUTO: ABNORMAL /HPF
BASOPHILS # BLD AUTO: 0.01 THOUSANDS/ΜL (ref 0–0.1)
BASOPHILS NFR BLD AUTO: 0 % (ref 0–1)
BILIRUB SERPL-MCNC: 0.7 MG/DL (ref 0.2–1)
BILIRUB UR QL STRIP: NEGATIVE
BUN SERPL-MCNC: 13 MG/DL (ref 5–25)
CALCIUM ALBUM COR SERPL-MCNC: 9.6 MG/DL (ref 8.3–10.1)
CALCIUM SERPL-MCNC: 8.7 MG/DL (ref 8.3–10.1)
CHLORIDE SERPL-SCNC: 100 MMOL/L (ref 100–108)
CLARITY UR: ABNORMAL
CO2 SERPL-SCNC: 34 MMOL/L (ref 21–32)
COLOR UR: ABNORMAL
CREAT SERPL-MCNC: 1.37 MG/DL (ref 0.6–1.3)
EOSINOPHIL # BLD AUTO: 0.2 THOUSAND/ΜL (ref 0–0.61)
EOSINOPHIL NFR BLD AUTO: 3 % (ref 0–6)
ERYTHROCYTE [DISTWIDTH] IN BLOOD BY AUTOMATED COUNT: 11.8 % (ref 11.6–15.1)
GFR SERPL CREATININE-BSD FRML MDRD: 66 ML/MIN/1.73SQ M
GLUCOSE SERPL-MCNC: 113 MG/DL (ref 65–140)
GLUCOSE UR STRIP-MCNC: NEGATIVE MG/DL
HCT VFR BLD AUTO: 30.1 % (ref 36.5–49.3)
HGB BLD-MCNC: 9.7 G/DL (ref 12–17)
HGB UR QL STRIP.AUTO: ABNORMAL
IMM GRANULOCYTES # BLD AUTO: 0.03 THOUSAND/UL (ref 0–0.2)
IMM GRANULOCYTES NFR BLD AUTO: 1 % (ref 0–2)
INR PPP: 0.99 (ref 0.84–1.19)
KETONES UR STRIP-MCNC: NEGATIVE MG/DL
LACTATE SERPL-SCNC: 0.9 MMOL/L (ref 0.5–2)
LEUKOCYTE ESTERASE UR QL STRIP: ABNORMAL
LYMPHOCYTES # BLD AUTO: 1.94 THOUSANDS/ΜL (ref 0.6–4.47)
LYMPHOCYTES NFR BLD AUTO: 31 % (ref 14–44)
MCH RBC QN AUTO: 32.4 PG (ref 26.8–34.3)
MCHC RBC AUTO-ENTMCNC: 32.2 G/DL (ref 31.4–37.4)
MCV RBC AUTO: 101 FL (ref 82–98)
MONOCYTES # BLD AUTO: 0.72 THOUSAND/ΜL (ref 0.17–1.22)
MONOCYTES NFR BLD AUTO: 11 % (ref 4–12)
NEUTROPHILS # BLD AUTO: 3.41 THOUSANDS/ΜL (ref 1.85–7.62)
NEUTS SEG NFR BLD AUTO: 54 % (ref 43–75)
NITRITE UR QL STRIP: NEGATIVE
NON-SQ EPI CELLS URNS QL MICRO: ABNORMAL /HPF
NRBC BLD AUTO-RTO: 0 /100 WBCS
PH UR STRIP.AUTO: 6.5 [PH]
PLATELET # BLD AUTO: 211 THOUSANDS/UL (ref 149–390)
PMV BLD AUTO: 10.6 FL (ref 8.9–12.7)
POTASSIUM SERPL-SCNC: 3.3 MMOL/L (ref 3.5–5.3)
PROCALCITONIN SERPL-MCNC: 0.15 NG/ML
PROT SERPL-MCNC: 7.4 G/DL (ref 6.4–8.2)
PROT UR STRIP-MCNC: ABNORMAL MG/DL
PROTHROMBIN TIME: 12.6 SECONDS (ref 11.6–14.5)
RBC # BLD AUTO: 2.99 MILLION/UL (ref 3.88–5.62)
RBC #/AREA URNS AUTO: ABNORMAL /HPF
SODIUM SERPL-SCNC: 137 MMOL/L (ref 136–145)
SP GR UR STRIP.AUTO: 1.01 (ref 1–1.03)
UROBILINOGEN UR QL STRIP.AUTO: 1 E.U./DL
WBC # BLD AUTO: 6.31 THOUSAND/UL (ref 4.31–10.16)
WBC #/AREA URNS AUTO: ABNORMAL /HPF

## 2021-06-12 PROCEDURE — 74177 CT ABD & PELVIS W/CONTRAST: CPT

## 2021-06-12 PROCEDURE — 85730 THROMBOPLASTIN TIME PARTIAL: CPT | Performed by: EMERGENCY MEDICINE

## 2021-06-12 PROCEDURE — 99285 EMERGENCY DEPT VISIT HI MDM: CPT

## 2021-06-12 PROCEDURE — 36415 COLL VENOUS BLD VENIPUNCTURE: CPT | Performed by: EMERGENCY MEDICINE

## 2021-06-12 PROCEDURE — 99220 PR INITIAL OBSERVATION CARE/DAY 70 MINUTES: CPT | Performed by: PHYSICIAN ASSISTANT

## 2021-06-12 PROCEDURE — 99285 EMERGENCY DEPT VISIT HI MDM: CPT | Performed by: EMERGENCY MEDICINE

## 2021-06-12 PROCEDURE — 81001 URINALYSIS AUTO W/SCOPE: CPT | Performed by: EMERGENCY MEDICINE

## 2021-06-12 PROCEDURE — 87040 BLOOD CULTURE FOR BACTERIA: CPT | Performed by: EMERGENCY MEDICINE

## 2021-06-12 PROCEDURE — 80053 COMPREHEN METABOLIC PANEL: CPT | Performed by: EMERGENCY MEDICINE

## 2021-06-12 PROCEDURE — 84145 PROCALCITONIN (PCT): CPT | Performed by: EMERGENCY MEDICINE

## 2021-06-12 PROCEDURE — 83605 ASSAY OF LACTIC ACID: CPT | Performed by: EMERGENCY MEDICINE

## 2021-06-12 PROCEDURE — 93005 ELECTROCARDIOGRAM TRACING: CPT

## 2021-06-12 PROCEDURE — G1004 CDSM NDSC: HCPCS

## 2021-06-12 PROCEDURE — 85025 COMPLETE CBC W/AUTO DIFF WBC: CPT | Performed by: EMERGENCY MEDICINE

## 2021-06-12 PROCEDURE — 96360 HYDRATION IV INFUSION INIT: CPT

## 2021-06-12 PROCEDURE — 85610 PROTHROMBIN TIME: CPT | Performed by: EMERGENCY MEDICINE

## 2021-06-12 RX ORDER — AMLODIPINE BESYLATE 5 MG/1
5 TABLET ORAL
Status: DISCONTINUED | OUTPATIENT
Start: 2021-06-13 | End: 2021-06-14 | Stop reason: HOSPADM

## 2021-06-12 RX ORDER — POTASSIUM CHLORIDE 20 MEQ/1
40 TABLET, EXTENDED RELEASE ORAL ONCE
Status: COMPLETED | OUTPATIENT
Start: 2021-06-12 | End: 2021-06-13

## 2021-06-12 RX ORDER — OXYCODONE HYDROCHLORIDE 5 MG/1
5 TABLET ORAL EVERY 4 HOURS PRN
Status: DISCONTINUED | OUTPATIENT
Start: 2021-06-12 | End: 2021-06-14 | Stop reason: HOSPADM

## 2021-06-12 RX ORDER — FOLIC ACID 1 MG/1
1 TABLET ORAL DAILY
Status: DISCONTINUED | OUTPATIENT
Start: 2021-06-13 | End: 2021-06-14 | Stop reason: HOSPADM

## 2021-06-12 RX ORDER — LANOLIN ALCOHOL/MO/W.PET/CERES
6 CREAM (GRAM) TOPICAL
Status: DISCONTINUED | OUTPATIENT
Start: 2021-06-13 | End: 2021-06-14 | Stop reason: HOSPADM

## 2021-06-12 RX ORDER — POLYETHYLENE GLYCOL 3350 17 G/17G
17 POWDER, FOR SOLUTION ORAL DAILY
Status: DISCONTINUED | OUTPATIENT
Start: 2021-06-13 | End: 2021-06-14 | Stop reason: HOSPADM

## 2021-06-12 RX ORDER — ONDANSETRON 2 MG/ML
4 INJECTION INTRAMUSCULAR; INTRAVENOUS EVERY 6 HOURS PRN
Status: DISCONTINUED | OUTPATIENT
Start: 2021-06-12 | End: 2021-06-14 | Stop reason: HOSPADM

## 2021-06-12 RX ORDER — FLUTICASONE PROPIONATE 50 MCG
1 SPRAY, SUSPENSION (ML) NASAL DAILY PRN
Status: DISCONTINUED | OUTPATIENT
Start: 2021-06-12 | End: 2021-06-14 | Stop reason: HOSPADM

## 2021-06-12 RX ORDER — LOSARTAN POTASSIUM 50 MG/1
100 TABLET ORAL
Status: DISCONTINUED | OUTPATIENT
Start: 2021-06-13 | End: 2021-06-14 | Stop reason: HOSPADM

## 2021-06-12 RX ORDER — LANOLIN ALCOHOL/MO/W.PET/CERES
100 CREAM (GRAM) TOPICAL DAILY
Status: DISCONTINUED | OUTPATIENT
Start: 2021-06-13 | End: 2021-06-14 | Stop reason: HOSPADM

## 2021-06-12 RX ORDER — HYDROCHLOROTHIAZIDE 25 MG/1
25 TABLET ORAL
Status: DISCONTINUED | OUTPATIENT
Start: 2021-06-13 | End: 2021-06-14 | Stop reason: HOSPADM

## 2021-06-12 RX ORDER — DOCUSATE SODIUM 100 MG/1
100 CAPSULE, LIQUID FILLED ORAL 2 TIMES DAILY
Status: DISCONTINUED | OUTPATIENT
Start: 2021-06-13 | End: 2021-06-14 | Stop reason: HOSPADM

## 2021-06-12 RX ORDER — ACETAMINOPHEN 325 MG/1
975 TABLET ORAL EVERY 6 HOURS PRN
Status: DISCONTINUED | OUTPATIENT
Start: 2021-06-12 | End: 2021-06-14 | Stop reason: HOSPADM

## 2021-06-12 RX ORDER — MAGNESIUM HYDROXIDE/ALUMINUM HYDROXICE/SIMETHICONE 120; 1200; 1200 MG/30ML; MG/30ML; MG/30ML
30 SUSPENSION ORAL EVERY 6 HOURS PRN
Status: DISCONTINUED | OUTPATIENT
Start: 2021-06-12 | End: 2021-06-14 | Stop reason: HOSPADM

## 2021-06-12 RX ADMIN — CEFTRIAXONE SODIUM 1000 MG: 10 INJECTION, POWDER, FOR SOLUTION INTRAVENOUS at 22:42

## 2021-06-12 RX ADMIN — IOHEXOL 100 ML: 350 INJECTION, SOLUTION INTRAVENOUS at 20:37

## 2021-06-12 RX ADMIN — SODIUM CHLORIDE 1000 ML: 0.9 INJECTION, SOLUTION INTRAVENOUS at 19:59

## 2021-06-12 NOTE — ED PROVIDER NOTES
History  Chief Complaint   Patient presents with    Post-op Problem     Patient reports had surgery on Monday and feels like he has a post op infection, patient reports low grade fevers [id number]     HPI patient is a 48-year-old male, patient is status post laparoscopic prostatectomy and pelvic no dissection discharged on June 9th  Patient reports today fever intermittently, temp of a 100 7° at times  Patient reports chills and feeling somewhat weak  Denies any abdominal pain  Reports some liquid drainage from around his left-sided Fco-Cardona drain  Denies any difficulty voiding  Denies any urinary retention  Denies any vomiting  Past medical history of hypertension, prostate cancer, prostatectomy with lymph node dissection  Family history noncontributory  Social history, former smoker no history of drug abuse    Prior to Admission Medications   Prescriptions Last Dose Informant Patient Reported? Taking?    amLODIPine (NORVASC) 5 mg tablet  Self Yes No   Sig: Take 5 mg by mouth daily in the early morning    docusate sodium (COLACE) 100 mg capsule   No No   Sig: Take 1 capsule (100 mg total) by mouth 3 (three) times a day for 14 days   fluticasone (FLONASE) 50 mcg/act nasal spray  Self Yes No   Sig: fluticasone propionate 50 mcg/actuation nasal spray,suspension   one puff each nostril daily prn    hydrochlorothiazide (HYDRODIURIL) 25 mg tablet  Self Yes No   Sig: Take 25 mg by mouth daily in the early morning    losartan (COZAAR) 100 MG tablet  Self Yes No   Sig: Take 100 mg by mouth daily in the early morning    multivitamin (THERAGRAN) TABS   Yes No   Sig: Take 1 tablet by mouth daily   oxyCODONE (ROXICODONE) 5 mg immediate release tablet   No No   Sig: Take 1 tablet (5 mg total) by mouth every 4 (four) hours as needed for moderate pain for up to 5 daysMax Daily Amount: 30 mg   polyethylene glycol (MIRALAX) 17 g packet   No No   Sig: Take 17 g by mouth daily for 7 days      Facility-Administered Medications: None       Past Medical History:   Diagnosis Date    Hypertension        Past Surgical History:   Procedure Laterality Date    COLONOSCOPY      TN LAP,PROSTATECTOMY,RADICAL,W/NERVE SPARE,INCL ROBOTIC N/A 2021    Procedure: ROBOTIC LAPAROSCOPIC RADICAL PROSTATECTOMY AND PELVIC LYMPH NODE DISSECTION;  Surgeon: Vernon Goltz, MD;  Location: [facility];  Service: Urology       Family History   Problem Relation Age of Onset    Hypertension Father      I have reviewed and agree with the history as documented  E-Cigarette/Vaping    E-Cigarette Use Never User      E-Cigarette/Vaping Substances     Social History     Tobacco Use    Smoking status: Former Smoker     Quit date:      Years since quittin 4    Smokeless tobacco: Never Used   Substance Use Topics    Alcohol use: Yes     Alcohol/week: 7 0 - 14 0 standard drinks     Types: 7 - 14 Shots of liquor per week     Frequency: 4 or more times a week     Drinks per session: 1 or 2     Binge frequency: Never     Comment: 1-2 shots of PIPPA nightly    Drug use: Not Currently       Review of Systems   Constitutional: Positive for chills and fever  Negative for diaphoresis and fatigue  HENT: Negative for congestion, ear pain, nosebleeds and sore throat  Eyes: Negative for photophobia, pain, discharge and visual disturbance  Respiratory: Negative for cough, choking, chest tightness, shortness of breath and wheezing  Cardiovascular: Negative for chest pain and palpitations  Gastrointestinal: Negative for abdominal distention, abdominal pain, diarrhea and vomiting  Genitourinary: Negative for dysuria, flank pain and frequency  Musculoskeletal: Negative for back pain, gait problem and joint swelling  Skin: Negative for color change and rash  Neurological: Negative for dizziness, syncope and headaches  Psychiatric/Behavioral: Negative for behavioral problems and confusion  The patient is not nervous/anxious      All other systems reviewed and are negative  Physical Exam  Physical Exam  Vitals signs and nursing note reviewed  Constitutional:       Appearance: He is well-developed  HENT:      Head: Normocephalic  Right Ear: External ear normal       Left Ear: External ear normal       Nose: Nose normal    Eyes:      General: Lids are normal       Pupils: Pupils are equal, round, and reactive to light  Neck:      Musculoskeletal: Normal range of motion and neck supple  Cardiovascular:      Rate and Rhythm: Normal rate and regular rhythm  Pulses: Normal pulses  Heart sounds: Normal heart sounds  Pulmonary:      Effort: Pulmonary effort is normal  No respiratory distress  Breath sounds: Normal breath sounds  Abdominal:      General: Abdomen is flat  Bowel sounds are normal       Tenderness: There is abdominal tenderness  Comments: Mild diffuse tenderness without rebound, there is some serous fluid draining from the left side of the patient's abdomen around the MAXWELL drain  Genitourinary:     Comments: Calixto catheter in place in the penis, some bloody urine  Musculoskeletal: Normal range of motion  General: No deformity  Skin:     General: Skin is warm and dry  Neurological:      Mental Status: He is alert and oriented to person, place, and time       Pulse oximetry 98% on room air adequate oxygenation, there is no hypoxia    Vital Signs  ED Triage Vitals [06/12/21 1914]   Temperature Pulse Respirations Blood Pressure SpO2   98 4 °F (36 9 °C) 63 18 135/65 98 %      Temp Source Heart Rate Source Patient Position - Orthostatic VS BP Location FiO2 (%)   Oral Monitor Sitting Left arm --      Pain Score       --           Vitals:    06/12/21 1914 06/12/21 2145   BP: 135/65 139/74   Pulse: 63 78   Patient Position - Orthostatic VS: Sitting Lying         Visual Acuity      ED Medications  Medications   iohexol (OMNIPAQUE) 350 MG/ML injection (MULTI-DOSE) 100 mL (has no administration in time range)   potassium chloride (K-DUR,KLOR-CON) CR tablet 40 mEq (has no administration in time range)   sodium chloride 0 9 % bolus 1,000 mL (0 mL Intravenous Stopped 6/12/21 2104)   iohexol (OMNIPAQUE) 350 MG/ML injection (MULTI-DOSE) 100 mL (100 mL Intravenous Given 6/12/21 2037)   ceftriaxone (ROCEPHIN) 1 g/50 mL in dextrose IVPB (1,000 mg Intravenous New Bag 6/12/21 2242)       Diagnostic Studies  Results Reviewed     Procedure Component Value Units Date/Time    Protime-INR [[id number]]  (Normal) Collected: 06/12/21 1956    Lab Status: Final result Specimen: Blood from Arm, Right Updated: 06/12/21 2029     Protime 12 6 seconds      INR 0 99    APTT [[id number]]  (Normal) Collected: 06/12/21 1956    Lab Status: Final result Specimen: Blood from Arm, Right Updated: 06/12/21 2029     PTT 32 seconds     Lactic acid [[id number]]  (Normal) Collected: 06/12/21 1956    Lab Status: Final result Specimen: Blood from Arm, Right Updated: 06/12/21 2024     LACTIC ACID 0 9 mmol/L     Narrative:      Result may be elevated if tourniquet was used during collection  Blood culture #1 [[id number]] Collected: 06/12/21 2015    Lab Status:  In process Specimen: Blood from Arm, Left Updated: 06/12/21 2022    Comprehensive metabolic panel [[id number]]  (Abnormal) Collected: 06/12/21 1956    Lab Status: Final result Specimen: Blood from Arm, Right Updated: 06/12/21 2021     Sodium 137 mmol/L      Potassium 3 3 mmol/L      Chloride 100 mmol/L      CO2 34 mmol/L      ANION GAP 3 mmol/L      BUN 13 mg/dL      Creatinine 1 37 mg/dL      Glucose 113 mg/dL      Calcium 8 7 mg/dL      Corrected Calcium 9 6 mg/dL      AST 65 U/L      ALT 81 U/L      Alkaline Phosphatase 78 U/L      Total Protein 7 4 g/dL      Albumin 2 9 g/dL      Total Bilirubin 0 70 mg/dL      eGFR 66 ml/min/1 73sq m     Narrative:      [place] guidelines for Chronic Kidney Disease (CKD):     Stage 1 with normal or high GFR (GFR > 90 mL/min/1 73 square meters)    Stage 2 Mild CKD (GFR = 60-89 mL/min/1 73 square meters)    Stage 3A Moderate CKD (GFR = 45-59 mL/min/1 73 square meters)    Stage 3B Moderate CKD (GFR = 30-44 mL/min/1 73 square meters)    Stage 4 Severe CKD (GFR = 15-29 mL/min/1 73 square meters)    Stage 5 End Stage CKD (GFR <15 mL/min/1 73 square meters)  Note: GFR calculation is accurate only with a steady state creatinine    Urine Microscopic [[id number]]  (Abnormal) Collected: 06/12/21 1958    Lab Status: Final result Specimen: Urine, Indwelling Calixto Catheter Updated: 06/12/21 2019     RBC, UA Innumerable /hpf      WBC, UA 4-10 /hpf      Epithelial Cells None Seen /hpf      Bacteria, UA None Seen /hpf     UA w Reflex to Microscopic w Reflex to Culture [[id number]]  (Abnormal) Collected: 06/12/21 1958    Lab Status: Final result Specimen: Urine, Indwelling Calixto Catheter Updated: 06/12/21 2005     Color, UA [place]     Clarity, UA Slightly Cloudy     Specific Gravity, UA 1 015     pH, UA 6 5     Leukocytes, UA Small     Nitrite, UA Negative     Protein, UA 30 (1+) mg/dl      Glucose, UA Negative mg/dl      Ketones, UA Negative mg/dl      Urobilinogen, UA 1 0 E U /dl      Bilirubin, UA Negative     Blood, UA Large    CBC and differential [[id number]]  (Abnormal) Collected: 06/12/21 1956    Lab Status: Final result Specimen: Blood from Arm, Right Updated: 06/12/21 2004     WBC 6 31 Thousand/uL      RBC 2 99 Million/uL      Hemoglobin 9 7 g/dL      Hematocrit 30 1 %       fL      MCH 32 4 pg      MCHC 32 2 g/dL      RDW 11 8 %      MPV 10 6 fL      Platelets 463 Thousands/uL      nRBC 0 /100 WBCs      Neutrophils Relative 54 %      Immat GRANS % 1 %      Lymphocytes Relative 31 %      Monocytes Relative 11 %      Eosinophils Relative 3 %      Basophils Relative 0 %      Neutrophils Absolute 3 41 Thousands/µL      Immature Grans Absolute 0 03 Thousand/uL      Lymphocytes Absolute 1 94 Thousands/µL      Monocytes Absolute 0 72 Thousand/µL Eosinophils Absolute 0 20 Thousand/µL      Basophils Absolute 0 01 Thousands/µL     Procalcitonin with AM Reflex [[id number]] Collected: 06/12/21 1956    Lab Status: In process Specimen: Blood from Arm, Right Updated: 06/12/21 2001    Blood culture #2 [[id number]] Collected: 06/12/21 1956    Lab Status: In process Specimen: Blood from Arm, Right Updated: 06/12/21 2001                 CT abdomen pelvis with contrast   Final Result by Cheng Coppola DO (06/12 2141)      Status post prostatectomy with significant inflammatory changes and free fluid in the pelvis  Findings may represent postsurgical changes however postsurgical complication such as infection or other etiology cannot be ruled out  Thickening of the urinary bladder wall which may represent cystitis      The study was marked in EPIC for immediate notification  Workstation performed: [id number]                    Procedures  ECG 12 Lead Documentation Only    Date/Time: 6/12/2021 8:39 PM  Performed by: Marge Pearce MD  Authorized by: Marge Pearce MD     Indications / Diagnosis:  Fever  ECG reviewed by me, the ED Provider: yes    Patient location:  ED  Previous ECG:     Previous ECG:  Compared to current    Comparison ECG info: May 19, 2021    Similarity:  Changes noted  Interpretation:     Interpretation: non-specific    Rate:     ECG rate:  Sixty-five    ECG rate assessment: normal    Rhythm:     Rhythm: sinus rhythm    Comments:      Normal sinus rhythm nonspecific ST-T wave changes             ED Course            urinalysis showed hematuria   Electrolytes within normal limits   Lactic acid was normal no sign of severe sepsis  white count was normal at 6 3 no sign of inflammation, hemoglobin was reduced at 9 7 consistent with anemia  SBIRT 20yo+      Most Recent Value   SBIRT (24 yo +)   In order to provide better care to our patients, we are screening all of our patients for alcohol and drug use   Would it be okay to ask you these screening questions? Yes Filed at: 06/12/2021 2156   Initial Alcohol Screen: US AUDIT-C    1  How often do you have a drink containing alcohol?  0 Filed at: 06/12/2021 2156   2  How many drinks containing alcohol do you have on a typical day you are drinking? 0 Filed at: 06/12/2021 2156   3a  Male UNDER 65: How often do you have five or more drinks on one occasion? 0 Filed at: 06/12/2021 2156   3b  FEMALE Any Age, or MALE 65+: How often do you have 4 or more drinks on one occassion? 0 Filed at: 06/12/2021 2156   Audit-C Score  0 Filed at: 06/12/2021 2156   ANA: How many times in the past year have you    Used an illegal drug or used a prescription medication for non-medical reasons? Never Filed at: 06/12/2021 2156          [place] Ng' Criteria for PE      Most Recent Value   Wells' Criteria for PE   Clinical signs and symptoms of DVT  0 Filed at: 06/12/2021 1953   PE is primary diagnosis or equally likely  0 Filed at: 06/12/2021 1953   HR >100  0 Filed at: 06/12/2021 1953   Immobilization at least 3 days or Surgery in the previous 4 weeks  1 5 Filed at: 06/12/2021 1953   Previous, objectively diagnosed PE or DVT  0 Filed at: 06/12/2021 1953   Hemoptysis  0 Filed at: 06/12/2021 1953   Malignancy with treatment within 6 months or palliative  0 Filed at: 06/12/2021 1953   Wells' Criteria Total  1 5 Filed at: 06/12/2021 1953          CT signed out to the oncoming provider  MDM medical decision making 59-year-old male status post prostatectomy presents emergency department with fever, discussed with Urology , patient was admitted to the hospitalist service      Disposition  Final diagnoses:   UTI (urinary tract infection)   Anemia   Hypokalemia   Renal insufficiency     Time reflects when diagnosis was documented in both MDM as applicable and the Disposition within this note     Time User Action Codes Description Comment    6/12/2021 10:29 PM Lisa Anderson Add [N39 0] UTI (urinary tract infection) 6/12/2021 10:29 PM Dulcy Gift Add [D64 9] Anemia     6/12/2021 10:29 PM Dulcy Gift Add [E87 6] Hypokalemia     6/12/2021 10:30 PM Dulcy Gift Add [N28 9] Renal insufficiency       ED Disposition     ED Disposition Condition Date/Time Comment    Admit Stable Sat Jun 12, 2021 10:26 PM Case was discussed with CHARLES and the patient's admission status was agreed to be Admission Status: observation status to the service of Dr Roxanne Espino   Follow-up Information    None         Patient's Medications   Discharge Prescriptions    No medications on file     No discharge procedures on file      PDMP Review       Value Time User    PDMP Reviewed  Yes 6/7/2021  7:30 AM Dang Rene MD          ED Provider  Electronically Signed by           Soraya Orozco MD  06/12/21 1098

## 2021-06-13 LAB
ALBUMIN SERPL BCP-MCNC: 2.6 G/DL (ref 3.5–5)
ALP SERPL-CCNC: 78 U/L (ref 46–116)
ALT SERPL W P-5'-P-CCNC: 86 U/L (ref 12–78)
ANION GAP SERPL CALCULATED.3IONS-SCNC: 9 MMOL/L (ref 4–13)
AST SERPL W P-5'-P-CCNC: 67 U/L (ref 5–45)
ATRIAL RATE: 65 BPM
BASOPHILS # BLD AUTO: 0.02 THOUSANDS/ΜL (ref 0–0.1)
BASOPHILS NFR BLD AUTO: 0 % (ref 0–1)
BILIRUB SERPL-MCNC: 0.61 MG/DL (ref 0.2–1)
BUN SERPL-MCNC: 12 MG/DL (ref 5–25)
CALCIUM ALBUM COR SERPL-MCNC: 9.6 MG/DL (ref 8.3–10.1)
CALCIUM SERPL-MCNC: 8.5 MG/DL (ref 8.3–10.1)
CHLORIDE SERPL-SCNC: 102 MMOL/L (ref 100–108)
CO2 SERPL-SCNC: 28 MMOL/L (ref 21–32)
CREAT SERPL-MCNC: 1.24 MG/DL (ref 0.6–1.3)
EOSINOPHIL # BLD AUTO: 0.21 THOUSAND/ΜL (ref 0–0.61)
EOSINOPHIL NFR BLD AUTO: 3 % (ref 0–6)
ERYTHROCYTE [DISTWIDTH] IN BLOOD BY AUTOMATED COUNT: 11.7 % (ref 11.6–15.1)
GFR SERPL CREATININE-BSD FRML MDRD: 75 ML/MIN/1.73SQ M
GLUCOSE P FAST SERPL-MCNC: 102 MG/DL (ref 65–99)
GLUCOSE SERPL-MCNC: 102 MG/DL (ref 65–140)
HCT VFR BLD AUTO: 30.1 % (ref 36.5–49.3)
HGB BLD-MCNC: 9.7 G/DL (ref 12–17)
IMM GRANULOCYTES # BLD AUTO: 0.02 THOUSAND/UL (ref 0–0.2)
IMM GRANULOCYTES NFR BLD AUTO: 0 % (ref 0–2)
LYMPHOCYTES # BLD AUTO: 2.03 THOUSANDS/ΜL (ref 0.6–4.47)
LYMPHOCYTES NFR BLD AUTO: 29 % (ref 14–44)
MAGNESIUM SERPL-MCNC: 1.9 MG/DL (ref 1.6–2.6)
MCH RBC QN AUTO: 32.1 PG (ref 26.8–34.3)
MCHC RBC AUTO-ENTMCNC: 32.2 G/DL (ref 31.4–37.4)
MCV RBC AUTO: 100 FL (ref 82–98)
MONOCYTES # BLD AUTO: 0.84 THOUSAND/ΜL (ref 0.17–1.22)
MONOCYTES NFR BLD AUTO: 12 % (ref 4–12)
NEUTROPHILS # BLD AUTO: 3.86 THOUSANDS/ΜL (ref 1.85–7.62)
NEUTS SEG NFR BLD AUTO: 56 % (ref 43–75)
NRBC BLD AUTO-RTO: 0 /100 WBCS
P AXIS: 65 DEGREES
PLATELET # BLD AUTO: 219 THOUSANDS/UL (ref 149–390)
PMV BLD AUTO: 11.1 FL (ref 8.9–12.7)
POTASSIUM SERPL-SCNC: 3.3 MMOL/L (ref 3.5–5.3)
PR INTERVAL: 242 MS
PROCALCITONIN SERPL-MCNC: 0.1 NG/ML
PROT SERPL-MCNC: 6.9 G/DL (ref 6.4–8.2)
QRS AXIS: 24 DEGREES
QRSD INTERVAL: 110 MS
QT INTERVAL: 414 MS
QTC INTERVAL: 430 MS
RBC # BLD AUTO: 3.02 MILLION/UL (ref 3.88–5.62)
SODIUM SERPL-SCNC: 139 MMOL/L (ref 136–145)
T WAVE AXIS: 57 DEGREES
VENTRICULAR RATE: 65 BPM
WBC # BLD AUTO: 6.98 THOUSAND/UL (ref 4.31–10.16)

## 2021-06-13 PROCEDURE — 99225 PR SBSQ OBSERVATION CARE/DAY 25 MINUTES: CPT | Performed by: INTERNAL MEDICINE

## 2021-06-13 PROCEDURE — 84145 PROCALCITONIN (PCT): CPT | Performed by: EMERGENCY MEDICINE

## 2021-06-13 PROCEDURE — 83735 ASSAY OF MAGNESIUM: CPT | Performed by: PHYSICIAN ASSISTANT

## 2021-06-13 PROCEDURE — 85025 COMPLETE CBC W/AUTO DIFF WBC: CPT | Performed by: PHYSICIAN ASSISTANT

## 2021-06-13 PROCEDURE — 80053 COMPREHEN METABOLIC PANEL: CPT | Performed by: PHYSICIAN ASSISTANT

## 2021-06-13 PROCEDURE — 93010 ELECTROCARDIOGRAM REPORT: CPT | Performed by: INTERNAL MEDICINE

## 2021-06-13 RX ORDER — POTASSIUM CHLORIDE 20 MEQ/1
40 TABLET, EXTENDED RELEASE ORAL ONCE
Status: COMPLETED | OUTPATIENT
Start: 2021-06-13 | End: 2021-06-13

## 2021-06-13 RX ADMIN — THIAMINE HCL TAB 100 MG 100 MG: 100 TAB at 08:59

## 2021-06-13 RX ADMIN — POTASSIUM CHLORIDE 40 MEQ: 1500 TABLET, EXTENDED RELEASE ORAL at 08:59

## 2021-06-13 RX ADMIN — HYDROCHLOROTHIAZIDE 25 MG: 25 TABLET ORAL at 06:34

## 2021-06-13 RX ADMIN — FOLIC ACID 1 MG: 1 TABLET ORAL at 08:59

## 2021-06-13 RX ADMIN — CEFTRIAXONE SODIUM 1000 MG: 10 INJECTION, POWDER, FOR SOLUTION INTRAVENOUS at 22:24

## 2021-06-13 RX ADMIN — LOSARTAN POTASSIUM 100 MG: 50 TABLET, FILM COATED ORAL at 06:35

## 2021-06-13 RX ADMIN — Medication 1 TABLET: at 08:59

## 2021-06-13 RX ADMIN — AMLODIPINE BESYLATE 5 MG: 5 TABLET ORAL at 06:35

## 2021-06-13 RX ADMIN — Medication 6 MG: at 02:54

## 2021-06-13 RX ADMIN — POTASSIUM CHLORIDE 40 MEQ: 1500 TABLET, EXTENDED RELEASE ORAL at 02:54

## 2021-06-13 RX ADMIN — DOCUSATE SODIUM 100 MG: 100 CAPSULE ORAL at 22:24

## 2021-06-13 RX ADMIN — Medication 6 MG: at 22:24

## 2021-06-13 RX ADMIN — DOCUSATE SODIUM 100 MG: 100 CAPSULE ORAL at 02:54

## 2021-06-13 NOTE — ASSESSMENT & PLAN NOTE
· Reports fever x 2 days  Tmax 100 7 at home  POD #5 lap prostatectomy  · Afebrile in ED  No leukocytosis  · CT abd/pelvis revealed "Status post prostatectomy with significant inflammatory changes and free fluid in the pelvis  Thickening of the urinary bladder wall which may represent cystitis "  · UA revealed large blood, (-) nitrite  Small leukocyte, innumerable RBC, 4-10 WBC, no bacteria    Patient still with significant suprapubic tenderness    Continue IV ceftriaxone at this point in time and close hospital observation  Urology has been consulted, input will be appreciated  Monitor clinically, temperature curve, white count

## 2021-06-13 NOTE — UTILIZATION REVIEW
/ wbc     Initial Clinical Review    Admission: Date/Time/Statement:   Admission Orders (From admission, onward)     Ordered        06/12/21 2230  Place in Observation  Once                   Orders Placed This Encounter   Procedures    Place in Observation     Standing Status:   Standing     Number of Occurrences:   1     Order Specific Question:   Level of Care     Answer:   Med Surg [16]     ED Arrival Information     Expected Arrival Acuity    - 6/12/2021 19:02 Urgent         Means of arrival Escorted by Service Admission type    Walk-In Family Member General Medicine Urgent         Arrival complaint    post op problem        Chief Complaint   Patient presents with    Post-op Problem     Patient reports had surgery on Monday and feels like he has a post op infection, patient reports low grade fevers [id number]       Initial Presentation:     63year old male presents to ed for evaluation and treatment of possible post op infection with fever 100 7  PMHX: Prostate cancer and LAP PROSTATECTOMY DC ON 6-9  Clinical assessment significant for UA: large blood/ small leukocytes/ innumerable bacteria/wbc 4-10, creatinine 1 37, potassium 3 3  MAXWELL drain present on left with small drainage  Imaging shows inflammation Treated in ed with iv ns bolus and iv ceftriaxone  Admit to observation for post op complication  Plan includes: consult urology, iv ceftriaxone, follow up blood and urine culture, trend cbc and diff and bmp     6-13  Patient with suprapubic tenderness  Continue iv ceftriaxone  Await final cultures and urology recommendations      ED Triage Vitals   Temperature Pulse Respirations Blood Pressure SpO2   06/12/21 1914 06/12/21 1914 06/12/21 1914 06/12/21 1914 06/12/21 1914   98 4 °F (36 9 °C) 63 18 135/65 98 %      Oral Monitor         Pain Score       06/13/21 0000       No Pain          06/12/21 96 2 kg (212 lb)     Additional Vital Signs:       Date/Time  Temp  Pulse  Resp  BP  MAP   SpO2  O2 Device 06/13/21 06:49:23  98 2 °F (36 8 °C)  79  19  126/80  95  99 %  --   06/13/21 06:31:21  --  79  --  129/76  94  93 %  --   06/13/21 0305  --  --  --  --  --  --  None (Room air)   06/13/21 00:04:25  98 3 °F (36 8 °C)  83  18  143/85  104  97 %  --   06/12/21 2145  --  78  20  139/74  97  98 %  None (Room air)                 Pertinent Labs/Diagnostic Test Results:     ECG 12 Lead Documentation Only   Date/Time: 6/12/2021 8:39 PM       Indications / Diagnosis:  Fever    Patient location:  ED  Previous ECG:     Previous ECG:  Compared to current    Comparison ECG info: May 19, 2021    Similarity:  Changes noted  Interpretation:     Interpretation: non-specific    Rate:     ECG rate:  Sixty-five    ECG rate assessment: normal    Rhythm:     Rhythm: sinus rhythm    Comments:      Normal sinus rhythm nonspecific ST-T wave changes         CT abdomen pelvis with contrast   Final  (06/12 2141)      Status post prostatectomy with significant inflammatory changes and free fluid in the pelvis  Findings may represent postsurgical changes however postsurgical complication such as infection or other etiology cannot be ruled out        Thickening of the urinary bladder wall which may represent cystitis            Results from last 7 days   Lab Units 06/13/21 0457 06/12/21 1956 06/08/21  0642   WBC Thousand/uL 6 98 6 31 10 37*   HEMOGLOBIN g/dL 9 7* 9 7* 12 1   HEMATOCRIT % 30 1* 30 1* 36 5   PLATELETS Thousands/uL 219 211 172   NEUTROS ABS Thousands/µL 3 86 3 41  --          Results from last 7 days   Lab Units 06/13/21 0457 06/12/21 1956 06/08/21  0642   SODIUM mmol/L 139 137 141   POTASSIUM mmol/L 3 3* 3 3* 3 1*   CHLORIDE mmol/L 102 100 102   CO2 mmol/L 28 34* 28   ANION GAP mmol/L 9 3* 11   BUN mg/dL 12 13 14   CREATININE mg/dL 1 24 1 37* 1 64*   EGFR ml/min/1 73sq m 75 66 53   CALCIUM mg/dL 8 5 8 7 8 8   MAGNESIUM mg/dL 1 9  --   --      Results from last 7 days   Lab Units 06/13/21 0457 06/12/21 1956   AST U/L 67* 65*   ALT U/L 86* 81*   ALK PHOS U/L 78 78   TOTAL PROTEIN g/dL 6 9 7 4   ALBUMIN g/dL 2 6* 2 9*   TOTAL BILIRUBIN mg/dL 0 61 0 70         Results from last 7 days   Lab Units 06/13/21  0457 06/12/21 1956 06/08/21  0642   GLUCOSE RANDOM mg/dL 102 113 132       Results from last 7 days   Lab Units 06/12/21 1956   PROTIME seconds 12 6   INR  0 99   PTT seconds 32         Results from last 7 days   Lab Units 06/12/21 1956   PROCALCITONIN ng/ml 0 15     Results from last 7 days   Lab Units 06/12/21 1956   LACTIC ACID mmol/L 0 9       Results from last 7 days   Lab Units 06/12/21 1958   CLARITY UA  Slightly Cloudy   COLOR UA  [place]   SPEC GRAV UA  1 015   PH UA  6 5   GLUCOSE UA mg/dl Negative   KETONES UA mg/dl Negative   BLOOD UA  Large*   PROTEIN UA mg/dl 30 (1+)*   NITRITE UA  Negative   BILIRUBIN UA  Negative   UROBILINOGEN UA E U /dl 1 0   LEUKOCYTES UA  Small*   WBC UA /hpf 4-10*   RBC UA /hpf Innumerable*   BACTERIA UA /hpf None Seen   EPITHELIAL CELLS WET PREP /hpf None Seen       Results from last 7 days   Lab Units 06/12/21 2015 06/12/21 1956   BLOOD CULTURE  Received in Microbiology Lab  Culture in Progress  Received in Microbiology Lab  Culture in Progress  ED Treatment:   Medication Administration from 06/12/2021 1901 to 06/12/2021 2350       Date/Time Order Dose Route Action     06/12/2021 1959 sodium chloride 0 9 % bolus 1,000 mL 1,000 mL Intravenous New Bag     06/12/2021 2242 ceftriaxone (ROCEPHIN) 1 g/50 mL in dextrose IVPB 1,000 mg Intravenous New Bag        Past Medical History:   Diagnosis Date    Hypertension      Present on Admission:   Post-operative complication   Acute cystitis with hematuria   Essential hypertension   Hypokalemia   EtOH dependence (HCC)   Acute blood loss anemia      Admitting Diagnosis:     Hypokalemia [E87 6]  UTI (urinary tract infection) [N39 0]  Anemia [D64 9]  Renal insufficiency [N28 9]  Post-operative complication [O34  9XXA]  Postoperative surgical complication involving genitourinary system associated with genitourinary procedure, unspecified complication [A57 63]    Age/Sex: 63 y o  male       Scheduled Medications:  amLODIPine, 5 mg, Oral, Early Morning  cefTRIAXone, 1,000 mg, Intravenous, Q24H  docusate sodium, 100 mg, Oral, BID  folic acid, 1 mg, Oral, Daily  hydrochlorothiazide, 25 mg, Oral, Early Morning  losartan, 100 mg, Oral, Early Morning  melatonin, 6 mg, Oral, HS  multivitamin-minerals, 1 tablet, Oral, Daily  polyethylene glycol, 17 g, Oral, Daily  thiamine, 100 mg, Oral, Daily      Continuous IV Infusions:     PRN Meds:  acetaminophen, 975 mg, Oral, Q6H PRN  aluminum-magnesium hydroxide-simethicone, 30 mL, Oral, Q6H PRN  fluticasone, 1 spray, Each Nare, Daily PRN  ondansetron, 4 mg, Intravenous, Q6H PRN  oxyCODONE, 5 mg, Oral, Q4H PRN        IP CONSULT TO UROLOGY    Network Utilization Review Department  ATTENTION: Please call with any questions or concerns to [phone] and carefully listen to the prompts so that you are directed to the right person  All voicemails are confidential   Destiny Bejarano all requests for admission clinical reviews, approved or denied determinations and any other requests to dedicated fax number below belonging to the campus where the patient is receiving treatment   List of dedicated fax numbers for the Facilities:  [address] DENIALS (Administrative/Medical Necessity) [phone]   [address] (Maternity/NICU/Pediatrics) [phone]   [address] [phone]   [address] [place] [address] [id number] [facility] [address] [place] [address] [facility] [address] [place] [address] [phone]

## 2021-06-13 NOTE — PROGRESS NOTES
[facility]  Progress Note Brennan Muniz February 1965, 63 y o  male MRN: [id number]  Unit/Bed#: -01 Encounter: [id number]  Primary Care Provider: Shiv Asher MD   Date and time admitted to hospital: 6/12/2021  7:24 PM    * Post-operative complication  Assessment & Plan  · Reports fever x 2 days  Tmax 100 7 at home  POD #5 lap prostatectomy  · Afebrile in ED  No leukocytosis  · CT abd/pelvis revealed "Status post prostatectomy with significant inflammatory changes and free fluid in the pelvis  Thickening of the urinary bladder wall which may represent cystitis "  · UA revealed large blood, (-) nitrite  Small leukocyte, innumerable RBC, 4-10 WBC, no bacteria    Patient still with significant suprapubic tenderness  Continue IV ceftriaxone at this point in time and close hospital observation  Urology has been consulted, input will be appreciated  Monitor clinically, temperature curve, white count    Acute cystitis with hematuria  Assessment & Plan  · See AP above    Acute blood loss anemia  Assessment & Plan  · Hb 9 7 on admission  Was 12 1 on 6/8/21  · Denies jhony blood in urine  States urine has been orange since surgery  Denies melena/hematochezia      Continue to monitor CBC    EtOH dependence ([facility] [address] )  Assessment & Plan  · States consumes 1-2 shots of PIPPA nightly  · MVI, thiamine and folic acid daily    Hypokalemia  Assessment & Plan  · Potassium 3 3 on admission    Has been replaced  Monitor BMP    Essential hypertension  Assessment & Plan  · BP adequately controlled on current regimen  · Continue home dose Norvasc, HCTZ and Losartan  · Monitor BP per unit protocol      VTE Pharmacologic Prophylaxis:   Pharmacologic: Pharmacologic VTE Prophylaxis contraindicated due to Anemia, low risk  Mechanical VTE Prophylaxis in Place: Yes    Patient Centered Rounds: I have performed bedside rounds with nursing staff today      Discussions with Specialists or Other Care Team Provider: Discussed with Urology    Education and Discussions with Family / Patient:  Patient    Time Spent for Care: 30 minutes  More than 50% of total time spent on counseling and coordination of care as described above  Current Length of Stay: 0 day(s)    Current Patient Status: Observation   Certification Statement: The patient will continue to require additional inpatient hospital stay due to Need for IV antibiotics, significant suprapubic abdominal pain    Discharge Plan:  Once stable    Code Status: Level 1 - Full Code      Subjective:     Patient evaluated this morning  Still complains significant suprapubic abdominal pain, is very tender to palpation  Otherwise denies nausea vomiting  Afebrile  No other events reported  Objective:     Vitals:   Temp (24hrs), Av 3 °F (36 8 °C), Min:98 2 °F (36 8 °C), Max:98 4 °F (36 9 °C)    Temp:  [98 2 °F (36 8 °C)-98 4 °F (36 9 °C)] 98 2 °F (36 8 °C)  HR:  [63-83] 79  Resp:  [18-20] 19  BP: (126-143)/(65-85) 126/80  SpO2:  [93 %-99 %] 99 %  Body mass index is 31 31 kg/m²  Input and Output Summary (last 24 hours): Intake/Output Summary (Last 24 hours) at 2021 1153  Last data filed at 2021 1101  Gross per 24 hour   Intake 1820 ml   Output 1295 ml   Net 525 ml       Physical Exam:     Physical Exam  Vitals and nursing note reviewed  Constitutional:       General: He is in acute distress  Appearance: Normal appearance  Comments: Middle-age male in bed, in mild acute distress due to pain   HENT:      Head: Normocephalic and atraumatic  Right Ear: External ear normal       Left Ear: External ear normal       Nose: Nose normal  No congestion or rhinorrhea  Mouth/Throat:      Mouth: Mucous membranes are moist       Pharynx: Oropharynx is clear  No oropharyngeal exudate or posterior oropharyngeal erythema  Eyes:      General: No scleral icterus  Right eye: No discharge  Left eye: No discharge        Pupils: Pupils are equal, round, and reactive to light  Neck:      Vascular: No carotid bruit  Cardiovascular:      Rate and Rhythm: Normal rate and regular rhythm  Pulses: Normal pulses  Heart sounds: No murmur heard  No friction rub  No gallop  Pulmonary:      Effort: Pulmonary effort is normal  No respiratory distress  Breath sounds: Normal breath sounds  No stridor  No wheezing, rhonchi or rales  Abdominal:      General: Abdomen is flat  Bowel sounds are normal  There is no distension  Palpations: Abdomen is soft  There is no mass  Tenderness: There is abdominal tenderness  There is no guarding or rebound  Hernia: No hernia is present  Comments: Tenderness to palpation of hypogastric area without rebound or guarding   Musculoskeletal:         General: No swelling, tenderness, deformity or signs of injury  Normal range of motion  Cervical back: Normal range of motion  No rigidity  No muscular tenderness  Lymphadenopathy:      Cervical: No cervical adenopathy  Skin:     General: Skin is warm and dry  Capillary Refill: Capillary refill takes less than 2 seconds  Coloration: Skin is not jaundiced or pale  Findings: No bruising or erythema  Neurological:      General: No focal deficit present  Mental Status: He is alert and oriented to person, place, and time  Mental status is at baseline  Cranial Nerves: No cranial nerve deficit  Sensory: No sensory deficit  Motor: No weakness  Coordination: Coordination normal       Deep Tendon Reflexes: Reflexes normal    Psychiatric:         Mood and Affect: Mood normal          Behavior: Behavior normal          Thought Content:  Thought content normal          Judgment: Judgment normal            Additional Data:     Labs:    Results from last 7 days   Lab Units 06/13/21  0457   WBC Thousand/uL 6 98   HEMOGLOBIN g/dL 9 7*   HEMATOCRIT % 30 1*   PLATELETS Thousands/uL 219   NEUTROS PCT % 56   LYMPHS PCT % 29   MONOS PCT % 12   EOS PCT % 3     Results from last 7 days   Lab Units 06/13/21  0457   SODIUM mmol/L 139   POTASSIUM mmol/L 3 3*   CHLORIDE mmol/L 102   CO2 mmol/L 28   BUN mg/dL 12   CREATININE mg/dL 1 24   ANION GAP mmol/L 9   CALCIUM mg/dL 8 5   ALBUMIN g/dL 2 6*   TOTAL BILIRUBIN mg/dL 0 61   ALK PHOS U/L 78   ALT U/L 86*   AST U/L 67*   GLUCOSE RANDOM mg/dL 102     Results from last 7 days   Lab Units 06/12/21 1956   INR  0 99             Results from last 7 days   Lab Units 06/12/21 1956   LACTIC ACID mmol/L 0 9   PROCALCITONIN ng/ml 0 15           * I Have Reviewed All Lab Data Listed Above  * Additional Pertinent Lab Tests Reviewed: All [facility] Admission Reviewed      Recent Cultures (last 7 days):     Results from last 7 days   Lab Units 06/12/21 2015 06/12/21 1956   BLOOD CULTURE  Received in Microbiology Lab  Culture in Progress  Received in Microbiology Lab  Culture in Progress         Last 24 Hours Medication List:   Current Facility-Administered Medications   Medication Dose Route Frequency Provider Last Rate    acetaminophen  975 mg Oral Q6H PRN Isaiah Morse PA-C      aluminum-magnesium hydroxide-simethicone  30 mL Oral Q6H PRN Isaiah Morse PA-C      amLODIPine  5 mg Oral Early Morning Isaiah Morse PA-C      cefTRIAXone  1,000 mg Intravenous Q24H [place], [place]      docusate sodium  100 mg Oral BID Isaiah Morse PA-C      fluticasone  1 spray Each Nare Daily PRN Isaiah Morse PA-C      folic acid  1 mg Oral Daily Isaiah Morse PA-C      hydrochlorothiazide  25 mg Oral Early Morning Isaiah Morse PA-C      losartan  100 mg Oral Early Morning Bruce Cunningham      melatonin  6 mg Oral HS Isaiah Morse [place]      multivitamin-minerals  1 tablet Oral Daily Isaiah Morse [place]      ondansetron  4 mg Intravenous Q6H PRN Isaiah Morse PA-C      oxyCODONE  5 mg Oral Q4H PRN Isaiah Morse PA-C      polyethylene glycol  17 g Oral Daily Reyna [place], [place]      thiamine  100 mg Oral Daily Reynarenée Elliott, [place]          Today, Patient Was Seen By: Lex Fajardo MD    ** Please Note: Dictation voice to text software may have been used in the creation of this document   **

## 2021-06-13 NOTE — ED CARE HANDOFF
Emergency Department Sign Out Note        Sign out and transfer of care from Dr Saida Casarez  See Separate Emergency Department note  The patient, Wilfredo Tejeda, was evaluated by the previous provider for postop fever  Workup Completed:  Labs completed, ct performed and awaiting read  ED Course / Workup Pending (followup):       CT abdomen pelvis with contrast   Final Result by Taylor Freeman DO (06/12 2141)      Status post prostatectomy with significant inflammatory changes and free fluid in the pelvis  Findings may represent postsurgical changes however postsurgical complication such as infection or other etiology cannot be ruled out  Thickening of the urinary bladder wall which may represent cystitis      The study was marked in EPIC for immediate notification  Workstation performed: [id number]           Patient started on ceftriaxone, discussed case with Urology on-call who recommended admission at this campus with antibiotics and no surgical intervention at this time  Admitted to Internal Medicine for further care, hemodynamically stable and comfortable at that time          Luís Drake' Criteria for PE      Most Recent Value   Wells' Criteria for PE   Clinical signs and symptoms of DVT  0 Filed at: 06/12/2021 1953   PE is primary diagnosis or equally likely  0 Filed at: 06/12/2021 1953   HR >100  0 Filed at: 06/12/2021 1953   Immobilization at least 3 days or Surgery in the previous 4 weeks  1 5 Filed at: 06/12/2021 1953   Previous, objectively diagnosed PE or DVT  0 Filed at: 06/12/2021 1953   Hemoptysis  0 Filed at: 06/12/2021 1953   Malignancy with treatment within 6 months or palliative  0 Filed at: 06/12/2021 1953   Wells' Criteria Total  1 5 Filed at: 06/12/2021 1953                       Procedures  MDM    Disposition  Final diagnoses:   None     ED Disposition     None      Follow-up Information    None       Patient's Medications   Discharge Prescriptions    No medications on file     No discharge procedures on file         ED Provider  Electronically Signed by     Beto Duke MD  06/12/21 7635

## 2021-06-13 NOTE — H&P
[facility]  H&P [facility] February 1965, 63 y o  male MRN: [id number]  Unit/Bed#: ED 14 Encounter: [id number]  Primary Care Provider: Bettye Grier MD   Date and time admitted to hospital: 6/12/2021  7:24 PM    * Post-operative complication  Assessment & Plan  · Reports fever x 2 days  Tmax 100 7 at home  POD #5 lap prostatectomy  · Afebrile in ED  No leukocytosis  · CT abd/pelvis revealed "Status post prostatectomy with significant inflammatory changes and free fluid in the pelvis  Thickening of the urinary bladder wall which may represent cystitis "  · UA revealed large blood, (-) nitrite  Small leukocyte, innumerable RBC, 4-10 WBC, no bacteria  · Received dose of Rocephin in ED  Will continue pending urology recommendation  · Consult urology    Acute cystitis with hematuria  Assessment & Plan  · See AP above    Hypokalemia  Assessment & Plan  · Potassium 3 3 on admission  · Kdur 40meq PO x 1  · BMP in am    Acute blood loss anemia  Assessment & Plan  · Hb 9 7 on admission  Was 12 1 on 6/8/21  · Denies jhony blood in urine  States urine has been orange since surgery  Denies melena/hematochezia  · CBC in am    Essential hypertension  Assessment & Plan  · BP adequately controlled on current regimen  · Continue home dose Norvasc, HCTZ and Losartan  · Monitor BP per unit protocol    EtOH dependence (HCC)  Assessment & Plan  · States consumes 1-2 shots of PIPPA nightly  · MVI, thiamine and folic acid daily      VTE Prophylaxis: Pharmacologic VTE Prophylaxis contraindicated due to acute blood loss anemia  / sequential compression device   Code Status: Level 1 Full Code  POLST: POLST form is not discussed and not completed at this time  Discussion with family: Wife at bedside    Anticipated Length of Stay:  Patient will be admitted on an Observation basis with an anticipated length of stay of  Less than 2 midnights     Justification for Hospital Stay: See AP above    Total Time for Visit, including Counseling / Coordination of Care: 45 minutes  Greater than 50% of this total time spent on direct patient counseling and coordination of care  Chief Complaint:   fever    History of Present Illness:    Juliana Agudelo February is a 63 y o  male who presents with Reports fever x 2 days  Tmax 100 7 at home  POD #5 lap prostatectomy  Review of Systems:    Review of Systems   Constitutional: Positive for chills and fever  HENT: Negative for congestion, ear pain, sinus pressure and sore throat  Eyes: Negative for visual disturbance  Respiratory: Negative for cough, shortness of breath and wheezing  Cardiovascular: Negative for chest pain, palpitations and leg swelling  Gastrointestinal: Positive for abdominal pain  Negative for constipation, diarrhea, nausea and vomiting  Genitourinary: Negative for dysuria, flank pain and hematuria  Calixto cath in place   Musculoskeletal: Negative for neck pain and neck stiffness  Neurological: Negative for dizziness, syncope, light-headedness and headaches  All other systems reviewed and are negative  Past Medical and Surgical History:     Past Medical History:   Diagnosis Date    Hypertension        Past Surgical History:   Procedure Laterality Date    COLONOSCOPY      OH LAP,PROSTATECTOMY,RADICAL,W/NERVE SPARE,INCL ROBOTIC N/A 6/7/2021    Procedure: ROBOTIC LAPAROSCOPIC RADICAL PROSTATECTOMY AND PELVIC LYMPH NODE DISSECTION;  Surgeon: Julissa Johnson MD;  Location: [facility];  Service: Urology       Meds/Allergies:    Prior to Admission medications    Medication Sig Start Date End Date Taking?  Authorizing Provider   amLODIPine (NORVASC) 5 mg tablet Take 5 mg by mouth daily in the early morning     Historical Provider, MD   docusate sodium (COLACE) 100 mg capsule Take 1 capsule (100 mg total) by mouth 3 (three) times a day for 14 days 6/7/21 6/21/21  Julissa Johnson MD   fluticasone (FLONASE) 50 mcg/act nasal spray fluticasone propionate 50 mcg/actuation nasal spray,suspension   one puff each nostril daily prn  Historical Provider, MD   hydrochlorothiazide (HYDRODIURIL) 25 mg tablet Take 25 mg by mouth daily in the early morning     Historical Provider, MD   losartan (COZAAR) 100 MG tablet Take 100 mg by mouth daily in the early morning     Historical Provider, MD   multivitamin (THERAGRAN) TABS Take 1 tablet by mouth daily    Historical Provider, MD   oxyCODONE (ROXICODONE) 5 mg immediate release tablet Take 1 tablet (5 mg total) by mouth every 4 (four) hours as needed for moderate pain for up to 5 daysMax Daily Amount: 30 mg 21  Dang Rene MD   polyethylene glycol (MIRALAX) 17 g packet Take 17 g by mouth daily for 7 days 21  Dang Rene MD     I have reviewed home medications with patient personally      Allergies: No Known Allergies    Social History:     Marital Status: /Civil Union   Patient Pre-hospital Living Situation: Lives w wife  Patient Pre-hospital Level of Mobility: Ambulatory  Patient Pre-hospital Diet Restrictions: None  Substance Use History:   Social History     Substance and Sexual Activity   Alcohol Use Yes    Alcohol/week: 7 0 - 14 0 standard drinks    Types: 7 - 14 Shots of liquor per week    Frequency: 4 or more times a week    Drinks per session: 1 or 2    Binge frequency: Never    Comment: 1-2 shots of PIPPA nightly     Social History     Tobacco Use   Smoking Status Former Smoker    Quit date:     Years since quittin 4   Smokeless Tobacco Never Used     Social History     Substance and Sexual Activity   Drug Use Not Currently       Family History:    Family History   Problem Relation Age of Onset    Hypertension Father        Physical Exam:     Vitals:   Blood Pressure: 139/74 (21)  Pulse: 78 (21)  Temperature: 98 4 °F (36 9 °C) (21)  Temp Source: Oral (21)  Respirations: 20 (21)  Height: 5' 9" (175 3 cm) (06/12/21 1914)  Weight - Scale: 96 2 kg (212 lb) (06/12/21 1914)  SpO2: 98 % (06/12/21 2145)    Physical Exam  Vitals reviewed  Constitutional:       General: He is not in acute distress  Appearance: Normal appearance  HENT:      Head: Normocephalic and atraumatic  Mouth/Throat:      Comments: deferred  Eyes:      Extraocular Movements: Extraocular movements intact  Cardiovascular:      Rate and Rhythm: Normal rate and regular rhythm  Pulses: Normal pulses  Heart sounds: Normal heart sounds  Pulmonary:      Effort: Pulmonary effort is normal  No respiratory distress  Breath sounds: Normal breath sounds  No wheezing or rhonchi  Abdominal:      Palpations: Abdomen is soft  Tenderness: There is no abdominal tenderness  There is no guarding or rebound  Musculoskeletal:         General: No swelling or tenderness  Normal range of motion  Cervical back: Normal range of motion and neck supple  Skin:     General: Skin is warm and dry  Neurological:      General: No focal deficit present  Mental Status: He is alert and oriented to person, place, and time  Psychiatric:         Mood and Affect: Mood normal          Behavior: Behavior normal          Thought Content: Thought content normal          Additional Data:     Lab Results: I have personally reviewed pertinent reports        Results from last 7 days   Lab Units 06/12/21 1956   WBC Thousand/uL 6 31   HEMOGLOBIN g/dL 9 7*   HEMATOCRIT % 30 1*   PLATELETS Thousands/uL 211   NEUTROS PCT % 54   LYMPHS PCT % 31   MONOS PCT % 11   EOS PCT % 3     Results from last 7 days   Lab Units 06/12/21 1956   SODIUM mmol/L 137   POTASSIUM mmol/L 3 3*   CHLORIDE mmol/L 100   CO2 mmol/L 34*   BUN mg/dL 13   CREATININE mg/dL 1 37*   ANION GAP mmol/L 3*   CALCIUM mg/dL 8 7   ALBUMIN g/dL 2 9*   TOTAL BILIRUBIN mg/dL 0 70   ALK PHOS U/L 78   ALT U/L 81*   AST U/L 65*   GLUCOSE RANDOM mg/dL 113     Results from last 7 days   Lab Units 06/12/21 1956   INR  0 99             Results from last 7 days   Lab Units 06/12/21 1956   LACTIC ACID mmol/L 0 9       Imaging: I have personally reviewed pertinent reports  CT abdomen pelvis with contrast   Final Result by Kenneth Marcelo DO (06/12 2141)      Status post prostatectomy with significant inflammatory changes and free fluid in the pelvis  Findings may represent postsurgical changes however postsurgical complication such as infection or other etiology cannot be ruled out  Thickening of the urinary bladder wall which may represent cystitis      The study was marked in EPIC for immediate notification  Workstation performed: [id number]             EKG, Pathology, and Other Studies Reviewed on Admission:   · EKG: NA    Allscripts / Epic Records Reviewed: Yes     ** Please Note: This note has been constructed using a voice recognition system   **

## 2021-06-13 NOTE — PLAN OF CARE
Problem: Potential for Falls  Goal: Patient will remain free of falls  Description: INTERVENTIONS:  - Assess patient frequently for physical needs  -  Identify cognitive and physical deficits and behaviors that affect risk of falls    -  [place] fall precautions as indicated by assessment   - Educate patient/family on patient safety including physical limitations  - Instruct patient to call for assistance with activity based on assessment  - Modify environment to reduce risk of injury  - Consider OT/PT consult to assist with strengthening/mobility  Outcome: Progressing     Problem: PAIN - ADULT  Goal: Verbalizes/displays adequate comfort level or baseline comfort level  Description: Interventions:  - Encourage patient to monitor pain and request assistance  - Assess pain using appropriate pain scale  - Administer analgesics based on type and severity of pain and evaluate response  - Implement non-pharmacological measures as appropriate and evaluate response  - Consider cultural and social influences on pain and pain management  - Notify physician/advanced practitioner if interventions unsuccessful or patient reports new pain  Outcome: Progressing     Problem: INFECTION - ADULT  Goal: Absence or prevention of progression during hospitalization  Description: INTERVENTIONS:  - Assess and monitor for signs and symptoms of infection  - Monitor lab/diagnostic results  - Monitor all insertion sites, i e  indwelling lines, tubes, and drains  - Monitor endotracheal if appropriate and nasal secretions for changes in amount and color  - [place] appropriate cooling/warming therapies per order  - Administer medications as ordered  - Instruct and encourage patient and family to use good hand hygiene technique  - Identify and instruct in appropriate isolation precautions for identified infection/condition  Outcome: Progressing  Goal: Absence of fever/infection during neutropenic period  Description: INTERVENTIONS:  - Monitor WBC    Outcome: Progressing     Problem: SAFETY ADULT  Goal: Patient will remain free of falls  Description: INTERVENTIONS:  - Assess patient frequently for physical needs  -  Identify cognitive and physical deficits and behaviors that affect risk of falls    -  [place] fall precautions as indicated by assessment   - Educate patient/family on patient safety including physical limitations  - Instruct patient to call for assistance with activity based on assessment  - Modify environment to reduce risk of injury  - Consider OT/PT consult to assist with strengthening/mobility  Outcome: Progressing  Goal: Maintain or return to baseline ADL function  Description: INTERVENTIONS:  -  Assess patient's ability to carry out ADLs; assess patient's baseline for ADL function and identify physical deficits which impact ability to perform ADLs (bathing, care of mouth/teeth, toileting, grooming, dressing, etc )  - Assess/evaluate cause of self-care deficits   - Assess range of motion  - Assess patient's mobility; develop plan if impaired  - Assess patient's need for assistive devices and provide as appropriate  - Encourage maximum independence but intervene and supervise when necessary  - Involve family in performance of ADLs  - Assess for home care needs following discharge   - Consider OT consult to assist with ADL evaluation and planning for discharge  - Provide patient education as appropriate  Outcome: Progressing  Goal: Maintain or return mobility status to optimal level  Description: INTERVENTIONS:  - Assess patient's baseline mobility status (ambulation, transfers, stairs, etc )    - Identify cognitive and physical deficits and behaviors that affect mobility  - Identify mobility aids required to assist with transfers and/or ambulation (gait belt, sit-to-stand, lift, walker, cane, etc )  - [place] fall precautions as indicated by assessment  - Record patient progress and toleration of activity level on Mobility SBAR; progress patient to next Phase/Stage  - Instruct patient to call for assistance with activity based on assessment  - Consider rehabilitation consult to assist with strengthening/weightbearing, etc   Outcome: Progressing     Problem: DISCHARGE PLANNING  Goal: Discharge to home or other facility with appropriate resources  Description: INTERVENTIONS:  - Identify barriers to discharge w/patient and caregiver  - Arrange for needed discharge resources and transportation as appropriate  - Identify discharge learning needs (meds, wound care, etc )  - Arrange for interpretive services to assist at discharge as needed  - Refer to Case Management Department for coordinating discharge planning if the patient needs post-hospital services based on physician/advanced practitioner order or complex needs related to functional status, cognitive ability, or social support system  Outcome: Progressing     Problem: Knowledge Deficit  Goal: Patient/family/caregiver demonstrates understanding of disease process, treatment plan, medications, and discharge instructions  Description: Complete learning assessment and assess knowledge base    Interventions:  - Provide teaching at level of understanding  - Provide teaching via preferred learning methods  Outcome: Progressing

## 2021-06-13 NOTE — ASSESSMENT & PLAN NOTE
· BP adequately controlled on current regimen  · Continue home dose Norvasc, HCTZ and Losartan  · Monitor BP per unit protocol

## 2021-06-13 NOTE — PLAN OF CARE
Problem: Potential for Falls  Goal: Patient will remain free of falls  Description: INTERVENTIONS:  - Assess patient frequently for physical needs  -  Identify cognitive and physical deficits and behaviors that affect risk of falls    -  [place] fall precautions as indicated by assessment   - Educate patient/family on patient safety including physical limitations  - Instruct patient to call for assistance with activity based on assessment  - Modify environment to reduce risk of injury  - Consider OT/PT consult to assist with strengthening/mobility  Outcome: Progressing     Problem: PAIN - ADULT  Goal: Verbalizes/displays adequate comfort level or baseline comfort level  Description: Interventions:  - Encourage patient to monitor pain and request assistance  - Assess pain using appropriate pain scale  - Administer analgesics based on type and severity of pain and evaluate response  - Implement non-pharmacological measures as appropriate and evaluate response  - Consider cultural and social influences on pain and pain management  - Notify physician/advanced practitioner if interventions unsuccessful or patient reports new pain  Outcome: Progressing     Problem: INFECTION - ADULT  Goal: Absence or prevention of progression during hospitalization  Description: INTERVENTIONS:  - Assess and monitor for signs and symptoms of infection  - Monitor lab/diagnostic results  - Monitor all insertion sites, i e  indwelling lines, tubes, and drains  - Monitor endotracheal if appropriate and nasal secretions for changes in amount and color  - [place] appropriate cooling/warming therapies per order  - Administer medications as ordered  - Instruct and encourage patient and family to use good hand hygiene technique  - Identify and instruct in appropriate isolation precautions for identified infection/condition  Outcome: Progressing  Goal: Absence of fever/infection during neutropenic period  Description: INTERVENTIONS:  - Monitor WBC    Outcome: Progressing     Problem: SAFETY ADULT  Goal: Patient will remain free of falls  Description: INTERVENTIONS:  - Assess patient frequently for physical needs  -  Identify cognitive and physical deficits and behaviors that affect risk of falls    -  [place] fall precautions as indicated by assessment   - Educate patient/family on patient safety including physical limitations  - Instruct patient to call for assistance with activity based on assessment  - Modify environment to reduce risk of injury  - Consider OT/PT consult to assist with strengthening/mobility  Outcome: Progressing  Goal: Maintain or return to baseline ADL function  Description: INTERVENTIONS:  -  Assess patient's ability to carry out ADLs; assess patient's baseline for ADL function and identify physical deficits which impact ability to perform ADLs (bathing, care of mouth/teeth, toileting, grooming, dressing, etc )  - Assess/evaluate cause of self-care deficits   - Assess range of motion  - Assess patient's mobility; develop plan if impaired  - Assess patient's need for assistive devices and provide as appropriate  - Encourage maximum independence but intervene and supervise when necessary  - Involve family in performance of ADLs  - Assess for home care needs following discharge   - Consider OT consult to assist with ADL evaluation and planning for discharge  - Provide patient education as appropriate  Outcome: Progressing  Goal: Maintain or return mobility status to optimal level  Description: INTERVENTIONS:  - Assess patient's baseline mobility status (ambulation, transfers, stairs, etc )    - Identify cognitive and physical deficits and behaviors that affect mobility  - Identify mobility aids required to assist with transfers and/or ambulation (gait belt, sit-to-stand, lift, walker, cane, etc )  - [place] fall precautions as indicated by assessment  - Record patient progress and toleration of activity level on Mobility SBAR; progress patient to next Phase/Stage  - Instruct patient to call for assistance with activity based on assessment  - Consider rehabilitation consult to assist with strengthening/weightbearing, etc   Outcome: Progressing     Problem: DISCHARGE PLANNING  Goal: Discharge to home or other facility with appropriate resources  Description: INTERVENTIONS:  - Identify barriers to discharge w/patient and caregiver  - Arrange for needed discharge resources and transportation as appropriate  - Identify discharge learning needs (meds, wound care, etc )  - Arrange for interpretive services to assist at discharge as needed  - Refer to Case Management Department for coordinating discharge planning if the patient needs post-hospital services based on physician/advanced practitioner order or complex needs related to functional status, cognitive ability, or social support system  Outcome: Progressing     Problem: Knowledge Deficit  Goal: Patient/family/caregiver demonstrates understanding of disease process, treatment plan, medications, and discharge instructions  Description: Complete learning assessment and assess knowledge base    Interventions:  - Provide teaching at level of understanding  - Provide teaching via preferred learning methods  Outcome: Progressing     Problem: MOBILITY - ADULT  Goal: Maintain or return to baseline ADL function  Description: INTERVENTIONS:  -  Assess patient's ability to carry out ADLs; assess patient's baseline for ADL function and identify physical deficits which impact ability to perform ADLs (bathing, care of mouth/teeth, toileting, grooming, dressing, etc )  - Assess/evaluate cause of self-care deficits   - Assess range of motion  - Assess patient's mobility; develop plan if impaired  - Assess patient's need for assistive devices and provide as appropriate  - Encourage maximum independence but intervene and supervise when necessary  - Involve family in performance of ADLs  - Assess for home care needs following discharge   - Consider OT consult to assist with ADL evaluation and planning for discharge  - Provide patient education as appropriate  Outcome: Progressing  Goal: Maintain or return mobility status to optimal level  Description: INTERVENTIONS:  - Assess patient's baseline mobility status (ambulation, transfers, stairs, etc )    - Identify cognitive and physical deficits and behaviors that affect mobility  - Identify mobility aids required to assist with transfers and/or ambulation (gait belt, sit-to-stand, lift, walker, cane, etc )  - [place] fall precautions as indicated by assessment  - Record patient progress and toleration of activity level on Mobility SBAR; progress patient to next Phase/Stage  - Instruct patient to call for assistance with activity based on assessment  - Consider rehabilitation consult to assist with strengthening/weightbearing, etc   Outcome: Progressing     Problem: GASTROINTESTINAL - ADULT  Goal: Maintains or returns to baseline bowel function  Description: INTERVENTIONS:  - Assess bowel function  - Encourage oral fluids to ensure adequate hydration  - Administer IV fluids if ordered to ensure adequate hydration  - Administer ordered medications as needed  - Encourage mobilization and activity  - Consider nutritional services referral to assist patient with adequate nutrition and appropriate food choices  Outcome: Progressing  Goal: Maintains adequate nutritional intake  Description: INTERVENTIONS:  - Monitor percentage of each meal consumed  - Identify factors contributing to decreased intake, treat as appropriate  - Assist with meals as needed  - Monitor I&O, weight, and lab values if indicated  - Obtain nutrition services referral as needed  Outcome: Progressing     Problem: GENITOURINARY - ADULT  Goal: Urinary catheter remains patent  Description: INTERVENTIONS:  - Assess patency of urinary catheter  - If patient has a chronic candelario, consider changing catheter if non-functioning  - Follow guidelines for intermittent irrigation of non-functioning urinary catheter  Outcome: Progressing     Problem: SKIN/TISSUE INTEGRITY - ADULT  Goal: Incision(s), wounds(s) or drain site(s) healing without S/S of infection  Description: INTERVENTIONS  - Assess and document dressing, incision, wound bed, drain sites and surrounding tissue  - Provide patient and family education  - Perform skin care/dressing changes every  Outcome: Progressing

## 2021-06-13 NOTE — ASSESSMENT & PLAN NOTE
· Reports fever x 2 days  Tmax 100 7 at home  POD #5 lap prostatectomy  · Afebrile in ED  No leukocytosis  · CT abd/pelvis revealed "Status post prostatectomy with significant inflammatory changes and free fluid in the pelvis  Thickening of the urinary bladder wall which may represent cystitis "  · UA revealed large blood, (-) nitrite  Small leukocyte, innumerable RBC, 4-10 WBC, no bacteria  · Received dose of Rocephin in ED   Will continue pending urology recommendation  · Consult urology

## 2021-06-13 NOTE — ASSESSMENT & PLAN NOTE
· Hb 9 7 on admission  Was 12 1 on 6/8/21  · Denies jhony blood in urine  States urine has been orange since surgery   Denies melena/hematochezia  · CBC in am

## 2021-06-13 NOTE — UTILIZATION REVIEW
Observation Admission Authorization Request   NOTIFICATION OF OBSERVATION ADMISSION/OBSERVATION AUTHORIZATION REQUEST   SERVICING FACILITY:   [address] [place] [address]  Tax ID: [id number]  NPI: [id number]  Place of Service: On [address] [place] Code: 22  CPT Code for Observation: CPT   CPT 21174     ATTENDING PROVIDER:  Attending Name and NPI#: Da Candelaria Md [[id number]]  Address: [address] [place] [address]  Phone: [phone]     UTILIZATION REVIEW CONTACT:  [facility] Utilization   Network Utilization Review Department  Phone: [phone]  Fax [phone]  Email: [email or web address]     PHYSICIAN ADVISORY SERVICES:  FOR [id number] REVIEW - MEDICAL NECESSITY DENIAL  Phone: [phone]  Fax: [phone]  Email: [email or web address]     TYPE OF REQUEST:  Observation Status     ADMISSION INFORMATION:  ADMISSION DATE/TIME:  PATIENT DIAGNOSIS CODE/DESCRIPTION:  Hypokalemia [E87 6]  UTI (urinary tract infection) [N39 0]  Anemia [D64 9]  Renal insufficiency [N28 9]  Post-operative complication [Y31  9XXA]  Postoperative surgical complication involving genitourinary system associated with genitourinary procedure, unspecified complication [V65 28]  DISCHARGE DATE/TIME: No discharge date for patient encounter  DISCHARGE DISPOSITION (IF DISCHARGED): Home/Self Care     IMPORTANT INFORMATION:  Please contact the [facility] directly with any questions or concerns regarding this request  Department voicemails are confidential     Send requests for admission clinical reviews, concurrent reviews, approvals, and administrative denials due to lack of clinical to fax [phone]

## 2021-06-14 VITALS
DIASTOLIC BLOOD PRESSURE: 74 MMHG | RESPIRATION RATE: 19 BRPM | SYSTOLIC BLOOD PRESSURE: 137 MMHG | TEMPERATURE: 98.7 F | HEIGHT: 69 IN | WEIGHT: 212 LBS | HEART RATE: 89 BPM | BODY MASS INDEX: 31.4 KG/M2 | OXYGEN SATURATION: 99 %

## 2021-06-14 LAB
ANION GAP SERPL CALCULATED.3IONS-SCNC: 10 MMOL/L (ref 4–13)
BASOPHILS # BLD AUTO: 0.02 THOUSANDS/ΜL (ref 0–0.1)
BASOPHILS NFR BLD AUTO: 0 % (ref 0–1)
BUN SERPL-MCNC: 11 MG/DL (ref 5–25)
CALCIUM SERPL-MCNC: 9 MG/DL (ref 8.3–10.1)
CHLORIDE SERPL-SCNC: 102 MMOL/L (ref 100–108)
CO2 SERPL-SCNC: 27 MMOL/L (ref 21–32)
CREAT SERPL-MCNC: 1.1 MG/DL (ref 0.6–1.3)
EOSINOPHIL # BLD AUTO: 0.19 THOUSAND/ΜL (ref 0–0.61)
EOSINOPHIL NFR BLD AUTO: 2 % (ref 0–6)
ERYTHROCYTE [DISTWIDTH] IN BLOOD BY AUTOMATED COUNT: 11.6 % (ref 11.6–15.1)
GFR SERPL CREATININE-BSD FRML MDRD: 86 ML/MIN/1.73SQ M
GLUCOSE P FAST SERPL-MCNC: 100 MG/DL (ref 65–99)
GLUCOSE SERPL-MCNC: 100 MG/DL (ref 65–140)
HCT VFR BLD AUTO: 30.1 % (ref 36.5–49.3)
HGB BLD-MCNC: 9.8 G/DL (ref 12–17)
IMM GRANULOCYTES # BLD AUTO: 0.04 THOUSAND/UL (ref 0–0.2)
IMM GRANULOCYTES NFR BLD AUTO: 1 % (ref 0–2)
LYMPHOCYTES # BLD AUTO: 2.15 THOUSANDS/ΜL (ref 0.6–4.47)
LYMPHOCYTES NFR BLD AUTO: 27 % (ref 14–44)
MCH RBC QN AUTO: 32.3 PG (ref 26.8–34.3)
MCHC RBC AUTO-ENTMCNC: 32.6 G/DL (ref 31.4–37.4)
MCV RBC AUTO: 99 FL (ref 82–98)
MONOCYTES # BLD AUTO: 0.79 THOUSAND/ΜL (ref 0.17–1.22)
MONOCYTES NFR BLD AUTO: 10 % (ref 4–12)
NEUTROPHILS # BLD AUTO: 4.7 THOUSANDS/ΜL (ref 1.85–7.62)
NEUTS SEG NFR BLD AUTO: 60 % (ref 43–75)
NRBC BLD AUTO-RTO: 0 /100 WBCS
PLATELET # BLD AUTO: 226 THOUSANDS/UL (ref 149–390)
PMV BLD AUTO: 10.4 FL (ref 8.9–12.7)
POTASSIUM SERPL-SCNC: 3.4 MMOL/L (ref 3.5–5.3)
RBC # BLD AUTO: 3.03 MILLION/UL (ref 3.88–5.62)
SODIUM SERPL-SCNC: 139 MMOL/L (ref 136–145)
WBC # BLD AUTO: 7.89 THOUSAND/UL (ref 4.31–10.16)

## 2021-06-14 PROCEDURE — 80048 BASIC METABOLIC PNL TOTAL CA: CPT | Performed by: INTERNAL MEDICINE

## 2021-06-14 PROCEDURE — 85025 COMPLETE CBC W/AUTO DIFF WBC: CPT | Performed by: INTERNAL MEDICINE

## 2021-06-14 PROCEDURE — 99217 PR OBSERVATION CARE DISCHARGE MANAGEMENT: CPT | Performed by: INTERNAL MEDICINE

## 2021-06-14 PROCEDURE — NC001 PR NO CHARGE: Performed by: NURSE PRACTITIONER

## 2021-06-14 RX ORDER — CEFPODOXIME PROXETIL 100 MG/1
100 TABLET, FILM COATED ORAL 2 TIMES DAILY
Qty: 10 TABLET | Refills: 0 | Status: SHIPPED | OUTPATIENT
Start: 2021-06-14 | End: 2021-06-19

## 2021-06-14 RX ORDER — OXYCODONE HYDROCHLORIDE 5 MG/1
5 TABLET ORAL EVERY 4 HOURS PRN
Qty: 10 TABLET | Refills: 0 | Status: SHIPPED | COMMUNITY
Start: 2021-06-14 | End: 2021-06-17

## 2021-06-14 RX ADMIN — AMLODIPINE BESYLATE 5 MG: 5 TABLET ORAL at 05:40

## 2021-06-14 RX ADMIN — THIAMINE HCL TAB 100 MG 100 MG: 100 TAB at 08:40

## 2021-06-14 RX ADMIN — HYDROCHLOROTHIAZIDE 25 MG: 25 TABLET ORAL at 05:40

## 2021-06-14 RX ADMIN — POLYETHYLENE GLYCOL 3350 17 G: 17 POWDER, FOR SOLUTION ORAL at 08:40

## 2021-06-14 RX ADMIN — Medication 1 TABLET: at 08:40

## 2021-06-14 RX ADMIN — LOSARTAN POTASSIUM 100 MG: 50 TABLET, FILM COATED ORAL at 05:40

## 2021-06-14 RX ADMIN — FOLIC ACID 1 MG: 1 TABLET ORAL at 08:40

## 2021-06-14 NOTE — CONSULTS
UROLOGY CONSULTATION NOTE     Patient Identifiers: Barry Charles (MRN [id number])  Service Requesting Consultation: Portia Romo MD  Service Providing Consultation:  Urology, RUSTY Bowman    Date of Service: 6/14/2021  Inpatient consult to Urology  Consult performed by: RUTSY Bowman  Consult ordered by: Efrain Bonner PA-C          Reason for Consultation: UTI    ASSESSMENT:     63 y o  old male with  history of adenocarcinoma of the prostate, status post RALP 6/7 with development of urine leak and postprocedure fever  CT negative for obstruction, new fluid collection or additional postsurgical complication  Positive bladder wall thickening consistent with cystitis  PLAN:   Continue medical optimization and antibiosis  Discharge per primary Internal Medicine service with antimicrobial regimen for at least an additional 5 days  Patient is scheduled for office visit with Dr Talita Basurto 6/17 to review pathology report and to determine if optimized for surgical drain removal   Fortunately, patient looks well and do not anticipate long protracted hospital stay  He will follow-up with patient as scheduled  History of Present Illness:     Barry Charles is a pleasant 70-year-old male with adenocarcinoma of the prostate, status post robotic assisted laparoscopic radical prostatectomy 6/7 by Dr Talita Basurto  He developed postoperative urine leak and was discharged 06/09 with both Calixto catheter and MAXWELL drain in place and pending follow-up 6/17  Patient reports feeling well and in his normal state of health when he developed low-grade temperature at home  Patient's sister is a nurse into temperature which was reportedly 100 7  She directed her brother to go to the emergency room at [facility]  He was evaluated and admitted given recent procedure and temperatures  He is seen in his room 434  He is alert, oriented afebrile and hemodynamically stable    He denies flank, abdominal or suprapubic pain  Calixto catheter and Fco-Cardona drain remain in-situ and patent for light yellow urine with slight our inch tinge  No evidence of gross hematuria or clot formation  Review of labs demonstrates normal WBC count in serum creatinine  Hemoglobin is 9 8  Preliminary blood cultures demonstrate absence of growth within the 1st 24 hours  Not had a urinalysis which was positive for innumerable RBCs  However, WBCs for 4-10 per high-powered field  No bacteria seen  There was no evidence of lactic acidosis or elevated procalcitonin levels  Urologic consultation requested against patient's significant  background for further management recommendations      Past Medical, Past Surgical History:     Past Medical History:   Diagnosis Date    Hypertension    :    Past Surgical History:   Procedure Laterality Date    COLONOSCOPY      CT LAP,PROSTATECTOMY,RADICAL,W/NERVE SPARE,INCL ROBOTIC N/A 6/7/2021    Procedure: ROBOTIC LAPAROSCOPIC RADICAL PROSTATECTOMY AND PELVIC LYMPH NODE DISSECTION;  Surgeon: Pascale Davidson MD;  Location: [facility];  Service: Urology   :    Medications, Allergies:     Current Facility-Administered Medications   Medication Dose Route Frequency    acetaminophen (TYLENOL) tablet 975 mg  975 mg Oral Q6H PRN    aluminum-magnesium hydroxide-simethicone (MYLANTA) oral suspension 30 mL  30 mL Oral Q6H PRN    amLODIPine (NORVASC) tablet 5 mg  5 mg Oral Early Morning    cefTRIAXone (ROCEPHIN) 1,000 mg in dextrose 5 % 50 mL IVPB  1,000 mg Intravenous Q24H    docusate sodium (COLACE) capsule 100 mg  100 mg Oral BID    fluticasone (FLONASE) 50 mcg/act nasal spray 1 spray  1 spray Each Nare Daily PRN    folic acid (FOLVITE) tablet 1 mg  1 mg Oral Daily    hydrochlorothiazide (HYDRODIURIL) tablet 25 mg  25 mg Oral Early Morning    losartan (COZAAR) tablet 100 mg  100 mg Oral Early Morning    melatonin tablet 6 mg  6 mg Oral HS    multivitamin-minerals (CENTRUM) tablet 1 tablet  1 tablet Oral Daily    ondansetron (ZOFRAN) injection 4 mg  4 mg Intravenous Q6H PRN    oxyCODONE (ROXICODONE) IR tablet 5 mg  5 mg Oral Q4H PRN    polyethylene glycol (MIRALAX) packet 17 g  17 g Oral Daily    thiamine tablet 100 mg  100 mg Oral Daily       Allergies:  No Known Allergies:    Social and Family History:   Social History:   Social History     Tobacco Use    Smoking status: Former Smoker     Quit date:      Years since quittin 4    Smokeless tobacco: Never Used   Vaping Use    Vaping Use: Never used   Substance Use Topics    Alcohol use: Yes     Alcohol/week: 7 0 - 14 0 standard drinks     Types: 7 - 14 Shots of liquor per week     Comment: 1-2 shots of PIPPA nightly    Drug use: Not Currently     Social History     Tobacco Use   Smoking Status Former Smoker    Quit date:     Years since quittin 4   Smokeless Tobacco Never Used       Family History:  Family History   Problem Relation Age of Onset    Hypertension Father    :     Review of Systems:     Review of Systems - History obtained from chart review and the patient  General ROS: positive for  - chills and fever  negative for - fatigue or malaise  Respiratory ROS: no cough, shortness of breath, or wheezing  Cardiovascular ROS: no chest pain or dyspnea on exertion  Gastrointestinal ROS: no abdominal pain, change in bowel habits, or black or bloody stools  Genito-Urinary ROS: no dysuria, trouble voiding, or hematuria  Neurological ROS: no TIA or stroke symptoms    All other systems queried were negative  Physical Exam:     /74   Pulse 89   Temp 98 7 °F (37 1 °C)   Resp 19   Ht 5' 9" (1 753 m)   Wt 96 2 kg (212 lb)   SpO2 99%   BMI 31 31 kg/m² Temp (24hrs), Av 7 °F (37 1 °C), Min:98 6 °F (37 °C), Max:98 9 °F (37 2 °C)  current; Temperature: 98 7 °F (37 1 °C)  I/O last 24 hours:   In: 600 [P O :600]  Out: 5 [Urine:2675; Drains:95]    General appearance: alert and oriented, in no acute distress, appears stated age, cooperative and no distress  Head: Normocephalic, without obvious abnormality, atraumatic  Neck: no adenopathy, no carotid bruit, no JVD, supple, symmetrical, trachea midline and thyroid not enlarged, symmetric, no tenderness/mass/nodules  Lungs: diminished breath sounds  Heart: regular rate and rhythm, S1, S2 normal, no murmur, click, rub or gallop  Abdomen: soft, non-tender; bowel sounds normal; no masses,  no organomegaly and Abdominal laparoscopic puncture sites with Dermabond--well-approximated without evidence of ecchymosis, necrosis, crepitus or dehiscence  Extremities: extremities normal, warm and well-perfused; no cyanosis, clubbing, or edema  Pulses: 2+ and symmetric  Neurologic: Grossly normal  MAXWELL drain--scant volumes of serous fluid with slight yellowish-on orange tinge  Calixto catheter--clear yellow  see outputs above    Labs:     Lab Results   Component Value Date    HGB 9 8 (L) 06/14/2021    HCT 30 1 (L) 06/14/2021    WBC 7 89 06/14/2021     06/14/2021   ]    Lab Results   Component Value Date    K 3 4 (L) 06/14/2021     06/14/2021    CO2 27 06/14/2021    BUN 11 06/14/2021    CREATININE 1 10 06/14/2021    CALCIUM 9 0 06/14/2021   ]    Imaging:   I personally reviewed the images and report of the following studies, and reviewed them with the patient:    CT Abdomen: See below      CT abdomen pelvis with contrast [[id number]] Collected: 06/12/21 2135   Order Status: Completed Updated: 06/12/21 2142   Narrative:     CT ABDOMEN AND PELVIS WITH IV CONTRAST     INDICATION:   Abdominal pain, fever   Abdominal pain, acute, nonlocalized   Postop fever  COMPARISON:  None  TECHNIQUE:  CT examination of the abdomen and pelvis was performed  Axial, sagittal, and coronal 2D reformatted images were created from the source data and submitted for interpretation       Radiation dose length product (DLP) for this visit:  0954 mGy-cm    This examination, like all CT scans performed in the [facility], was performed utilizing techniques to minimize radiation dose exposure, including the use of iterative   reconstruction and automated exposure control  IV Contrast:  100 mL of iohexol (OMNIPAQUE)   Enteric Contrast:  Enteric contrast was not administered  FINDINGS: Study is limited due to motion artifact  ABDOMEN     LOWER CHEST:  No clinically significant abnormality identified in the visualized lower chest      LIVER/BILIARY TREE:  Unremarkable  GALLBLADDER:  Urinary bladder is partially collapsed around a Calixto catheter   Significant inflammatory changes around the urinary bladder and urinary bladder wall thickening is seen  SPLEEN:  Unremarkable  PANCREAS:  Unremarkable  ADRENAL GLANDS:  Unremarkable  KIDNEYS/URETERS:  Unremarkable  No hydronephrosis  STOMACH AND BOWEL:  Unremarkable  APPENDIX:  No findings to suggest appendicitis  ABDOMINOPELVIC CAVITY:  Inflammatory changes and free fluid in the pelvis is seen   Drainage catheter in the right hemiabdomen is seen  VESSELS:  Unremarkable for patient's age  PELVIS     REPRODUCTIVE ORGANS:  Status post prostatectomy     URINARY BLADDER:  Unremarkable  ABDOMINAL WALL/INGUINAL REGIONS:  Fat-containing umbilical wall hernia is seen   Tiny droplets of air in the anterior abdominal wall are visualized  OSSEOUS STRUCTURES:  No acute fracture or destructive osseous lesion  Impression:       Status post prostatectomy with significant inflammatory changes and free fluid in the pelvis   Findings may represent postsurgical changes however postsurgical complication such as infection or other etiology cannot be ruled out  Thickening of the urinary bladder wall which may represent cystitis     The study was marked in EPIC for immediate notification  Workstation performed: [id number]          Thank you for allowing me to participate in this patients care    Please do not hesitate to call with any additional questions    RUSTY Judd

## 2021-06-14 NOTE — CASE MANAGEMENT
PT LOS: 2 days  PATIENT CLASS OBV  PATIENT IS A READMISSION  PATIENT IS NOT A BUNDLE  CM met with patient at bedside to complete CM open and discuss discharge planning  Patient lives in [facility]  Patient is    Patient lives with wife Rahul  Home set up is   Patient has no DME:  Patient is independent  with ADLs and Mobility  Patient states they are employed at [employer], they do drive  Patient has no BH admissions and no MH hx:   Patient has current hx of SA, Patient was a former smoker  Patients PCP is: : Ness Hernandez    and uses [facility] for pharmacy  Patient has not used [facility] is past  CM was not able to find a [facility] that accepts patient insurance  Patient does not have POA, designated HCR is his spouse Rahul  Patient's anticipated transportation home is: family    CM reviewed discharge planning process including the following: identifying caregivers at home, preference for d/c planning needs,   availability of [facility] to [facility] program, availability of treatment team to discuss questions or concerns patient and/or family may have regarding diagnosis, plan of care, old or new medications and discharge planning   CM will continue to follow for care coordination and update assessment as appropriate  Patient is a 64 yo male admitted with a post op complication: UTI and fever, s/p lap prostectomy   IV abx, Post op anemia : CBC, suprapubic area very tender to palpation, PLOF: independent, DCP: home with OP followup, Transportation: family

## 2021-06-14 NOTE — CASE MANAGEMENT
RECENT ADMISSION: 6/7/2021 - 6/9/2021 (2 days)  Vic    AGE:55  SEX:male  DX: lap prostectomy  OUTCOME:  RESOURCES ON D/C (previous admission):   CURRENT F/U APPTS:   6/14/2021  [address] [place] [address] Urology On license of [facility] for  possible voiding trial vs cystogram    REASON FOR READMISSION: Post op complication, UTI, Fever   Acute blood loss anemia: Hbg 12 1 at discharge and 9 7 today  INTERVENTIONS: Patient was already  Admitted in Observation status at review

## 2021-06-14 NOTE — DISCHARGE SUMMARY
[address] [facility]  Discharge- [facility] February 1965, 63 y o  male MRN: [id number]  Unit/Bed#: [facility]-01 Encounter: [id number]  Primary Care Provider: Kristy Sanches MD   Date and time admitted to hospital: 6/12/2021  7:24 PM    Discharge Diagnosis:    1  Complicated UTI in the setting of Calixto catheter, Calixto catheter present prior to admission  2  Status post recent prostatectomy  3  Obesity  4  Hypokalemia  5  Anemia  6  Alcohol dependence  7  Hypertension    Discharging Physician / Practitioner: Bettina Arita MD  PCP: Kristy Sanches MD  Admission Date:   Admission Orders (From admission, onward)     Ordered        06/12/21 2230  Place in Observation  Once                   Discharge Date: 06/14/21    Consultations During Hospital Stay:  · Urology    Significant Findings / Test Results:   CT abdomen pelvis with contrast [[id number]] Collected: 06/12/21 2135   Order Status: Completed Updated: 06/12/21 2142   Narrative:     CT ABDOMEN AND PELVIS WITH IV CONTRAST     INDICATION:   Abdominal pain, fever   Abdominal pain, acute, nonlocalized   Postop fever  COMPARISON:  None  TECHNIQUE:  CT examination of the abdomen and pelvis was performed  Axial, sagittal, and coronal 2D reformatted images were created from the source data and submitted for interpretation  Radiation dose length product (DLP) for this visit: [id number] mGy-cm    This examination, like all CT scans performed in the [facility], was performed utilizing techniques to minimize radiation dose exposure, including the use of iterative   reconstruction and automated exposure control  IV Contrast:  100 mL of iohexol (OMNIPAQUE)   Enteric Contrast:  Enteric contrast was not administered  FINDINGS: Study is limited due to motion artifact  ABDOMEN     LOWER CHEST:  No clinically significant abnormality identified in the visualized lower chest      LIVER/BILIARY TREE:  Unremarkable       GALLBLADDER:  Urinary bladder is partially collapsed around a Calixto catheter   Significant inflammatory changes around the urinary bladder and urinary bladder wall thickening is seen  SPLEEN:  Unremarkable  PANCREAS:  Unremarkable  ADRENAL GLANDS:  Unremarkable  KIDNEYS/URETERS:  Unremarkable  No hydronephrosis  STOMACH AND BOWEL:  Unremarkable  APPENDIX:  No findings to suggest appendicitis  ABDOMINOPELVIC CAVITY:  Inflammatory changes and free fluid in the pelvis is seen   Drainage catheter in the right hemiabdomen is seen  VESSELS:  Unremarkable for patient's age  PELVIS     REPRODUCTIVE ORGANS:  Status post prostatectomy     URINARY BLADDER:  Unremarkable  ABDOMINAL WALL/INGUINAL REGIONS:  Fat-containing umbilical wall hernia is seen   Tiny droplets of air in the anterior abdominal wall are visualized  OSSEOUS STRUCTURES:  No acute fracture or destructive osseous lesion  Impression:       Status post prostatectomy with significant inflammatory changes and free fluid in the pelvis   Findings may represent postsurgical changes however postsurgical complication such as infection or other etiology cannot be ruled out  Thickening of the urinary bladder wall which may represent cystitis     The study was marked in EPIC for immediate notification  Outpatient Follow up Requested:  · Urology    Complications:  None    Reason for Admission:       HPI:  Marques Rodriguez February is a 63 y o  male who presents with Reports fever x 2 days  Tmax 100 7 at home  POD #5 lap prostatectomy  Hospital Course:     Patient was hospitalized given the above  His symptomatology is consistent with a urinary tract infection  He was treated the ceftriaxone IV while in the hospital and tolerated well  Urology evaluated patient regarding CT findings above, no acute intervention was recommended    They did recommend discharging patient on Calixto catheter, and follow-up at the office visit with   Bethel Jordan 6/17 to review pathology report and to determine if optimized for surgical drain removal   He is being discharged in a course of cefpodoxime, afebrile, in stable condition  Condition at Discharge: good     Discharge Day Visit / Exam:     Subjective:    Patient evaluated this morning  Afebrile, nontoxic  Feeling overall better although no 100% back to baseline yet  Complains of minor suprapubic pain  No other events reported  Vitals: Blood Pressure: 137/74 (06/14/21 0802)  Pulse: 89 (06/14/21 0802)  Temperature: 98 7 °F (37 1 °C) (06/14/21 0802)  Temp Source: Oral (06/12/21 1914)  Respirations: 19 (06/14/21 0802)  Height: 5' 9" (175 3 cm) (06/12/21 1914)  Weight - Scale: 96 2 kg (212 lb) (06/12/21 1914)  SpO2: 99 % (06/14/21 0802)  Exam:   Physical Exam  Vitals and nursing note reviewed  Constitutional:       Appearance: Normal appearance  Comments: Middle-aged male in bed, awake   HENT:      Head: Normocephalic and atraumatic  Right Ear: External ear normal       Left Ear: External ear normal       Nose: Nose normal  No congestion or rhinorrhea  Mouth/Throat:      Mouth: Mucous membranes are moist       Pharynx: Oropharynx is clear  No oropharyngeal exudate or posterior oropharyngeal erythema  Eyes:      General: No scleral icterus  Right eye: No discharge  Left eye: No discharge  Pupils: Pupils are equal, round, and reactive to light  Neck:      Vascular: No carotid bruit  Cardiovascular:      Rate and Rhythm: Normal rate and regular rhythm  Pulses: Normal pulses  Heart sounds: No murmur heard  No friction rub  No gallop  Pulmonary:      Effort: Pulmonary effort is normal  No respiratory distress  Breath sounds: Normal breath sounds  No stridor  No wheezing, rhonchi or rales  Abdominal:      General: Abdomen is flat  Bowel sounds are normal  There is no distension  Palpations: Abdomen is soft  There is no mass  Tenderness: There is no abdominal tenderness  There is no guarding or rebound  Hernia: No hernia is present  Genitourinary:     Comments: Calixto catheter in place  Musculoskeletal:         General: No swelling, tenderness, deformity or signs of injury  Normal range of motion  Cervical back: Normal range of motion  No rigidity  No muscular tenderness  Lymphadenopathy:      Cervical: No cervical adenopathy  Skin:     General: Skin is warm and dry  Capillary Refill: Capillary refill takes less than 2 seconds  Coloration: Skin is not jaundiced or pale  Findings: No bruising or erythema  Neurological:      General: No focal deficit present  Mental Status: He is alert and oriented to person, place, and time  Mental status is at baseline  Cranial Nerves: No cranial nerve deficit  Sensory: No sensory deficit  Motor: No weakness  Coordination: Coordination normal       Deep Tendon Reflexes: Reflexes normal    Psychiatric:         Mood and Affect: Mood normal          Behavior: Behavior normal          Thought Content: Thought content normal          Judgment: Judgment normal          Discussion with Family:  Patient    Discharge instructions/Information to patient and family:   See after visit summary for information provided to patient and family  Provisions for Follow-Up Care:  See after visit summary for information related to follow-up care and any pertinent home health orders  Disposition:     Home    For Discharges to [facility]:   · Not Applicable to this Patient - Not Applicable to this Patient    Planned Readmission: No     Discharge Statement:  I spent 35 minutes discharging the patient  This time was spent on the day of discharge  I had direct contact with the patient on the day of discharge   Greater than 50% of the total time was spent examining patient, answering all patient questions, arranging and discussing plan of care with patient as well as directly providing post-discharge instructions  Additional time then spent on discharge activities  Discharge Medications:  See after visit summary for reconciled discharge medications provided to patient and family        ** Please Note: This note has been constructed using a voice recognition system **

## 2021-06-14 NOTE — CASE MANAGEMENT
[facility] to inquire if they accept [facility]  Call from [address] that patient will be discharged home today   No needs

## 2021-06-14 NOTE — TELEPHONE ENCOUNTER
Patient called in requesting if second disability form has been completed and ready to be picked up   Patient can be reached at [phone]

## 2021-06-15 NOTE — TELEPHONE ENCOUNTER
Patients wife HR department calling to clarify dates on la paperwork  They are trying to see if wife needs off for just June or July    Please clarify with [address] [phone]

## 2021-06-15 NOTE — UTILIZATION REVIEW
Notification of Discharge   This is a Notification of Discharge from our facility [address]  Please be advised that this patient has been discharge from our facility  Below you will find the admission and discharge date and time including the patients disposition  UTILIZATION REVIEW CONTACT:  America Hoff  Utilization   Network Utilization Review Department  Phone: [phone] x carefully listen to the prompts  All voicemails are confidential   Email: [email or web address]     PHYSICIAN ADVISORY SERVICES:  FOR GYTH-BJ-OKVM REVIEW - MEDICAL NECESSITY DENIAL  Phone: [phone]  Fax: [phone]  Email: [email or web address]     PRESENTATION DATE: 6/12/2021  7:24 PM  OBERVATION ADMISSION DATE: 06/12/2021  INPATIENT ADMISSION DATE: N/A N/A   DISCHARGE DATE: 6/14/2021  5:07 PM  DISPOSITION: Home/Self Care Home/Self Care      IMPORTANT INFORMATION:  Send all requests for admission clinical reviews, approved or denied determinations and any other requests to dedicated fax number below belonging to the campus where the patient is receiving treatment   List of dedicated fax numbers:  [address] [place] [address] DENIALS (Administrative/Medical Necessity) [phone]   [address] (Maternity/NICU/Pediatrics) [phone] [address] [facility] [phone]   [facility] Marin [phone]   [facility] [phone]   [facility] [address] [facility] [phone]   [facility]  [phone]   [address] [facility] [address]  [address] [facility] [address] [facility] [phone]

## 2021-06-16 NOTE — TELEPHONE ENCOUNTER
Daniela from [address] is calling regarding [facility] paperwork  She wants to know dates but there are two sets of dates  Needs clarification on return to work date  Would like to verify these dates  Provided cell number [phone]

## 2021-06-16 NOTE — PATIENT INSTRUCTIONS
Prostate Cancer   WHAT YOU NEED TO KNOW:   What do I need to know about prostate cancer? The prostate is the male sex gland that helps make semen  It is about the size of a walnut and wraps around the urethra  The urethra is the tube that carries urine from the bladder to the end of the penis  In most cases, prostate cancer is slow growing  What increases my risk for prostate cancer? · Age older than 48 years    · Father or brother with prostate cancer    · Regularly eating fried or high-fat foods    · Eating few fruits and vegetables    · Exposure to high amounts of certain chemicals, such as in cigarette smoke or alkaline batteries    · A sexually transmitted infection (STI)    What are the signs and symptoms of prostate cancer? You may have no symptoms during the early stages  In the later stages, you may have any of the following:  · Trouble starting or stopping the flow of urine    · Feeling the need to urinate often, especially at night    · Pain or a burning feeling when you urinate or ejaculate semen    · Trouble having an erection    · Blood in your urine or semen    · Not being able to urinate at all    · Pain or stiffness in your lower back, hips, or upper thighs    How is prostate cancer diagnosed? · Digital rectal examination (MEKHI)  is a test to check the size and shape of your prostate  Your healthcare provider will insert a gloved finger into your rectum to feel if your prostate is large, firm, or has lumps  · Prostate-specific antigen (PSA)  is a blood test to check PSA levels  These levels may be increased if you have prostate cancer  · A biopsy  is used to take a sample of your prostate gland to be tested for cancer  The sample may also help healthcare providers determine the stage of your cancer  How is prostate cancer treated? If you have early stage cancer, your healthcare provider may recommend that you have frequent tests and regular follow-up visits to watch for changes  You may also need any of the following:  · Hormone therapy  is medicine used to decrease testosterone (male hormone) levels  · Radiation therapy  is used to kill cancer cells with high-energy x-ray beams  You may receive radiation therapy from outside your body or from small beads or rods placed inside your prostate  · Surgery  may be needed, depending on the stage of the cancer  Part or all of your prostate may be removed  You may also need to have some lymph nodes taken out  This may help keep the cancer from spreading to other parts of your body  Your healthcare provider may recommend a combination of radiation therapy and surgery  What can I do to manage my prostate cancer? · Do not smoke  Nicotine can damage blood vessels and make it more difficult to manage your prostate cancer  Smoking also increases your risk for new or returning cancer and delays healing after treatment  Do not use e-cigarettes or smokeless tobacco in place of cigarettes or to help you quit  They still contain nicotine  Ask your healthcare provider for information if you currently smoke and need help quitting  · Limit or do not drink alcohol as directed  A drink is 12 ounces of beer, 1½ ounces of liquor, or 5 ounces of wine  · Eat a variety of healthy foods  Healthy foods include fruits, vegetables, whole-grain breads, low-fat dairy products, beans, lean meats, and fish  Your healthcare provider may also recommend changes to the amounts of calcium and vitamin D you have each day  · Manage your weight  Obesity may increase your risk for problems from prostate cancer  Limit or do not have high-calorie foods or drinks  · Exercise as directed  Exercise may help you recover after treatment and may help prevent your prostate cancer from returning  Exercise can also help you manage your weight  Try to get at least 30 minutes of exercise 5 days a week, such as walking  · Ask about sexual activity    Ask your healthcare provider when it is safe for you to start having sex after your treatment  Medicines may be given if you have trouble getting or maintaining an erection  · Manage incontinence  You may have incontinence (trouble controlling when you urinate) after treatment  Ask your healthcare provider for information on managing urinary incontinence  You may be able to gain control over your urination with techniques or medicines  · Drink liquids as directed  Ask how much liquid to drink each day and which liquids are best for you  Drink extra liquids to prevent dehydration  You will also need to replace fluid if you are vomiting or have diarrhea from cancer treatments  Call your local emergency number (911 in the [address]) if:   · Your leg feels warm, tender, and painful  It may look swollen and red  · You suddenly feel lightheaded and short of breath  · You have chest pain when you take a deep breath or cough  · You cough up blood  When should I call my doctor? · You have a fever  · You feel you cannot cope with your illness  · You have blood in your urine or have trouble urinating  · You have pain that does not get better after you take your medicine  · You have questions or concerns about your condition or care  CARE AGREEMENT:   You have the right to help plan your care  Learn about your health condition and how it may be treated  Discuss treatment options with your healthcare providers to decide what care you want to receive  You always have the right to refuse treatment  The above information is an  only  It is not intended as medical advice for individual conditions or treatments  Talk to your doctor, nurse or pharmacist before following any medical regimen to see if it is safe and effective for you  © Copyright [address] Information is for End User's use only and may not be sold, redistributed or otherwise used for commercial purposes   All illustrations and images included in CareNotes® are the copyrighted property of A D A M [employer]  or Cordell Mcneill

## 2021-06-16 NOTE — TELEPHONE ENCOUNTER
Paperwork completed, signed by Dr MAK, and scanned into chart    Pt called - will p/u at window 4, [facility] office

## 2021-06-16 NOTE — PROGRESS NOTES
Problem List Items Addressed This Visit        Genitourinary    Prostate cancer [facility]) - Primary    Relevant Orders    PSA Total, Diagnostic            Discussion:     Chato Tabares has been doing well, some low-grade temperatures often on to around 99 or 100 degrees, otherwise without any issues, he did have a urine leak after surgery, his drain output has been low, less than 100 milliliters per day, I am comfortable with this given its position within the peritoneal cavity  Urine is clear at this time, I did review use a paraphimosis on him today  Reviewed with him his pathology which has down stage him to Sondra 3+4=7 disease from a previous diagnosis of Sondra 8 in 1 of his cores  He was happy to hear this news  Margins are negative  He will return in 1 week for Calixto removal and trial of void  We will check his PSA in 4 weeks with a return visit with me in 6 weeks, all parties are hopeful that this will be a undetectable PSA given his pathology review  He did have an admission to [facility] for some temperature fluctuations, CT scan at that time did not show any ureteral injury and showed no large volume contrast extravasation from the bladder, further supporting the decision for drain removal today  Assessment and plan:       Please see problem oriented charting for the assessment plan of today's urological complaints    Shawanda Ramos MD      Chief Complaint     Chief Complaint   Patient presents with    Follow-up     pain scale 2         History of Present Illness     Ruslan Nunez February is a 63 y o  Gentleman status post radical prostatectomy, complicated by a leak, treated with prolonged drainage, drainage has been less than 100 per day, drain was removed today  Reviewed with him his pathology, reassuring findings, negative margins, down staged to Sondra 3+4=7        The following portions of the patient's history were reviewed and updated as appropriate: allergies, current medications, past family history, past medical history, past social history, past surgical history and problem list     Detailed Urologic History     - please refer to HPI    Review of Systems     Review of Systems   Constitutional: Negative  HENT: Negative  Eyes: Negative  Respiratory: Negative  Cardiovascular: Negative  Gastrointestinal: Negative  Endocrine: Negative  Genitourinary: Negative  Musculoskeletal: Negative  Skin: Negative  Allergic/Immunologic: Negative  Neurological: Negative  Hematological: Negative  Psychiatric/Behavioral: Negative  Allergies     No Known Allergies    Physical Exam     Physical Exam  Vitals reviewed  Constitutional:       General: He is not in acute distress  Appearance: Normal appearance  He is not ill-appearing, toxic-appearing or diaphoretic  HENT:      Head: Normocephalic and atraumatic  Eyes:      General: No scleral icterus  Right eye: No discharge  Left eye: No discharge  Cardiovascular:      Pulses: Normal pulses  Pulmonary:      Effort: Pulmonary effort is normal    Abdominal:      General: There is no distension  Palpations: There is no mass  Tenderness: There is no abdominal tenderness  Hernia: No hernia is present  Genitourinary:     Comments: Calixto in place, draining clear yellow urine, paraphimosis reduced by me without difficulty  Musculoskeletal:         General: No swelling or tenderness  Skin:     General: Skin is warm  Neurological:      General: No focal deficit present  Mental Status: He is alert and oriented to person, place, and time  Cranial Nerves: No cranial nerve deficit  Psychiatric:         Mood and Affect: Mood normal          Behavior: Behavior normal          Thought Content:  Thought content normal          Judgment: Judgment normal              Vital Signs  Vitals:    06/17/21 0916   BP: 124/72   Pulse: 76   Weight: 96 8 kg (213 lb 6 4 oz)   Height: 5' 9" (1 753 m)         Current Medications       Current Outpatient Medications:     amLODIPine (NORVASC) 5 mg tablet, Take 5 mg by mouth daily in the early morning , Disp: , Rfl:     cefpodoxime (VANTIN) 100 mg tablet, Take 1 tablet (100 mg total) by mouth 2 (two) times a day for 5 days, Disp: 10 tablet, Rfl: 0    docusate sodium (COLACE) 100 mg capsule, Take 1 capsule (100 mg total) by mouth 3 (three) times a day for 14 days, Disp: 42 capsule, Rfl: 0    esomeprazole (NexIUM) 40 MG capsule, as needed, Disp: , Rfl:     fluticasone (FLONASE) 50 mcg/act nasal spray, fluticasone propionate 50 mcg/actuation nasal spray,suspension  one puff each nostril daily prn , Disp: , Rfl:     hydrochlorothiazide (HYDRODIURIL) 25 mg tablet, Take 25 mg by mouth daily in the early morning , Disp: , Rfl:     losartan (COZAAR) 100 MG tablet, Take 100 mg by mouth daily in the early morning , Disp: , Rfl:     multivitamin (THERAGRAN) TABS, Take 1 tablet by mouth daily, Disp: , Rfl:     polyethylene glycol (GLYCOLAX) 17 GM/SCOOP powder, polyethylene glycol 3350 17 gram oral powder packet, Disp: , Rfl:     potassium chloride (MICRO-K) 10 MEQ CR capsule, daily, Disp: , Rfl:     polyethylene glycol (MIRALAX) 17 g packet, Take 17 g by mouth daily for 7 days, Disp: 119 g, Rfl: 0      Active Problems     Patient Active Problem List   Diagnosis    Elevated PSA, less than 10 ng/ml    Prostate cancer (HCC)    Post-operative complication    Acute cystitis with hematuria    Essential hypertension    Hypokalemia    EtOH dependence (HCC)    Acute blood loss anemia         Past Medical History     Past Medical History:   Diagnosis Date    Hypertension          Surgical History     Past Surgical History:   Procedure Laterality Date    COLONOSCOPY      ME LAP,PROSTATECTOMY,RADICAL,W/NERVE SPARE,INCL ROBOTIC N/A 6/7/2021    Procedure: ROBOTIC LAPAROSCOPIC RADICAL PROSTATECTOMY AND PELVIC LYMPH NODE DISSECTION;  Surgeon: Lisseth Miner MD;  [facility];  Service: Urology         Family History     Family History   Problem Relation Age of Onset    Hypertension Father          Social History     Social History     Social History     Tobacco Use   Smoking Status Former Smoker    Quit date:     Years since quittin 4   Smokeless Tobacco Never Used         Pertinent Lab Values     Lab Results   Component Value Date    CREATININE 1 10 2021       Lab Results   Component Value Date    PSA 4 7 (H) 2021       PROSTATE GLAND: Radical Prostatectomy  8th Edition - Protocol posted: 2020  PROSTATE GLAND: RADICAL PROSTATECTOMY - All Specimens  SPECIMEN   Procedure  Radical prostatectomy    TUMOR   Histologic Type  Acinar adenocarcinoma    Histologic Grade     Grade Group and Sondra Score  Grade group 2 (Partridge Score 3 + 4 = 7)    Percentage of Pattern 4  40 %   Extraprostatic Extension (EPE)  Not identified    Urinary Bladder Neck Invasion  Not identified    Seminal Vesicle Invasion  Not identified    Treatment Effect  No known presurgical therapy    Perineural Invasion  Present    MARGINS   Margins  Uninvolved by invasive carcinoma    LYMPH NODES   Number of Lymph Nodes Involved  0    Number of Lymph Nodes Examined  6    PATHOLOGIC STAGE CLASSIFICATION (pTNM, AJCC 8th Edition)      Primary Tumor (pT)  pT2    Regional Lymph Nodes (pN)  pN0            Pertinent Imaging       The patient's images were reviewed by me personally and also in real time with them in the examination room using our PACS imaging system  The imaging findings are significant for   No hydronephrosis, no contrast extravasation

## 2021-06-16 NOTE — TELEPHONE ENCOUNTER
SHERI Rick in HR clarifying wife was to be off 2 weeks postoperatively for patient and that she may return on 6/21   Also that forms will be faxed over as of today

## 2021-06-16 NOTE — TELEPHONE ENCOUNTER
Forms were sent yesterday for signature from Dr Clark  Pls confirm if FMLA has been sent yet   Thank you

## 2021-06-17 ENCOUNTER — OFFICE VISIT (OUTPATIENT)
Dept: UROLOGY | Facility: CLINIC | Age: 56
End: 2021-06-17

## 2021-06-17 VITALS
DIASTOLIC BLOOD PRESSURE: 72 MMHG | WEIGHT: 213.4 LBS | BODY MASS INDEX: 31.61 KG/M2 | HEART RATE: 76 BPM | HEIGHT: 69 IN | SYSTOLIC BLOOD PRESSURE: 124 MMHG

## 2021-06-17 DIAGNOSIS — C61 PROSTATE CANCER (HCC): Primary | ICD-10-CM

## 2021-06-17 LAB
BACTERIA BLD CULT: NORMAL
BACTERIA BLD CULT: NORMAL

## 2021-06-17 PROCEDURE — 99024 POSTOP FOLLOW-UP VISIT: CPT | Performed by: UROLOGY

## 2021-06-17 RX ORDER — POLYETHYLENE GLYCOL 3350 17 G/17G
POWDER, FOR SOLUTION ORAL
COMMUNITY

## 2021-06-17 RX ORDER — ESOMEPRAZOLE MAGNESIUM 40 MG/1
CAPSULE, DELAYED RELEASE ORAL AS NEEDED
COMMUNITY

## 2021-06-17 RX ORDER — POTASSIUM CHLORIDE 750 MG/1
CAPSULE, EXTENDED RELEASE ORAL DAILY
COMMUNITY
Start: 2021-06-16

## 2021-06-17 NOTE — TELEPHONE ENCOUNTER
LM advising that once again forms have been faxed and it states she can return to work on 6/21 2 wks postoperative from patients date of surgery on 6/7  Pls confirm this with her if she calls back FMLA forms are in media   Thank you

## 2021-06-18 NOTE — UTILIZATION REVIEW
Notification of Discharge   This is a Notification of Discharge from our facility [address]  Please be advised that this patient has been discharge from our facility  Below you will find the admission and discharge date and time including the patients disposition  UTILIZATION REVIEW CONTACT:  Xavier Chino  Utilization   Network Utilization Review Department  Phone: [phone] x carefully listen to the prompts  All voicemails are confidential   Email: [email or web address]     PHYSICIAN ADVISORY SERVICES:  FOR [id number] REVIEW - MEDICAL NECESSITY DENIAL  Phone: [phone]  Fax: [phone]  Email: [email or web address]     PRESENTATION DATE: 6/7/2021  6:37 AM  OBERVATION ADMISSION DATE:   INPATIENT ADMISSION DATE: 6/8/21  6:05 PM   DISCHARGE DATE: 6/9/2021  6:18 PM  DISPOSITION: Home/Self Care Home/Self Care      IMPORTANT INFORMATION:  Send all requests for admission clinical reviews, approved or denied determinations and any other requests to dedicated fax number below belonging to the campus where the patient is receiving treatment   List of dedicated fax numbers:  [address] DENIALS (Administrative/Medical Necessity) [phone]   [address] (Maternity/NICU/Pediatrics) [phone]   Michaela Rodrigues [phone]   Bobby Funk [phone]   Gail Campbell [phone]   [address] [place] [address] [address] [place] [address] [phone]   [facility]  [phone]   [address] [facility] [address]  [address] [facility] [address] [facility] [phone]

## 2021-06-21 NOTE — TELEPHONE ENCOUNTER
Patient called stating he was seen 06/17 by Dr Chetan Grover and he stated he is having a lot of burning and pain and he is having some white stuff coming out into the catheter   He stated he is getting his catheter removed on Thursday but he wants to know how to proceed

## 2021-06-21 NOTE — TELEPHONE ENCOUNTER
Can repeat urine testing and proceed with nursing visit on Thursday as scheduled  Recommend continued proper hydration and avoidance of constipation

## 2021-06-21 NOTE — TELEPHONE ENCOUNTER
Returning phone call to patient of Dr Thea Medel seen in our [facility] office  Was seen in the ED and admitted on 6/12/21 for a UTI   Was given ceftriaxone IV x1 and discharged on 6/14/21 with Cefpodoxime 100mg bid for 5 days  Patient states he is have "white puss" coming out from tip of his penis where the catheter is   It started yesterday to be consistent  Has finished up his antibiotic  Catheter is draining okay now  Urine is clear pale yellow in the bag  Patient state is hydrating  Denies fevers or chills  Has taken tylenol for pain which helps  Has a RN visit scheduled for 6/24/21 in our [facility]  for candelario removal   Follow up provider visit with Khanh Zimmerman PA-C on 7/29/21  Encouraged continued hydration with patient 40-60oz per practice protocol  Use of OTC tylenol for pain  Advised to contact our office if he has fever about 101 , unable to void,hematuria,pain unrelieved with otc pain medication  ED precautions reviewed   Patient verbalized understanding  Will send to provider for further recommendations

## 2021-06-22 ENCOUNTER — APPOINTMENT (OUTPATIENT)
Dept: LAB | Facility: HOSPITAL | Age: 56
End: 2021-06-22
Payer: COMMERCIAL

## 2021-06-22 DIAGNOSIS — N39.0 URINARY TRACT INFECTION WITHOUT HEMATURIA, SITE UNSPECIFIED: ICD-10-CM

## 2021-06-22 LAB
BACTERIA UR QL AUTO: ABNORMAL /HPF
BILIRUB UR QL STRIP: NEGATIVE
CLARITY UR: ABNORMAL
COLOR UR: YELLOW
GLUCOSE UR STRIP-MCNC: NEGATIVE MG/DL
HGB UR QL STRIP.AUTO: ABNORMAL
KETONES UR STRIP-MCNC: NEGATIVE MG/DL
LEUKOCYTE ESTERASE UR QL STRIP: ABNORMAL
NITRITE UR QL STRIP: NEGATIVE
NON-SQ EPI CELLS URNS QL MICRO: ABNORMAL /HPF
PH UR STRIP.AUTO: 5.5 [PH]
PROT UR STRIP-MCNC: ABNORMAL MG/DL
RBC #/AREA URNS AUTO: ABNORMAL /HPF
SP GR UR STRIP.AUTO: 1.01 (ref 1–1.03)
UROBILINOGEN UR QL STRIP.AUTO: 0.2 E.U./DL
WBC #/AREA URNS AUTO: ABNORMAL /HPF

## 2021-06-22 PROCEDURE — 87077 CULTURE AEROBIC IDENTIFY: CPT

## 2021-06-22 PROCEDURE — 81001 URINALYSIS AUTO W/SCOPE: CPT

## 2021-06-22 PROCEDURE — 87186 SC STD MICRODIL/AGAR DIL: CPT

## 2021-06-22 PROCEDURE — 87086 URINE CULTURE/COLONY COUNT: CPT

## 2021-06-24 ENCOUNTER — PROCEDURE VISIT (OUTPATIENT)
Dept: UROLOGY | Facility: CLINIC | Age: 56
End: 2021-06-24
Payer: COMMERCIAL

## 2021-06-24 VITALS
HEIGHT: 69 IN | DIASTOLIC BLOOD PRESSURE: 82 MMHG | HEART RATE: 82 BPM | WEIGHT: 210 LBS | SYSTOLIC BLOOD PRESSURE: 156 MMHG | BODY MASS INDEX: 31.1 KG/M2

## 2021-06-24 DIAGNOSIS — N39.0 URINARY TRACT INFECTION WITHOUT HEMATURIA, SITE UNSPECIFIED: Primary | ICD-10-CM

## 2021-06-24 DIAGNOSIS — C61 PROSTATE CANCER (HCC): Primary | ICD-10-CM

## 2021-06-24 LAB
BACTERIA UR CULT: ABNORMAL
POST-VOID RESIDUAL VOLUME, ML POC: 43 ML

## 2021-06-24 PROCEDURE — 51798 US URINE CAPACITY MEASURE: CPT

## 2021-06-24 RX ORDER — SULFAMETHOXAZOLE AND TRIMETHOPRIM 800; 160 MG/1; MG/1
1 TABLET ORAL EVERY 12 HOURS SCHEDULED
Qty: 14 TABLET | Refills: 0 | Status: SHIPPED | OUTPATIENT
Start: 2021-06-24 | End: 2021-07-01

## 2021-06-24 NOTE — PROGRESS NOTES
6/24/2021    Fry February  1965  [id number]    Diagnosis  Chief Complaint     Prostate Cancer          Patient presents for candelario catheter removal and post void residual managed by Dr Christensen Row is to follow up as scheduled for post void residual this afternoon  Procedure Candelario removal/voiding trial    Candelario catheter removed after deflation of an intact balloon  Patient tolerated well  Encouraged patient to hydrate well and return this afternoon for post void residual   he knows he may return early if uncomfortable and unable to urinate  Patient agrees to this plan  Patient returned this afternoon  Patient states able to void  Patient voided while in office  Bladder ultrasound performed and PVR measured 43ml  Patient further states he is having troubles leaking from the urethra  Provided education on kegal exercises and provided information packet       Recent Results (from the past 4 hour(s))   POCT Measure PVR    Collection Time: 06/24/21  3:26 PM   Result Value Ref Range    POST-VOID RESIDUAL VOLUME, ML POC 43 mL           Vitals:    06/24/21 0910   BP: 156/82   BP Location: Right arm   Patient Position: Sitting   Cuff Size: Standard   Pulse: 82   Weight: 95 3 kg (210 lb)   Height: 5' 9" (1 753 m)           Elizabeth Short RN

## 2021-06-28 ENCOUNTER — TELEPHONE (OUTPATIENT)
Dept: UROLOGY | Facility: MEDICAL CENTER | Age: 56
End: 2021-06-28

## 2021-06-28 NOTE — LETTER
July 9, 2021     Patient: Burke Charles   YOB: 1965   Date of Visit: 6/28/2021       To Whom it May Concern:    Burke Charles is under my professional care  He was seen in my office on 6/7/21  He may return to work without restrictions on 7/29/21     If you have any questions or concerns, please don't hesitate to call           Sincerely,          Mari Farr MD         CC: No Recipients

## 2021-06-28 NOTE — LETTER
June 07, 2021     Patient: Yassine Charles   YOB: 1965   Date of Visit: 06/07/2021       To Whom it May Concern:    Yassine Ayala February is under my professional care  He was seen in my office on 6/07/2021  He may return to work on 07/19/2021  If you have any questions or concerns, please don't hesitate to call           Sincerely,          Chetan Lopez      CC: No Recipients

## 2021-06-28 NOTE — TELEPHONE ENCOUNTER
Patient of Dr Alexa Tripp seen in [facility]  Patient calling for work note  Patient needs note from 06/07 to whatever date he said  Patient believes it should be 6 weeks  Patient did get fmla paperwork but job is requesting this letter  Patient requesting letter to be sent to  [email or web address]  com

## 2021-07-06 ENCOUNTER — TELEPHONE (OUTPATIENT)
Dept: UROLOGY | Facility: MEDICAL CENTER | Age: 56
End: 2021-07-06

## 2021-07-06 ENCOUNTER — APPOINTMENT (OUTPATIENT)
Dept: LAB | Facility: HOSPITAL | Age: 56
End: 2021-07-06
Payer: COMMERCIAL

## 2021-07-06 DIAGNOSIS — N39.0 URINARY TRACT INFECTION WITHOUT HEMATURIA, SITE UNSPECIFIED: ICD-10-CM

## 2021-07-06 DIAGNOSIS — N39.0 URINARY TRACT INFECTION WITHOUT HEMATURIA, SITE UNSPECIFIED: Primary | ICD-10-CM

## 2021-07-06 LAB
BACTERIA UR QL AUTO: ABNORMAL /HPF
BILIRUB UR QL STRIP: NEGATIVE
CLARITY UR: CLEAR
COLOR UR: YELLOW
GLUCOSE UR STRIP-MCNC: NEGATIVE MG/DL
HGB UR QL STRIP.AUTO: ABNORMAL
KETONES UR STRIP-MCNC: NEGATIVE MG/DL
LEUKOCYTE ESTERASE UR QL STRIP: NEGATIVE
NITRITE UR QL STRIP: NEGATIVE
NON-SQ EPI CELLS URNS QL MICRO: ABNORMAL /HPF
PH UR STRIP.AUTO: 5.5 [PH]
PROT UR STRIP-MCNC: NEGATIVE MG/DL
RBC #/AREA URNS AUTO: ABNORMAL /HPF
SP GR UR STRIP.AUTO: 1.02 (ref 1–1.03)
UROBILINOGEN UR QL STRIP.AUTO: 0.2 E.U./DL
WBC #/AREA URNS AUTO: ABNORMAL /HPF

## 2021-07-06 PROCEDURE — 87186 SC STD MICRODIL/AGAR DIL: CPT

## 2021-07-06 PROCEDURE — 87077 CULTURE AEROBIC IDENTIFY: CPT

## 2021-07-06 PROCEDURE — 81001 URINALYSIS AUTO W/SCOPE: CPT

## 2021-07-06 PROCEDURE — 87086 URINE CULTURE/COLONY COUNT: CPT

## 2021-07-06 NOTE — TELEPHONE ENCOUNTER
Patient managed by Dr Katt Escobar at the [facility] office  He was last seen in the office on 06/17/2021 for Prostate Cancer  Patient called with complaints of Dysuria  Patient denies gross hematuria  Patient denies urinary urgency or frequency  Patient does experience some urinary hesitancy  Patient is having urinary leakage when sneezing or coughing  Denies any fevers, nausea or vomiting  Orders placed for UA C&S to rule out urinary tract infection  Office will follow up as results become available usually within 48-72 hours  Patient encouraged to hydrate well with water and avoid bladder irritants  Patient instructed to call back with worsening symptoms, fever, chills, blood in the urine or difficulty urinating

## 2021-07-06 NOTE — TELEPHONE ENCOUNTER
Pt managed by Lucy Solorzano completed course Bactrim he is still experiencing urinary burning please contact him with recommendation

## 2021-07-08 NOTE — TELEPHONE ENCOUNTER
Patient called in inquiring lab results from 7/6/21   Patient stated he is still having symptoms and asked if the doctor will prescribe some medication patient can be reached at [phone]

## 2021-07-08 NOTE — TELEPHONE ENCOUNTER
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

## 2021-07-09 NOTE — TELEPHONE ENCOUNTER
Returned call to patient regarding return to work note  Per patient, would like updated note to be sent to email as below  Note updated and emailed to patient  There is also another additional encounter in patient's chart, patient called a few days ago with urinary symptoms and UA and culture done  Culture does show gram positive cocci, no susceptibility  Patient reports he called a few days ago when he saw the results and is upset that he has not gotten a call back in regards to treatment  Contacted lab to inquire if susceptibility could be obtained  Will also direct to provider about treatment in the meantime

## 2021-07-11 LAB — BACTERIA UR CULT: ABNORMAL

## 2021-07-12 RX ORDER — LEVOFLOXACIN 500 MG/1
500 TABLET, FILM COATED ORAL EVERY 24 HOURS
Qty: 7 TABLET | Refills: 0 | Status: SHIPPED | OUTPATIENT
Start: 2021-07-12 | End: 2021-07-19

## 2021-07-12 NOTE — TELEPHONE ENCOUNTER
Received results of final culture and sensitivity  Antibiotic switched to Levaquin sent to his pharmacy  He can stop Keflex

## 2021-07-28 ENCOUNTER — APPOINTMENT (OUTPATIENT)
Dept: LAB | Facility: HOSPITAL | Age: 56
End: 2021-07-28
Attending: UROLOGY
Payer: COMMERCIAL

## 2021-07-28 DIAGNOSIS — C61 PROSTATE CANCER (HCC): ICD-10-CM

## 2021-07-28 LAB — PSA SERPL-MCNC: <0.1 NG/ML (ref 0–4)

## 2021-07-28 PROCEDURE — 84153 ASSAY OF PSA TOTAL: CPT

## 2021-07-28 NOTE — PROGRESS NOTES
7/29/2021      Chief Complaint   Patient presents with    Prostate Cancer     Assessment and Plan    1  [place] 3+4=7 prostate cancer  - S/p RALP on 6/7/21 with Dr Kobe Banegas  - PSA from 7/28/21 is undetectable at <0 1   - Continue Kegel exercises to promote return of bladder control  - Recommend PDE-5 inhibitors for penile rehabilitation  Rx for Sildenafil 20 mg qd sent to patient's pharmacy  - Work note provided x1 month  - F/u in 3 months with PSA    2  Tinea cruris   - Rx for nystatin powder provided    History of Present Illness  Viktoria Carrasco February is a 63 y o  male here for follow up evaluation of Sondra 7 prostate cancer status post robotic laparoscopic radical prostatectomy and pelvic lymph node dissection on 06/07/2021 with Dr Kobe Banegas  This was complicated by leak treated with prolonged drainage  CT scan did not show any ureteral injury and showed no large volume contrast extravasation from the bladder and drain was removed at previous office visit on 6/17/21  Patient also did experience urinary tract infection following surgery  He does report some continued dysuria as well as suprapubic pain and pressure  Elleycelissa Floresa He denies any other urinary complaints  He does admit to leakage and currently uses pads  He continues with daily Kegel exercises  Patient also complains of groin itching  Urine dip negative for leukocytes, blood, or nitrites  Review of Systems   Constitutional: Negative for chills and fever  Respiratory: Negative for shortness of breath  Cardiovascular: Negative for chest pain  Gastrointestinal: Negative for abdominal pain, constipation, diarrhea, nausea and vomiting  Genitourinary: Positive for dysuria  Negative for difficulty urinating, flank pain, frequency, hematuria and urgency  Neurological: Negative for dizziness         Past Medical History  Past Medical History:   Diagnosis Date    Hypertension     Prostate cancer [facility])        Past Social History  Past Surgical History:   Procedure Laterality Date    COLONOSCOPY      [place] LAP,PROSTATECTOMY,RADICAL,W/NERVE SPARE,INCL ROBOTIC N/A 2021    Procedure: ROBOTIC LAPAROSCOPIC RADICAL PROSTATECTOMY AND PELVIC LYMPH NODE DISSECTION;  Surgeon: Vanessa Sawyer MD;  [facility];  Service: Urology     Social History     Tobacco Use   Smoking Status Former Smoker    Quit date:     Years since quittin 5   Smokeless Tobacco Never Used       Past Family History  Family History   Problem Relation Age of Onset    Hypertension Father        Past Social history  Social History     Socioeconomic History    Marital status: /Civil Union     Spouse name: Not on file    Number of children: Not on file    Years of education: Not on file    Highest education level: Not on file   Occupational History    Not on file   Tobacco Use    Smoking status: Former Smoker     Quit date:      Years since quittin 5    Smokeless tobacco: Never Used   Vaping Use    Vaping Use: Never used   Substance and Sexual Activity    Alcohol use: Yes     Alcohol/week: 7 0 - 14 0 standard drinks     Types: 7 - 14 Shots of liquor per week     Comment: 1-2 shots of PIPPA nightly    Drug use: Not Currently    Sexual activity: Not on file   Other Topics Concern    Not on file   Social History Narrative    Not on file     Social Determinants of Health     Financial Resource Strain:     Difficulty of Paying Living Expenses:    Food Insecurity:     Worried About Running Out of Food in the Last Year:     [address] in the Last Year:    Transportation Needs:     Lack of Transportation (Medical):      Lack of Transportation (Non-Medical):    Physical Activity:     Days of Exercise per Week:     Minutes of Exercise per Session:    Stress:     Feeling of Stress :    Social Connections:     Frequency of Communication with Friends and Family:     Frequency of Social Gatherings with Friends and Family:     Attends [employer] Services:     Active Member of Clubs or Organizations:     Attends Club or Organization Meetings:     Marital Status:    Intimate Partner Violence:     Fear of Current or Ex-Partner:     Emotionally Abused:     Physically Abused:     Sexually Abused:        Current Medications  Current Outpatient Medications   Medication Sig Dispense Refill    amLODIPine (NORVASC) 5 mg tablet Take 5 mg by mouth daily in the early morning       esomeprazole (NexIUM) 40 MG capsule as needed      fluticasone (FLONASE) 50 mcg/act nasal spray fluticasone propionate 50 mcg/actuation nasal spray,suspension   one puff each nostril daily prn   hydrochlorothiazide (HYDRODIURIL) 25 mg tablet Take 25 mg by mouth daily in the early morning       losartan (COZAAR) 100 MG tablet Take 100 mg by mouth daily in the early morning       multivitamin (THERAGRAN) TABS Take 1 tablet by mouth daily      docusate sodium (COLACE) 100 mg capsule Take 1 capsule (100 mg total) by mouth 3 (three) times a day for 14 days 42 capsule 0    nystatin (MYCOSTATIN) powder Apply topically 3 (three) times a day 15 g 0    polyethylene glycol (GLYCOLAX) 17 GM/SCOOP powder polyethylene glycol 3350 17 gram oral powder packet (Patient not taking: Reported on 7/29/2021)      polyethylene glycol (MIRALAX) 17 g packet Take 17 g by mouth daily for 7 days 119 g 0    potassium chloride (MICRO-K) 10 MEQ CR capsule daily (Patient not taking: Reported on 7/29/2021)      sildenafil (REVATIO) 20 mg tablet Take 1 tablet (20 mg total) by mouth daily 30 tablet 2     No current facility-administered medications for this visit         Allergies  No Known Allergies      The following portions of the patient's history were reviewed and updated as appropriate: allergies, current medications, past medical history, past social history, past surgical history and problem list       Vitals  Vitals:    07/29/21 0825   BP: 114/78   Pulse: 64   Weight: 94 9 kg (209 lb 3 2 oz) Height: 5' 9" (1 753 m)           Physical Exam  Physical Exam  Constitutional:       Appearance: Normal appearance  HENT:      Head: Normocephalic and atraumatic  Right Ear: External ear normal       Left Ear: External ear normal    Eyes:      General: No scleral icterus  Conjunctiva/sclera: Conjunctivae normal    Cardiovascular:      Pulses: Normal pulses  Pulmonary:      Effort: Pulmonary effort is normal    Genitourinary:     Comments: Some mild erythema and irritation noted in groin region consistent with tinea cruris   Musculoskeletal:         General: Normal range of motion  Cervical back: Normal range of motion  Neurological:      General: No focal deficit present  Mental Status: He is alert and oriented to person, place, and time  Psychiatric:         Mood and Affect: Mood normal          Behavior: Behavior normal          Thought Content:  Thought content normal          Judgment: Judgment normal            Results  No results found for this or any previous visit (from the past 1 hour(s)) ]  Lab Results   Component Value Date    PSA <0 1 07/28/2021    PSA 4 7 (H) 02/04/2021     Lab Results   Component Value Date    CALCIUM 9 0 06/14/2021    K 3 4 (L) 06/14/2021    CO2 27 06/14/2021     06/14/2021    BUN 11 06/14/2021    CREATININE 1 10 06/14/2021     Lab Results   Component Value Date    WBC 7 89 06/14/2021    HGB 9 8 (L) 06/14/2021    HCT 30 1 (L) 06/14/2021    MCV 99 (H) 06/14/2021     06/14/2021           Orders  Orders Placed This Encounter   Procedures    PSA Total, Diagnostic     Standing Status:   Future     Standing Expiration Date:   7/29/2022    POCT urine dip       Nina Arizmendi PA-C

## 2021-07-29 ENCOUNTER — OFFICE VISIT (OUTPATIENT)
Dept: UROLOGY | Facility: CLINIC | Age: 56
End: 2021-07-29
Payer: COMMERCIAL

## 2021-07-29 VITALS
DIASTOLIC BLOOD PRESSURE: 78 MMHG | HEIGHT: 69 IN | WEIGHT: 209.2 LBS | HEART RATE: 64 BPM | SYSTOLIC BLOOD PRESSURE: 114 MMHG | BODY MASS INDEX: 30.98 KG/M2

## 2021-07-29 DIAGNOSIS — B35.6 JOCK ITCH: ICD-10-CM

## 2021-07-29 DIAGNOSIS — N52.31 ERECTILE DYSFUNCTION AFTER RADICAL PROSTATECTOMY: ICD-10-CM

## 2021-07-29 DIAGNOSIS — C61 PROSTATE CANCER (HCC): Primary | ICD-10-CM

## 2021-07-29 LAB
SL AMB  POCT GLUCOSE, UA: NORMAL
SL AMB LEUKOCYTE ESTERASE,UA: NORMAL
SL AMB POCT BILIRUBIN,UA: NORMAL
SL AMB POCT BLOOD,UA: NORMAL
SL AMB POCT CLARITY,UA: CLEAR
SL AMB POCT COLOR,UA: NORMAL
SL AMB POCT KETONES,UA: NORMAL
SL AMB POCT NITRITE,UA: NORMAL
SL AMB POCT PH,UA: 6.5
SL AMB POCT SPECIFIC GRAVITY,UA: 1.02
SL AMB POCT URINE PROTEIN: NORMAL
SL AMB POCT UROBILINOGEN: 0.2

## 2021-07-29 PROCEDURE — 99024 POSTOP FOLLOW-UP VISIT: CPT | Performed by: PHYSICIAN ASSISTANT

## 2021-07-29 PROCEDURE — 81002 URINALYSIS NONAUTO W/O SCOPE: CPT | Performed by: PHYSICIAN ASSISTANT

## 2021-07-29 RX ORDER — NYSTATIN 100000 [USP'U]/G
POWDER TOPICAL 3 TIMES DAILY
Qty: 15 G | Refills: 0 | Status: SHIPPED | OUTPATIENT
Start: 2021-07-29 | End: 2021-10-13

## 2021-07-29 RX ORDER — SILDENAFIL CITRATE 20 MG/1
20 TABLET ORAL DAILY
Qty: 30 TABLET | Refills: 2 | Status: SHIPPED | OUTPATIENT
Start: 2021-07-29 | End: 2021-08-05 | Stop reason: CLARIF

## 2021-07-29 NOTE — LETTER
July 29, 2021     Patient: Toño Charles   YOB: 1965   Date of Visit: 7/29/2021       To Whom it May Concern:    Toño Charles is under my professional care  He was seen in my office on 7/29/2021  He may return to work on 8/29/21 without restrictions  If you have any questions or concerns, please don't hesitate to call           Sincerely,          Garcia Zapata PA-C

## 2021-07-30 ENCOUNTER — TELEPHONE (OUTPATIENT)
Dept: UROLOGY | Facility: AMBULATORY SURGERY CENTER | Age: 56
End: 2021-07-30

## 2021-07-30 DIAGNOSIS — N52.31 ERECTILE DYSFUNCTION AFTER RADICAL PROSTATECTOMY: Primary | ICD-10-CM

## 2021-07-30 NOTE — TELEPHONE ENCOUNTER
Patient is calling to say his union sent over letter for additional  documentation to be sent to them in order to complete the extension for work  Scan in chart

## 2021-08-02 NOTE — TELEPHONE ENCOUNTER
Patient called in inquiring when his paper work from [employer] will be filled and sent out  Patient stated he needs this information completed as soon as possible because it is for his income  Document was scanned into his chart on 7/30/21   Patient can be reached at [phone]

## 2021-08-03 NOTE — TELEPHONE ENCOUNTER
Patient called in asking how long does it take to get a paper signed and sent back  Patient stated he needs this completed as soon as possible and asking to speak with the AP   Patient can be reached at [phone]

## 2021-08-03 NOTE — TELEPHONE ENCOUNTER
7/29/21-8/29/21, return on 8/29/21   Diagnosis: Prostate cancer status post radical prostatectomy on 6/7/21   Initial diagnosis date: 3/4/21   Medical reason: post-operative healing

## 2021-08-03 NOTE — TELEPHONE ENCOUNTER
I was not told that this needed to be filled out nor informed of this  I am unfamiliar with filling these forms out  Is there any way I can be provided with a template in order to complete this appropriately?

## 2021-08-04 NOTE — TELEPHONE ENCOUNTER
Patient states that his [facility] and they need additional verification - asked if they stated a prior authorization and he said no they said the insurance was paying for it , but they need additional information -tried calling [facility] but did not get a representee   Could you assist with this ? RBC 4.50 4.1 - 5.3 M/CU MM    Hemoglobin 14.1 12.0 - 15.0 GM/DL    Hematocrit 41.1 35 - 45 %    MCV 91.3 78 - 95 FL    MCH 31.3 26 - 32 PG    MCHC 34.3 32.0 - 36.0 %    RDW 11.8 11.7 - 14.9 %    Platelets 342 975 - 517 K/CU MM    MPV 10.3 7.5 - 11.1 FL    Differential Type AUTOMATED DIFFERENTIAL     Segs Relative 70.6 (H) 34 - 64 %    Lymphocytes % 20.5 (L) 25 - 45 %    Monocytes % 6.8 (H) 0 - 5 %    Eosinophils % 1.6 0 - 3 %    Basophils % 0.4 0 - 1 %    Segs Absolute 5.2 K/CU MM    Lymphocytes Absolute 1.5 K/CU MM    Monocytes Absolute 0.5 K/CU MM    Eosinophils Absolute 0.1 K/CU MM    Basophils Absolute 0.0 K/CU MM    Nucleated RBC % 0.0 %    Total Nucleated RBC 0.0 K/CU MM    Total Immature Neutrophil 0.01 K/CU MM    Immature Neutrophil % 0.1 0 - 0.43 %   CMP   Result Value Ref Range    Sodium 139 138 - 145 MMOL/L    Potassium 4.1 3.5 - 5.1 MMOL/L    Chloride 105 99 - 110 mMol/L    CO2 28 21 - 32 MMOL/L    BUN 11 6 - 23 MG/DL    CREATININE 0.7 0.6 - 1.1 MG/DL    Glucose 101 (H) 70 - 99 MG/DL    Calcium 8.7 8.3 - 10.6 MG/DL    Albumin 4.2 3.4 - 5.0 GM/DL    Total Protein 6.5 6.4 - 8.2 GM/DL    Total Bilirubin 0.2 0.0 - 1.0 MG/DL    ALT 16 10 - 40 U/L    AST 19 15 - 37 IU/L    Alkaline Phosphatase 99 37 - 287 IU/L    Anion Gap 6 4 - 16   HCG Serum, Quantitative   Result Value Ref Range    hCG Quant 0.5 UIU/ML   Urinalysis   Result Value Ref Range    Color, UA YELLOW YELLOW    Clarity, UA HAZY (A) CLEAR    Glucose, Urine NEGATIVE NEGATIVE MG/DL    Bilirubin Urine NEGATIVE NEGATIVE MG/DL    Ketones, Urine NEGATIVE NEGATIVE MG/DL    Specific Gravity, UA 1.014 1.001 - 1.035    Blood, Urine NEGATIVE NEGATIVE    pH, Urine 7.0 5.0 - 8.0    Protein, UA NEGATIVE NEGATIVE MG/DL    Urobilinogen, Urine NEGATIVE 0.2 - 1.0 MG/DL    Nitrite Urine, Quantitative NEGATIVE NEGATIVE    Leukocyte Esterase, Urine TRACE (A) NEGATIVE    RBC, UA <1 0 - 6 /HPF    WBC, UA 1 0 - 5 /HPF    Bacteria, UA NEGATIVE NEGATIVE /HPF    Squam Epithel, UA 7 /HPF    Mucus, UA RARE (A) NEGATIVE HPF    Trichomonas, UA NONE SEEN NONE SEEN /HPF       ED Course and MDM   -  Patient seen and evaluated in the emergency department. -  Triage and nursing notes reviewed and incorporated. -  Old chart records reviewed and incorporated. -  Supervising physician was Dr. Semaj Salazar. Patient was seen independently. -  Work-up included:  See above  -  Patient had labs done in triage. Her serum quant is exactly 0.5. We discussed that this inconclusive at this time--could be a negative result vs very early pregnancy. Abdominal exam is benign. We discussed need for repeat quant in 48 hours, which she is agreeable to. Referred to OBGYN or return here as needed. She is agreeable with plan of care and disposition.  -  Disposition:  Home    In light of current events, I did utilize appropriate PPE (including N95 and surgical face mask, safety glasses, and gloves, as recommended by the health facility/national standard best practice, during my bedside interactions with the patient. Final Impression      1. Amenorrhea    2. Encounter for pregnancy test, result unknown    3.  Nausea and vomiting, intractability of vomiting not specified, unspecified vomiting type          DISPOSITION Decision To Discharge 04/26/2021 09:41:05 PM      [address], [address]  04/27/21 0617

## 2021-08-04 NOTE — TELEPHONE ENCOUNTER
Patient called to give number for [address] thru [facility] said to contact them to find out exactly what information is needed from physician to fill prescription for Sildenafil  [phone] ext [phone]  He would like someone to contact them directly

## 2021-08-04 NOTE — TELEPHONE ENCOUNTER
Letter completed , [facility] signed - faxed to [address] [place] [address]  [phone]  Patient notified -

## 2021-08-05 RX ORDER — SILDENAFIL CITRATE 20 MG/1
20 TABLET ORAL DAILY
Qty: 90 TABLET | Refills: 1 | Status: SHIPPED | OUTPATIENT
Start: 2021-08-05

## 2021-08-05 NOTE — TELEPHONE ENCOUNTER
Drug not covered for the purpose in which we are intending to use it  Script at [address] was discontinued as an error  New prescription for Sildenafil 20mg 1 PO QD #90 with 1 refill was requeued  We discussed cost-effective pricing and various pharmacy vendors  [facility] recommended for best cash pay pricing  Cost of #90 count supply of Sildenafil 20mg to [facility] is $19 23  [facility]una coupon was included on the prescription    Prescription was forwarded back to the original ordering provider for approval

## 2021-08-16 ENCOUNTER — APPOINTMENT (OUTPATIENT)
Dept: LAB | Facility: HOSPITAL | Age: 56
End: 2021-08-16
Payer: COMMERCIAL

## 2021-08-16 ENCOUNTER — TELEPHONE (OUTPATIENT)
Dept: UROLOGY | Facility: MEDICAL CENTER | Age: 56
End: 2021-08-16

## 2021-08-16 DIAGNOSIS — C61 PROSTATE CANCER (HCC): ICD-10-CM

## 2021-08-16 DIAGNOSIS — C61 PROSTATE CANCER (HCC): Primary | ICD-10-CM

## 2021-08-16 LAB
BACTERIA UR QL AUTO: ABNORMAL /HPF
BILIRUB UR QL STRIP: NEGATIVE
CLARITY UR: CLEAR
COLOR UR: YELLOW
GLUCOSE UR STRIP-MCNC: NEGATIVE MG/DL
HGB UR QL STRIP.AUTO: NEGATIVE
KETONES UR STRIP-MCNC: NEGATIVE MG/DL
LEUKOCYTE ESTERASE UR QL STRIP: NEGATIVE
NITRITE UR QL STRIP: NEGATIVE
NON-SQ EPI CELLS URNS QL MICRO: ABNORMAL /HPF
PH UR STRIP.AUTO: 6 [PH]
PROT UR STRIP-MCNC: NEGATIVE MG/DL
RBC #/AREA URNS AUTO: ABNORMAL /HPF
SP GR UR STRIP.AUTO: 1.02 (ref 1–1.03)
UROBILINOGEN UR QL STRIP.AUTO: 0.2 E.U./DL
WBC #/AREA URNS AUTO: ABNORMAL /HPF

## 2021-08-16 PROCEDURE — 87086 URINE CULTURE/COLONY COUNT: CPT

## 2021-08-16 PROCEDURE — 81001 URINALYSIS AUTO W/SCOPE: CPT

## 2021-08-16 NOTE — TELEPHONE ENCOUNTER
Patient managed by Dr Ankita Ramsey at the [facility] office  He has a history of Prostate Ca  Patient called with complaints of burning, nocturia, feelings of incomplete emptying  Orders placed for UA C&S to rule out urinary tract infection  Office will follow up as results become available usually within 48-72 hours  Patient encouraged to hydrate well with water and avoid bladder irritants  Patient instructed to call back with worsening symptoms, fever, chills, blood in the urine or difficulty urinating  Patient further states he would like to speak to [facility]  Made him aware she is not available at this time but will be routed in message  Patient asking when she will be back  Made aware I cannot give schedules out to patients  Nghia Villagran states he will keep calling back until he gets to speak to a doctor or a PA

## 2021-08-16 NOTE — TELEPHONE ENCOUNTER
Patient seen by Belén Navarro at [facility]    Patient called stating he is having symptoms of a uti  He is having burning and pain when urinating  He also mentioned he is forcing the urine to come out  He wants to know how to proceed  He would like to speak to Raissa Caballero       Patient can be reached at  [phone]

## 2021-08-16 NOTE — TELEPHONE ENCOUNTER
Agree with recommendations for urine testing  Further discussion can occur after urine testing completed if warranted  Unsure why this warrants call back from provider at this time

## 2021-08-17 LAB — BACTERIA UR CULT: NORMAL

## 2021-08-18 ENCOUNTER — TELEPHONE (OUTPATIENT)
Dept: UROLOGY | Facility: CLINIC | Age: 56
End: 2021-08-18

## 2021-08-18 DIAGNOSIS — R35.0 URINARY FREQUENCY: ICD-10-CM

## 2021-08-18 DIAGNOSIS — C61 PROSTATE CANCER (HCC): Primary | ICD-10-CM

## 2021-08-18 RX ORDER — OXYBUTYNIN CHLORIDE 5 MG/1
5 TABLET, EXTENDED RELEASE ORAL DAILY
Qty: 30 TABLET | Refills: 2 | Status: SHIPPED | OUTPATIENT
Start: 2021-08-18 | End: 2021-11-15

## 2021-08-18 NOTE — TELEPHONE ENCOUNTER
Darek Tran with patient, he is wondering why he is having these symptoms? He was adamant on speaking with you

## 2021-08-18 NOTE — TELEPHONE ENCOUNTER
----- Message from Marley Orozco PA-C sent at 8/18/2021  7:52 AM EDT -----  Please let Isacc Zuniga know his urine testing is negative for infection  Thank you!

## 2021-08-18 NOTE — TELEPHONE ENCOUNTER
Spoke with patient regarding current symptoms  He is experiencing increased dysuria and urinary urgency  I have prescribed oxybutynin for the symptoms  Additionally I would recommend patient return for cystoscopy to rule out potential stricture  Please assist patient with scheduling cystoscopy

## 2021-08-23 NOTE — TELEPHONE ENCOUNTER
Called patient and let him know that we were waiting on the new provider schedule to open up and we will call him to schedule a cysto once we have the schedule

## 2021-08-25 NOTE — TELEPHONE ENCOUNTER
Katelyn Montalvo from [facility] [address] [place] [address] called and advised that sildenafil 20mg needs a prior authorization  He provided Steele: [id number]  Katelyn Montalvo can be reached at [phone]  Please advise

## 2021-08-25 NOTE — TELEPHONE ENCOUNTER
I returned the call to [facility] and informed them that the script for this drug was DISCONTINUED back on 8/5/21  Script was reissued to his local retail pharmacy and the patient will pay cash for this drug  No further action required

## 2021-08-26 ENCOUNTER — TELEPHONE (OUTPATIENT)
Dept: UROLOGY | Facility: AMBULATORY SURGERY CENTER | Age: 56
End: 2021-08-26

## 2021-08-26 NOTE — TELEPHONE ENCOUNTER
Patient returned to call, patient was in a very bad area could not hear   Advised patient to call back

## 2021-08-26 NOTE — TELEPHONE ENCOUNTER
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

## 2021-08-26 NOTE — TELEPHONE ENCOUNTER
Patient called and advised that his union faxed his paperwork to [phone]  I requested patient to fax it to [phone]  Once this is received will have it scanned for the doctor to fill out  [id number]

## 2021-08-31 NOTE — TELEPHONE ENCOUNTER
So patient does not need to have a form saying he is disabled   The letter just needs to include a better reason why he was out - he will not get paid for Any of his time off or be able to go back to work unless they receive a revised letter - so we can use the same letter , but just add a better explanation of why he was out - they said Post operative healing wasn't good enough   the rest is fine

## 2021-08-31 NOTE — TELEPHONE ENCOUNTER
The letter specifically said that it needs to state that patient is disabled  I spoke with my supervising physician and typically we do not provide patient's to be out of work for more than 6 weeks  He should be able to go back to work at this time

## 2021-09-21 ENCOUNTER — APPOINTMENT (OUTPATIENT)
Dept: LAB | Facility: CLINIC | Age: 56
End: 2021-09-21
Payer: COMMERCIAL

## 2021-09-21 DIAGNOSIS — G93.3 PVFS (POSTVIRAL FATIGUE SYNDROME): ICD-10-CM

## 2021-09-21 DIAGNOSIS — M12.9 ARTHROPATHY: ICD-10-CM

## 2021-09-21 DIAGNOSIS — E21.3 HYPERPARATHYROIDISM, UNSPECIFIED (HCC): ICD-10-CM

## 2021-09-21 DIAGNOSIS — R53.81 MALAISE: ICD-10-CM

## 2021-09-21 DIAGNOSIS — E64.9 SEQUELA OF NUTRITIONAL DEFICIENCY: ICD-10-CM

## 2021-09-21 DIAGNOSIS — E63.9 NUTRITIONAL DEFICIENCY: ICD-10-CM

## 2021-09-21 DIAGNOSIS — R73.09 OTHER ABNORMAL GLUCOSE: ICD-10-CM

## 2021-09-21 DIAGNOSIS — Z13.220 SCREENING CHOLESTEROL LEVEL: ICD-10-CM

## 2021-09-21 DIAGNOSIS — R53.83 OTHER FATIGUE: ICD-10-CM

## 2021-09-21 DIAGNOSIS — R53.1 WEAKNESS GENERALIZED: ICD-10-CM

## 2021-09-21 DIAGNOSIS — R73.9 HYPERGLYCEMIA: ICD-10-CM

## 2021-09-21 DIAGNOSIS — G60.9 IDIOPATHIC PERIPHERAL NEUROPATHY: ICD-10-CM

## 2021-09-21 DIAGNOSIS — R53.0 NEOPLASTIC MALIGNANT RELATED FATIGUE: ICD-10-CM

## 2021-09-21 DIAGNOSIS — E61.9 DEFICIENCY OF NUTRIENT ELEMENT: ICD-10-CM

## 2021-09-22 ENCOUNTER — APPOINTMENT (OUTPATIENT)
Dept: LAB | Facility: HOSPITAL | Age: 56
End: 2021-09-22
Attending: PSYCHIATRY & NEUROLOGY
Payer: COMMERCIAL

## 2021-09-22 DIAGNOSIS — R53.83 OTHER FATIGUE: Primary | ICD-10-CM

## 2021-09-22 DIAGNOSIS — E64.9 SEQUELA OF NUTRITIONAL DEFICIENCY: ICD-10-CM

## 2021-09-22 DIAGNOSIS — R53.1 WEAKNESS GENERALIZED: ICD-10-CM

## 2021-09-22 DIAGNOSIS — R53.81 MALAISE: ICD-10-CM

## 2021-09-22 DIAGNOSIS — R73.9 HYPERGLYCEMIA: ICD-10-CM

## 2021-09-22 DIAGNOSIS — E61.9 DEFICIENCY OF NUTRIENT ELEMENT: ICD-10-CM

## 2021-09-22 DIAGNOSIS — E63.9 NUTRITIONAL DEFICIENCY: ICD-10-CM

## 2021-09-22 DIAGNOSIS — Z13.220 SCREENING CHOLESTEROL LEVEL: ICD-10-CM

## 2021-09-22 DIAGNOSIS — R73.09 OTHER ABNORMAL GLUCOSE: ICD-10-CM

## 2021-09-22 DIAGNOSIS — E21.3 HYPERPARATHYROIDISM, UNSPECIFIED (HCC): ICD-10-CM

## 2021-09-22 DIAGNOSIS — G93.3 PVFS (POSTVIRAL FATIGUE SYNDROME): ICD-10-CM

## 2021-09-22 DIAGNOSIS — G60.9 IDIOPATHIC PERIPHERAL NEUROPATHY: ICD-10-CM

## 2021-09-22 DIAGNOSIS — R53.0 NEOPLASTIC MALIGNANT RELATED FATIGUE: ICD-10-CM

## 2021-09-22 DIAGNOSIS — M12.9 ARTHROPATHY: ICD-10-CM

## 2021-09-22 LAB
25(OH)D3 SERPL-MCNC: 30.2 NG/ML (ref 30–100)
ALBUMIN SERPL BCP-MCNC: 4 G/DL (ref 3.5–5)
ALP SERPL-CCNC: 87 U/L (ref 46–116)
ALT SERPL W P-5'-P-CCNC: 42 U/L (ref 12–78)
ANION GAP SERPL CALCULATED.3IONS-SCNC: 12 MMOL/L (ref 4–13)
AST SERPL W P-5'-P-CCNC: 25 U/L (ref 5–45)
BASOPHILS # BLD AUTO: 0.03 THOUSANDS/ΜL (ref 0–0.1)
BASOPHILS NFR BLD AUTO: 1 % (ref 0–1)
BILIRUB DIRECT SERPL-MCNC: 0.19 MG/DL (ref 0–0.2)
BILIRUB SERPL-MCNC: 0.86 MG/DL (ref 0.2–1)
BUN SERPL-MCNC: 14 MG/DL (ref 5–25)
CALCIUM SERPL-MCNC: 9.1 MG/DL (ref 8.3–10.1)
CHLORIDE SERPL-SCNC: 102 MMOL/L (ref 100–108)
CO2 SERPL-SCNC: 26 MMOL/L (ref 21–32)
CREAT SERPL-MCNC: 1.24 MG/DL (ref 0.6–1.3)
CRP SERPL QL: <3 MG/L
EOSINOPHIL # BLD AUTO: 0.21 THOUSAND/ΜL (ref 0–0.61)
EOSINOPHIL NFR BLD AUTO: 6 % (ref 0–6)
ERYTHROCYTE [DISTWIDTH] IN BLOOD BY AUTOMATED COUNT: 12.3 % (ref 11.6–15.1)
ERYTHROCYTE [SEDIMENTATION RATE] IN BLOOD: 38 MM/HOUR (ref 0–19)
GFR SERPL CREATININE-BSD FRML MDRD: 75 ML/MIN/1.73SQ M
GLUCOSE P FAST SERPL-MCNC: 98 MG/DL (ref 65–99)
HCT VFR BLD AUTO: 40.7 % (ref 36.5–49.3)
HGB BLD-MCNC: 13.5 G/DL (ref 12–17)
IMM GRANULOCYTES # BLD AUTO: 0.01 THOUSAND/UL (ref 0–0.2)
IMM GRANULOCYTES NFR BLD AUTO: 0 % (ref 0–2)
LYMPHOCYTES # BLD AUTO: 1.37 THOUSANDS/ΜL (ref 0.6–4.47)
LYMPHOCYTES NFR BLD AUTO: 41 % (ref 14–44)
MAGNESIUM SERPL-MCNC: 2.2 MG/DL (ref 1.6–2.6)
MCH RBC QN AUTO: 31.3 PG (ref 26.8–34.3)
MCHC RBC AUTO-ENTMCNC: 33.2 G/DL (ref 31.4–37.4)
MCV RBC AUTO: 94 FL (ref 82–98)
MONOCYTES # BLD AUTO: 0.27 THOUSAND/ΜL (ref 0.17–1.22)
MONOCYTES NFR BLD AUTO: 8 % (ref 4–12)
NEUTROPHILS # BLD AUTO: 1.45 THOUSANDS/ΜL (ref 1.85–7.62)
NEUTS SEG NFR BLD AUTO: 44 % (ref 43–75)
NRBC BLD AUTO-RTO: 0 /100 WBCS
PHOSPHATE SERPL-MCNC: 3.4 MG/DL (ref 2.7–4.5)
PLATELET # BLD AUTO: 158 THOUSANDS/UL (ref 149–390)
PMV BLD AUTO: 12 FL (ref 8.9–12.7)
POTASSIUM SERPL-SCNC: 3.5 MMOL/L (ref 3.5–5.3)
PROT SERPL-MCNC: 8.5 G/DL (ref 6.4–8.2)
PTH-INTACT SERPL-MCNC: 62.7 PG/ML (ref 18.4–80.1)
RBC # BLD AUTO: 4.32 MILLION/UL (ref 3.88–5.62)
RHEUMATOID FACT SER QL LA: NEGATIVE
SODIUM SERPL-SCNC: 140 MMOL/L (ref 136–145)
TSH SERPL DL<=0.05 MIU/L-ACNC: 2.97 UIU/ML (ref 0.36–3.74)
VIT B12 SERPL-MCNC: 624 PG/ML (ref 100–900)
WBC # BLD AUTO: 3.34 THOUSAND/UL (ref 4.31–10.16)

## 2021-09-22 PROCEDURE — 86157 COLD AGGLUTININ TITER: CPT

## 2021-09-22 PROCEDURE — 84443 ASSAY THYROID STIM HORMONE: CPT

## 2021-09-22 PROCEDURE — 80051 ELECTROLYTE PANEL: CPT

## 2021-09-22 PROCEDURE — 82565 ASSAY OF CREATININE: CPT

## 2021-09-22 PROCEDURE — 86430 RHEUMATOID FACTOR TEST QUAL: CPT

## 2021-09-22 PROCEDURE — 83655 ASSAY OF LEAD: CPT

## 2021-09-22 PROCEDURE — 82595 ASSAY OF CRYOGLOBULIN: CPT

## 2021-09-22 PROCEDURE — 82306 VITAMIN D 25 HYDROXY: CPT

## 2021-09-22 PROCEDURE — 83735 ASSAY OF MAGNESIUM: CPT

## 2021-09-22 PROCEDURE — 86618 LYME DISEASE ANTIBODY: CPT

## 2021-09-22 PROCEDURE — 80076 HEPATIC FUNCTION PANEL: CPT

## 2021-09-22 PROCEDURE — 82607 VITAMIN B-12: CPT

## 2021-09-22 PROCEDURE — 86225 DNA ANTIBODY NATIVE: CPT

## 2021-09-22 PROCEDURE — 82164 ANGIOTENSIN I ENZYME TEST: CPT

## 2021-09-22 PROCEDURE — 84520 ASSAY OF UREA NITROGEN: CPT

## 2021-09-22 PROCEDURE — 82310 ASSAY OF CALCIUM: CPT

## 2021-09-22 PROCEDURE — 84165 PROTEIN E-PHORESIS SERUM: CPT | Performed by: PATHOLOGY

## 2021-09-22 PROCEDURE — 86140 C-REACTIVE PROTEIN: CPT

## 2021-09-22 PROCEDURE — 86235 NUCLEAR ANTIGEN ANTIBODY: CPT

## 2021-09-22 PROCEDURE — 83825 ASSAY OF MERCURY: CPT

## 2021-09-22 PROCEDURE — 84100 ASSAY OF PHOSPHORUS: CPT

## 2021-09-22 PROCEDURE — 83970 ASSAY OF PARATHORMONE: CPT

## 2021-09-22 PROCEDURE — 36415 COLL VENOUS BLD VENIPUNCTURE: CPT

## 2021-09-22 PROCEDURE — 82947 ASSAY GLUCOSE BLOOD QUANT: CPT

## 2021-09-22 PROCEDURE — 86038 ANTINUCLEAR ANTIBODIES: CPT

## 2021-09-22 PROCEDURE — 84165 PROTEIN E-PHORESIS SERUM: CPT

## 2021-09-22 PROCEDURE — 85652 RBC SED RATE AUTOMATED: CPT

## 2021-09-22 PROCEDURE — 85025 COMPLETE CBC W/AUTO DIFF WBC: CPT

## 2021-09-22 PROCEDURE — 82175 ASSAY OF ARSENIC: CPT

## 2021-09-23 ENCOUNTER — APPOINTMENT (OUTPATIENT)
Dept: LAB | Facility: CLINIC | Age: 56
End: 2021-09-23
Payer: COMMERCIAL

## 2021-09-23 DIAGNOSIS — Z51.81 ENCOUNTER FOR THERAPEUTIC DRUG MONITORING: ICD-10-CM

## 2021-09-23 LAB
ACE SERPL-CCNC: 29 U/L (ref 14–82)
ALBUMIN SERPL ELPH-MCNC: 4.76 G/DL (ref 3.5–5)
ALBUMIN SERPL ELPH-MCNC: 58.1 % (ref 52–65)
ALPHA1 GLOB SERPL ELPH-MCNC: 0.24 G/DL (ref 0.1–0.4)
ALPHA1 GLOB SERPL ELPH-MCNC: 2.9 % (ref 2.5–5)
ALPHA2 GLOB SERPL ELPH-MCNC: 0.61 G/DL (ref 0.4–1.2)
ALPHA2 GLOB SERPL ELPH-MCNC: 7.4 % (ref 7–13)
B BURGDOR IGG+IGM SER-ACNC: 33
BASOPHILS # BLD AUTO: 0.02 THOUSANDS/ΜL (ref 0–0.1)
BASOPHILS NFR BLD AUTO: 1 % (ref 0–1)
BETA GLOB ABNORMAL SERPL ELPH-MCNC: 0.43 G/DL (ref 0.4–0.8)
BETA1 GLOB SERPL ELPH-MCNC: 5.3 % (ref 5–13)
BETA2 GLOB SERPL ELPH-MCNC: 5.7 % (ref 2–8)
BETA2+GAMMA GLOB SERPL ELPH-MCNC: 0.47 G/DL (ref 0.2–0.5)
DSDNA AB SER-ACNC: <1 IU/ML (ref 0–9)
ENA SS-A AB SER-ACNC: <0.2 AI (ref 0–0.9)
ENA SS-B AB SER-ACNC: <0.2 AI (ref 0–0.9)
EOSINOPHIL # BLD AUTO: 0.19 THOUSAND/ΜL (ref 0–0.61)
EOSINOPHIL NFR BLD AUTO: 5 % (ref 0–6)
ERYTHROCYTE [DISTWIDTH] IN BLOOD BY AUTOMATED COUNT: 12.3 % (ref 11.6–15.1)
FERRITIN SERPL-MCNC: 141 NG/ML (ref 8–388)
GAMMA GLOB ABNORMAL SERPL ELPH-MCNC: 1.69 G/DL (ref 0.5–1.6)
GAMMA GLOB SERPL ELPH-MCNC: 20.6 % (ref 12–22)
HCT VFR BLD AUTO: 40.9 % (ref 36.5–49.3)
HGB BLD-MCNC: 13 G/DL (ref 12–17)
IGG/ALB SER: 1.39 {RATIO} (ref 1.1–1.8)
IMM GRANULOCYTES # BLD AUTO: 0.01 THOUSAND/UL (ref 0–0.2)
IMM GRANULOCYTES NFR BLD AUTO: 0 % (ref 0–2)
IRON SATN MFR SERPL: 39 % (ref 20–50)
IRON SERPL-MCNC: 101 UG/DL (ref 65–175)
LYMPHOCYTES # BLD AUTO: 1.71 THOUSANDS/ΜL (ref 0.6–4.47)
LYMPHOCYTES NFR BLD AUTO: 44 % (ref 14–44)
MCH RBC QN AUTO: 30.8 PG (ref 26.8–34.3)
MCHC RBC AUTO-ENTMCNC: 31.8 G/DL (ref 31.4–37.4)
MCV RBC AUTO: 97 FL (ref 82–98)
MONOCYTES # BLD AUTO: 0.33 THOUSAND/ΜL (ref 0.17–1.22)
MONOCYTES NFR BLD AUTO: 9 % (ref 4–12)
NEUTROPHILS # BLD AUTO: 1.57 THOUSANDS/ΜL (ref 1.85–7.62)
NEUTS SEG NFR BLD AUTO: 41 % (ref 43–75)
NRBC BLD AUTO-RTO: 0 /100 WBCS
PLATELET # BLD AUTO: 164 THOUSANDS/UL (ref 149–390)
PMV BLD AUTO: 12.6 FL (ref 8.9–12.7)
PROT PATTERN SERPL ELPH-IMP: ABNORMAL
PROT SERPL-MCNC: 8.2 G/DL (ref 6.4–8.2)
RBC # BLD AUTO: 4.22 MILLION/UL (ref 3.88–5.62)
TIBC SERPL-MCNC: 257 UG/DL (ref 250–450)
WBC # BLD AUTO: 3.83 THOUSAND/UL (ref 4.31–10.16)

## 2021-09-23 PROCEDURE — 83540 ASSAY OF IRON: CPT

## 2021-09-23 PROCEDURE — 85025 COMPLETE CBC W/AUTO DIFF WBC: CPT

## 2021-09-23 PROCEDURE — 82728 ASSAY OF FERRITIN: CPT

## 2021-09-23 PROCEDURE — 84165 PROTEIN E-PHORESIS SERUM: CPT | Performed by: PATHOLOGY

## 2021-09-23 PROCEDURE — 36415 COLL VENOUS BLD VENIPUNCTURE: CPT

## 2021-09-23 PROCEDURE — 84165 PROTEIN E-PHORESIS SERUM: CPT

## 2021-09-23 PROCEDURE — 83550 IRON BINDING TEST: CPT

## 2021-09-24 LAB
CA TITR SERPL AGGL: NEGATIVE {TITER}
RYE IGE QN: NEGATIVE

## 2021-09-25 LAB
ALBUMIN SERPL ELPH-MCNC: 4.69 G/DL (ref 3.5–5)
ALBUMIN SERPL ELPH-MCNC: 57.9 % (ref 52–65)
ALPHA1 GLOB SERPL ELPH-MCNC: 0.24 G/DL (ref 0.1–0.4)
ALPHA1 GLOB SERPL ELPH-MCNC: 3 % (ref 2.5–5)
ALPHA2 GLOB SERPL ELPH-MCNC: 0.62 G/DL (ref 0.4–1.2)
ALPHA2 GLOB SERPL ELPH-MCNC: 7.6 % (ref 7–13)
BETA GLOB ABNORMAL SERPL ELPH-MCNC: 0.45 G/DL (ref 0.4–0.8)
BETA1 GLOB SERPL ELPH-MCNC: 5.5 % (ref 5–13)
BETA2 GLOB SERPL ELPH-MCNC: 5.7 % (ref 2–8)
BETA2+GAMMA GLOB SERPL ELPH-MCNC: 0.46 G/DL (ref 0.2–0.5)
GAMMA GLOB ABNORMAL SERPL ELPH-MCNC: 1.64 G/DL (ref 0.5–1.6)
GAMMA GLOB SERPL ELPH-MCNC: 20.3 % (ref 12–22)
IGG/ALB SER: 1.38 {RATIO} (ref 1.1–1.8)
PROT PATTERN SERPL ELPH-IMP: ABNORMAL
PROT SERPL-MCNC: 8.1 G/DL (ref 6.4–8.2)

## 2021-09-28 LAB
ARSENIC BLD-MCNC: 1 UG/L (ref 2–23)
CRYOGLOB SER QL 1D COLD INC: NORMAL
LEAD BLD-MCNC: 1 UG/DL (ref 0–4)
MERCURY BLD-MCNC: 3.8 UG/L (ref 0–14.9)

## 2021-10-11 ENCOUNTER — APPOINTMENT (OUTPATIENT)
Dept: LAB | Facility: HOSPITAL | Age: 56
End: 2021-10-11
Payer: COMMERCIAL

## 2021-10-11 DIAGNOSIS — C61 PROSTATE CANCER (HCC): ICD-10-CM

## 2021-10-11 PROCEDURE — 36415 COLL VENOUS BLD VENIPUNCTURE: CPT

## 2021-10-11 PROCEDURE — 84153 ASSAY OF PSA TOTAL: CPT

## 2021-10-12 LAB — PSA SERPL-MCNC: <0.1 NG/ML (ref 0–4)

## 2021-10-13 ENCOUNTER — PROCEDURE VISIT (OUTPATIENT)
Dept: UROLOGY | Facility: CLINIC | Age: 56
End: 2021-10-13
Payer: COMMERCIAL

## 2021-10-13 VITALS
HEIGHT: 69 IN | WEIGHT: 214 LBS | HEART RATE: 68 BPM | BODY MASS INDEX: 31.7 KG/M2 | SYSTOLIC BLOOD PRESSURE: 130 MMHG | DIASTOLIC BLOOD PRESSURE: 68 MMHG

## 2021-10-13 DIAGNOSIS — N35.812 OTHER STRICTURE OF BULBOUS URETHRA IN MALE: Primary | ICD-10-CM

## 2021-10-13 DIAGNOSIS — N52.31 ERECTILE DYSFUNCTION AFTER RADICAL PROSTATECTOMY: ICD-10-CM

## 2021-10-13 DIAGNOSIS — Z71.2 ENCOUNTER TO DISCUSS TEST RESULTS: ICD-10-CM

## 2021-10-13 DIAGNOSIS — R39.198 SLOWING OF URINARY STREAM: ICD-10-CM

## 2021-10-13 DIAGNOSIS — C61 PROSTATE CANCER (HCC): ICD-10-CM

## 2021-10-13 PROCEDURE — 99214 OFFICE O/P EST MOD 30 MIN: CPT | Performed by: UROLOGY

## 2021-10-13 PROCEDURE — 52000 CYSTOURETHROSCOPY: CPT | Performed by: UROLOGY

## 2021-10-13 RX ORDER — ACETAMINOPHEN 325 MG/1
975 TABLET ORAL ONCE
Status: CANCELLED | OUTPATIENT
Start: 2021-10-13 | End: 2021-10-13

## 2021-10-13 RX ORDER — MELOXICAM 15 MG/1
15 TABLET ORAL DAILY
COMMUNITY
Start: 2021-09-23

## 2021-10-13 RX ORDER — ATORVASTATIN CALCIUM 10 MG/1
TABLET, FILM COATED ORAL
COMMUNITY

## 2021-10-13 RX ORDER — LORATADINE 10 MG/1
10 TABLET ORAL DAILY
COMMUNITY
Start: 2021-07-31

## 2021-10-13 RX ORDER — GABAPENTIN 100 MG/1
300 CAPSULE ORAL ONCE
Status: CANCELLED | OUTPATIENT
Start: 2021-10-13 | End: 2021-10-13

## 2021-10-14 ENCOUNTER — TELEPHONE (OUTPATIENT)
Dept: UROLOGY | Facility: CLINIC | Age: 56
End: 2021-10-14

## 2021-10-15 ENCOUNTER — PREP FOR PROCEDURE (OUTPATIENT)
Dept: UROLOGY | Facility: CLINIC | Age: 56
End: 2021-10-15

## 2021-10-15 DIAGNOSIS — N35.812 OTHER STRICTURE OF BULBOUS URETHRA IN MALE: Primary | ICD-10-CM

## 2021-11-13 DIAGNOSIS — R35.0 URINARY FREQUENCY: ICD-10-CM

## 2021-11-15 RX ORDER — OXYBUTYNIN CHLORIDE 5 MG/1
TABLET, EXTENDED RELEASE ORAL
Qty: 30 TABLET | Refills: 2 | Status: SHIPPED | OUTPATIENT
Start: 2021-11-15 | End: 2022-02-10

## 2022-01-03 ENCOUNTER — OFFICE VISIT (OUTPATIENT)
Dept: DERMATOLOGY | Facility: CLINIC | Age: 57
End: 2022-01-03
Payer: COMMERCIAL

## 2022-01-03 VITALS — BODY MASS INDEX: 33.29 KG/M2 | WEIGHT: 225.4 LBS | TEMPERATURE: 96.9 F

## 2022-01-03 DIAGNOSIS — L21.9 SEBORRHEIC DERMATITIS: ICD-10-CM

## 2022-01-03 DIAGNOSIS — L03.012: Primary | ICD-10-CM

## 2022-01-03 DIAGNOSIS — Z13.89 SCREENING FOR SKIN CONDITION: ICD-10-CM

## 2022-01-03 PROCEDURE — 99204 OFFICE O/P NEW MOD 45 MIN: CPT | Performed by: DERMATOLOGY

## 2022-01-03 RX ORDER — KETOCONAZOLE 20 MG/ML
1 SHAMPOO TOPICAL 2 TIMES WEEKLY
Qty: 120 ML | Refills: 3 | Status: SHIPPED | OUTPATIENT
Start: 2022-01-03

## 2022-01-03 RX ORDER — FLUTICASONE PROPIONATE 0.05 %
CREAM (GRAM) TOPICAL DAILY
Qty: 15 G | Refills: 1 | Status: SHIPPED | OUTPATIENT
Start: 2022-01-03 | End: 2022-05-23 | Stop reason: SDUPTHER

## 2022-01-03 NOTE — PROGRESS NOTES
[facility] DERMATOLOGY  [address] [place] [id number]  [phone]  [phone]     MRN: [id number] : 1965  Encounter: [id number]  Patient Information: Sandra Coffman February  Chief complaint: left thumb irritation and itching of scalp    History of present illness:  79-year-old male presents for 3 month history of irritation of his of proximal nail fold a with irritation he has had some exudate from the area has use saline and doxycycline with minimal improvement  Also complaining of irritation on the scalp  Past Medical History:   Diagnosis Date    Hypertension     Prostate cancer [facility])      Past Surgical History:   Procedure Laterality Date    COLONOSCOPY      WA LAP,PROSTATECTOMY,RADICAL,W/NERVE SPARE,INCL ROBOTIC N/A 2021    Procedure: ROBOTIC LAPAROSCOPIC RADICAL PROSTATECTOMY AND PELVIC LYMPH NODE DISSECTION;  Surgeon: Salinas Chaudhary MD;  Location: [facility];  Service: Urology     Social History   Social History     Substance and Sexual Activity   Alcohol Use Yes    Alcohol/week: 7 0 - 14 0 standard drinks    Types: 7 - 14 Shots of liquor per week    Comment: 1-2 shots of PIPPA nightly     Social History     Substance and Sexual Activity   Drug Use Not Currently     Social History     Tobacco Use   Smoking Status Former Smoker    Quit date:     Years since quitting: 10 0   Smokeless Tobacco Never Used     Family History   Problem Relation Age of Onset    Hypertension Father      Meds/Allergies   No Known Allergies    Meds:  Prior to Admission medications    Medication Sig Start Date End Date Taking?  Authorizing Provider   amLODIPine (NORVASC) 5 mg tablet Take 5 mg by mouth daily in the early morning    Yes Historical Provider, MD   atorvastatin (LIPITOR) 10 mg tablet atorvastatin 10 mg tablet   Yes Historical Provider, MD   docusate sodium (COLACE) 100 mg capsule Take 1 capsule (100 mg total) by mouth 3 (three) times a day for 14 days 6/7/21 1/3/22 Yes Pascale Davidson MD   esomeprazole (NexIUM) 40 MG capsule as needed   Yes Historical Provider, MD   fluticasone (FLONASE) 50 mcg/act nasal spray fluticasone propionate 50 mcg/actuation nasal spray,suspension   one puff each nostril daily prn     Yes Historical Provider, MD   loratadine (CLARITIN) 10 mg tablet Take 10 mg by mouth daily 7/31/21  Yes Historical Provider, MD   losartan (COZAAR) 100 MG tablet Take 100 mg by mouth daily in the early morning    Yes Historical Provider, MD   meloxicam (MOBIC) 15 mg tablet Take 15 mg by mouth daily 9/23/21  Yes Historical Provider, MD   multivitamin (THERAGRAN) TABS Take 1 tablet by mouth daily   Yes Historical Provider, MD   sildenafil (REVATIO) 20 mg tablet Take 1 tablet (20 mg total) by mouth daily 8/5/21  Yes Naeem Licea PA-C   oxybutynin (DITROPAN-XL) 5 mg 24 hr tablet TAKE 1 TABLET BY MOUTH EVERY DAY  Patient not taking: Reported on 1/3/2022 11/15/21   Naeem Licea PA-C   polyethylene glycol [facility]) 17 GM/SCOOP powder polyethylene glycol 3350 17 gram oral powder packet  Patient not taking: Reported on 7/29/2021    Historical Provider, MD   polyethylene glycol (MIRALAX) 17 g packet Take 17 g by mouth daily for 7 days  Patient not taking: Reported on 1/3/2022  6/7/21 6/14/21  Pascale Davidson MD   potassium chloride (MICRO-K) 10 MEQ CR capsule daily  Patient not taking: Reported on 7/29/2021 6/16/21   Historical Provider, MD       Subjective:     Review of Systems:    General: negative for - chills, fatigue, fever,  weight gain or weight loss  Psychological: negative for - anxiety, behavioral disorder, concentration difficulties, decreased libido, depression, irritability, memory difficulties, mood swings, sleep disturbances or suicidal ideation  ENT: negative for - hearing difficulties , nasal congestion, nasal discharge, oral lesions, sinus pain, sneezing, sore throat  Allergy and Immunology: negative for - hives, insect bite sensitivity,  Hematological and Lymphatic: negative for - bleeding problems, blood clots,bruising, swollen lymph nodes  Endocrine: negative for - hair pattern changes, hot flashes, malaise/lethargy, mood swings, palpitations, polydipsia/polyuria, skin changes, temperature intolerance or unexpected weight change  Respiratory: negative for - cough, hemoptysis, orthopnea, shortness of breath, or wheezing  Cardiovascular: negative for - chest pain, dyspnea on exertion, edema,  Gastrointestinal: negative for - abdominal pain, nausea/vomiting  Genito-Urinary: negative for - dysuria, incontinence, irregular/heavy menses or urinary frequency/urgency  Musculoskeletal: negative for - gait disturbance, joint pain, joint stiffness, joint swelling, muscle pain, muscular weakness  Dermatological:  As in HPI  Neurological: negative for confusion, dizziness, headaches, impaired coordination/balance, memory loss, numbness/tingling, seizures, speech problems, tremors or weakness       Objective:   Temp (!) 96 9 °F (36 1 °C) (Temporal)   Wt 102 kg (225 lb 6 4 oz)   BMI 33 29 kg/m²     Physical Exam:    General Appearance:    Alert, cooperative, no distress   Head:    Normocephalic, without obvious abnormality, atraumatic           Skin:   A full skin exam was performed including scalp, head scalp, eyes, ears, nose, lips, neck, chest, axilla, abdomen, back, buttocks, bilateral upper extremities, bilateral lower extremities, hands, feet, fingers, toes, fingernails, and toenails loss of cuticle noted on the left thumbnail with irritation noted minimal inflammation at this time also some scaling on the scalp     Assessment:     1  Chronic paronychia of left thumb     2  Seborrheic dermatitis     3   Screening for skin condition           Plan:   Chronic paronychia left thumb probably related to a skin continued moisture and irritation in the area which is destroy the cuticle allowing of pocket in the nail fold to become irritated with external irritants such as soap and water at present advised patient to a keep the area completely dry so that the cuticle could reform also will go ahead treat with a topical corticosteroid to reduce the amount of inflammation  Seborrheic dermatitis on scalp will given ketoconazole shampoo to see if this will help nothing else of concern noted on complete exam    Julio Banks MD  1/3/2022,3:53 PM    Portions of the record may have been created with voice recognition software   Occasional wrong word or "sound a like" substitutions may have occurred due to the inherent limitations of voice recognition software   Read the chart carefully and recognize, using context, where substitutions have occurred

## 2022-01-03 NOTE — PATIENT INSTRUCTIONS
Chronic paronychia left thumb probably related to a skin continued moisture and irritation in the area which is destroy the cuticle allowing of pocket in the nail fold to become irritated with external irritants such as soap and water at present advised patient to a keep the area completely dry so that the cuticle could reform also will go ahead treat with a topical corticosteroid to reduce the amount of inflammation  Seborrheic dermatitis on scalp will given ketoconazole shampoo to see if this will help nothing else of concern noted on complete exam

## 2022-02-02 ENCOUNTER — TELEPHONE (OUTPATIENT)
Dept: OTHER | Facility: OTHER | Age: 57
End: 2022-02-02

## 2022-02-04 NOTE — TELEPHONE ENCOUNTER
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

## 2022-05-23 DIAGNOSIS — L03.012: ICD-10-CM

## 2022-05-24 RX ORDER — FLUTICASONE PROPIONATE 0.05 %
CREAM (GRAM) TOPICAL DAILY
Qty: 15 G | Refills: 0 | Status: SHIPPED | OUTPATIENT
Start: 2022-05-24

## 2022-07-18 NOTE — NURSING NOTE
It was noted patient began having an increase in MAXWELL output and decreased output from the Calixto catheter  MAXWELL output ranged from 85/90/65 Patient's wife had stated Urology PA went in and changed the location of the catheter and used tape  Traction was noted to the catheter tubing  On call Urology PA made aware  Flushed Calixto catheter with 120 ml of NS with no return  Bladder scanned patient and it showed 304 ml  Traction was removed and catheter was placed in a stat lock  No output was noted from Calixto catheter, just increased output from MAXWELL drain  Flushed Calixto catheter again with 30 ml and noted a small clot came out  Flushed catheter again with 60 ml and a large clot came out  Calixto catheter was then noted to be flowing orange colored urine  At 2130, urine output is noted of 1800  Calixto catheter continues to drain with no issues  MAXWELL drain continues to have increased output  Patient is resting comfortably in bed with no pain at this time  Will continue to monitor  Glycopyrrolate Counseling:  I discussed with the patient the risks of glycopyrrolate including but not limited to skin rash, drowsiness, dry mouth, difficulty urinating, and blurred vision.

## 2023-08-08 ENCOUNTER — TELEPHONE (OUTPATIENT)
Dept: UROLOGY | Facility: CLINIC | Age: 58
End: 2023-08-08

## 2023-08-08 NOTE — TELEPHONE ENCOUNTER
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 Bri Sharma is his doctor .   Dropped form w/ his nurse

## 2024-03-26 ENCOUNTER — TELEPHONE (OUTPATIENT)
Age: 59
End: 2024-03-26

## 2024-05-01 ENCOUNTER — LAB (OUTPATIENT)
Dept: LAB | Facility: HOSPITAL | Age: 59
End: 2024-05-01
Payer: COMMERCIAL

## 2024-05-01 ENCOUNTER — OFFICE VISIT (OUTPATIENT)
Dept: FAMILY MEDICINE CLINIC | Facility: CLINIC | Age: 59
End: 2024-05-01
Payer: COMMERCIAL

## 2024-05-01 VITALS
OXYGEN SATURATION: 100 % | DIASTOLIC BLOOD PRESSURE: 74 MMHG | TEMPERATURE: 97.8 F | HEIGHT: 68 IN | SYSTOLIC BLOOD PRESSURE: 126 MMHG | BODY MASS INDEX: 33.49 KG/M2 | HEART RATE: 86 BPM | RESPIRATION RATE: 16 BRPM | WEIGHT: 221 LBS

## 2024-05-01 DIAGNOSIS — Z76.89 ENCOUNTER TO ESTABLISH CARE: ICD-10-CM

## 2024-05-01 DIAGNOSIS — Z85.46 HISTORY OF PROSTATE CANCER: ICD-10-CM

## 2024-05-01 DIAGNOSIS — R39.9 LOWER URINARY TRACT SYMPTOMS (LUTS): ICD-10-CM

## 2024-05-01 DIAGNOSIS — Z90.79 S/P PROSTATECTOMY: ICD-10-CM

## 2024-05-01 DIAGNOSIS — Z76.89 ENCOUNTER TO ESTABLISH CARE: Primary | ICD-10-CM

## 2024-05-01 DIAGNOSIS — I10 PRIMARY HYPERTENSION: ICD-10-CM

## 2024-05-01 DIAGNOSIS — Z12.5 SCREENING FOR PROSTATE CANCER: ICD-10-CM

## 2024-05-01 DIAGNOSIS — E78.2 MIXED HYPERLIPIDEMIA: ICD-10-CM

## 2024-05-01 LAB
ALBUMIN SERPL-MCNC: 4.6 G/DL (ref 3.5–5.2)
ALBUMIN/GLOB SERPL: 1.4 G/DL
ALP SERPL-CCNC: 98 U/L (ref 39–117)
ALT SERPL W P-5'-P-CCNC: 31 U/L (ref 1–41)
ANION GAP SERPL CALCULATED.3IONS-SCNC: 11 MMOL/L (ref 5–15)
AST SERPL-CCNC: 24 U/L (ref 1–40)
BACTERIA UR QL AUTO: NORMAL /HPF
BASOPHILS # BLD AUTO: 0.03 10*3/MM3 (ref 0–0.2)
BASOPHILS NFR BLD AUTO: 0.6 % (ref 0–1.5)
BILIRUB SERPL-MCNC: 1 MG/DL (ref 0–1.2)
BILIRUB UR QL STRIP: NEGATIVE
BUN SERPL-MCNC: 15 MG/DL (ref 6–20)
BUN/CREAT SERPL: 15.8 (ref 7–25)
CALCIUM SPEC-SCNC: 9.2 MG/DL (ref 8.6–10.5)
CHLORIDE SERPL-SCNC: 103 MMOL/L (ref 98–107)
CHOLEST SERPL-MCNC: 136 MG/DL (ref 0–200)
CLARITY UR: CLEAR
CO2 SERPL-SCNC: 26 MMOL/L (ref 22–29)
COLOR UR: YELLOW
CREAT SERPL-MCNC: 0.95 MG/DL (ref 0.76–1.27)
DEPRECATED RDW RBC AUTO: 39.6 FL (ref 37–54)
EGFRCR SERPLBLD CKD-EPI 2021: 92.2 ML/MIN/1.73
EOSINOPHIL # BLD AUTO: 0.17 10*3/MM3 (ref 0–0.4)
EOSINOPHIL NFR BLD AUTO: 3.3 % (ref 0.3–6.2)
ERYTHROCYTE [DISTWIDTH] IN BLOOD BY AUTOMATED COUNT: 11.6 % (ref 12.3–15.4)
GLOBULIN UR ELPH-MCNC: 3.2 GM/DL
GLUCOSE SERPL-MCNC: 100 MG/DL (ref 65–99)
GLUCOSE UR STRIP-MCNC: NEGATIVE MG/DL
HBA1C MFR BLD: 5.1 % (ref 4.8–5.6)
HCT VFR BLD AUTO: 38.9 % (ref 37.5–51)
HDLC SERPL-MCNC: 63 MG/DL (ref 40–60)
HGB BLD-MCNC: 12.5 G/DL (ref 13–17.7)
HGB UR QL STRIP.AUTO: NEGATIVE
HYALINE CASTS UR QL AUTO: NORMAL /LPF
IMM GRANULOCYTES # BLD AUTO: 0.01 10*3/MM3 (ref 0–0.05)
IMM GRANULOCYTES NFR BLD AUTO: 0.2 % (ref 0–0.5)
KETONES UR QL STRIP: NEGATIVE
LDLC SERPL CALC-MCNC: 59 MG/DL (ref 0–100)
LDLC/HDLC SERPL: 0.93 {RATIO}
LEUKOCYTE ESTERASE UR QL STRIP.AUTO: NEGATIVE
LYMPHOCYTES # BLD AUTO: 1.73 10*3/MM3 (ref 0.7–3.1)
LYMPHOCYTES NFR BLD AUTO: 33.2 % (ref 19.6–45.3)
MCH RBC QN AUTO: 30.4 PG (ref 26.6–33)
MCHC RBC AUTO-ENTMCNC: 32.1 G/DL (ref 31.5–35.7)
MCV RBC AUTO: 94.6 FL (ref 79–97)
MONOCYTES # BLD AUTO: 0.65 10*3/MM3 (ref 0.1–0.9)
MONOCYTES NFR BLD AUTO: 12.5 % (ref 5–12)
NEUTROPHILS NFR BLD AUTO: 2.62 10*3/MM3 (ref 1.7–7)
NEUTROPHILS NFR BLD AUTO: 50.2 % (ref 42.7–76)
NITRITE UR QL STRIP: NEGATIVE
NRBC BLD AUTO-RTO: 0 /100 WBC (ref 0–0.2)
PH UR STRIP.AUTO: 6.5 [PH] (ref 5–8)
PLATELET # BLD AUTO: 178 10*3/MM3 (ref 140–450)
PMV BLD AUTO: 12.4 FL (ref 6–12)
POTASSIUM SERPL-SCNC: 3.6 MMOL/L (ref 3.5–5.2)
PROT SERPL-MCNC: 7.8 G/DL (ref 6–8.5)
PROT UR QL STRIP: ABNORMAL
PSA SERPL-MCNC: <0.014 NG/ML (ref 0–4)
RBC # BLD AUTO: 4.11 10*6/MM3 (ref 4.14–5.8)
RBC # UR STRIP: NORMAL /HPF
REF LAB TEST METHOD: NORMAL
SODIUM SERPL-SCNC: 140 MMOL/L (ref 136–145)
SP GR UR STRIP: 1.02 (ref 1–1.03)
SQUAMOUS #/AREA URNS HPF: NORMAL /HPF
TRIGL SERPL-MCNC: 71 MG/DL (ref 0–150)
TSH SERPL DL<=0.05 MIU/L-ACNC: 2.51 UIU/ML (ref 0.27–4.2)
UROBILINOGEN UR QL STRIP: ABNORMAL
VLDLC SERPL-MCNC: 14 MG/DL (ref 5–40)
WBC # UR STRIP: NORMAL /HPF
WBC NRBC COR # BLD AUTO: 5.21 10*3/MM3 (ref 3.4–10.8)

## 2024-05-01 PROCEDURE — 80061 LIPID PANEL: CPT

## 2024-05-01 PROCEDURE — 81001 URINALYSIS AUTO W/SCOPE: CPT

## 2024-05-01 PROCEDURE — 87086 URINE CULTURE/COLONY COUNT: CPT

## 2024-05-01 PROCEDURE — G0103 PSA SCREENING: HCPCS

## 2024-05-01 PROCEDURE — 36415 COLL VENOUS BLD VENIPUNCTURE: CPT

## 2024-05-01 PROCEDURE — 84443 ASSAY THYROID STIM HORMONE: CPT

## 2024-05-01 PROCEDURE — 80053 COMPREHEN METABOLIC PANEL: CPT

## 2024-05-01 PROCEDURE — 83036 HEMOGLOBIN GLYCOSYLATED A1C: CPT

## 2024-05-01 PROCEDURE — 85025 COMPLETE CBC W/AUTO DIFF WBC: CPT

## 2024-05-01 PROCEDURE — 99214 OFFICE O/P EST MOD 30 MIN: CPT | Performed by: STUDENT IN AN ORGANIZED HEALTH CARE EDUCATION/TRAINING PROGRAM

## 2024-05-01 RX ORDER — ATORVASTATIN CALCIUM 10 MG/1
10 TABLET, FILM COATED ORAL NIGHTLY
Qty: 90 TABLET | Refills: 2 | Status: SHIPPED | OUTPATIENT
Start: 2024-05-01

## 2024-05-01 RX ORDER — AMLODIPINE BESYLATE 5 MG/1
5 TABLET ORAL DAILY
Qty: 90 TABLET | Refills: 2 | Status: SHIPPED | OUTPATIENT
Start: 2024-05-01

## 2024-05-01 RX ORDER — TAMSULOSIN HYDROCHLORIDE 0.4 MG/1
1 CAPSULE ORAL DAILY
Qty: 30 CAPSULE | Refills: 2 | Status: SHIPPED | OUTPATIENT
Start: 2024-05-01

## 2024-05-01 RX ORDER — LOSARTAN POTASSIUM 100 MG/1
100 TABLET ORAL DAILY
Qty: 90 TABLET | Refills: 2 | Status: SHIPPED | OUTPATIENT
Start: 2024-05-01

## 2024-05-01 RX ORDER — FLUTICASONE PROPIONATE 50 MCG
2 SPRAY, SUSPENSION (ML) NASAL DAILY
Qty: 16 G | Refills: 2 | Status: SHIPPED | OUTPATIENT
Start: 2024-05-01

## 2024-05-01 NOTE — PROGRESS NOTES
"Chief Complaint  Establishing care for management of hypertension/medications    Subjective         Stewart Simpson February is a 58 y.o. male who presents to [facility]    58 years old comes to the clinic today to establish care.  Hypertension; controlled on losartan/amlodipine.    Hyperlipidemia; controlled on Lipitor    History of prostate cancer, s/p prostatectomy, patient did not have any other treatment.  Does report nocturia since prostatectomy but does not report any other acute complaint.    12+ review of systems are unremarkable otherwise.    Patient was seen at the urgent care for lower back pain which has already improved.    Physically active without any difficulty        Objective   Vital Signs:   Vitals:    05/01/24 1329   BP: 126/74   Pulse: 86   Resp: 16   Temp: 97.8 °F (36.6 °C)   SpO2: 100%   Weight: 100 kg (221 lb)   Height: 172.7 cm (68\")      Body mass index is 33.6 kg/m².   Wt Readings from Last 3 Encounters:   05/01/24 100 kg (221 lb)   03/25/24 102 kg (224 lb)   03/14/24 104 kg (228 lb 8 oz)      BP Readings from Last 3 Encounters:   05/01/24 126/74   03/25/24 145/85   03/14/24 152/78        Patient Care Team:  lAvino Marie MD as PCP - General (Family Medicine)     Physical Exam  Vitals reviewed.   Constitutional:       Appearance: Normal appearance. He is well-developed.   HENT:      Head: Normocephalic and atraumatic.      Right Ear: External ear normal.      Left Ear: External ear normal.      Mouth/Throat:      Pharynx: No oropharyngeal exudate.   Eyes:      Conjunctiva/sclera: Conjunctivae normal.      Pupils: Pupils are equal, round, and reactive to light.   Cardiovascular:      Rate and Rhythm: Normal rate and regular rhythm.      Heart sounds: No murmur heard.     No friction rub. No gallop.   Pulmonary:      Effort: Pulmonary effort is normal.      Breath sounds: Normal breath sounds. No wheezing or rhonchi.   Abdominal:      General: Bowel sounds " are normal. There is no distension.      Palpations: Abdomen is soft.      Tenderness: There is no abdominal tenderness.   Skin:     General: Skin is warm and dry.   Neurological:      Mental Status: He is alert and oriented to person, place, and time.      Cranial Nerves: No cranial nerve deficit.   Psychiatric:         Mood and Affect: Mood and affect normal.         Behavior: Behavior normal.         Thought Content: Thought content normal.         Judgment: Judgment normal.            BMI is >= 30 and <35. (Class 1 Obesity). The following options were offered after discussion;: weight loss educational material (shared in after visit summary), exercise counseling/recommendations, and nutrition counseling/recommendations              Assessment and Plan   Diagnoses and all orders for this visit:    1. Encounter to establish care (Primary)  -     TSH Rfx On Abnormal To Free T4; Future  -     CBC & Differential; Future  -     Comprehensive Metabolic Panel; Future  -     Hemoglobin A1c; Future  -     Lipid Panel; Future  -     Urinalysis With Microscopic - Urine, Clean Catch; Future    2. History of prostate cancer  -     PSA SCREENING; Future  -     TSH Rfx On Abnormal To Free T4; Future  -     CBC & Differential; Future  -     Comprehensive Metabolic Panel; Future  -     Hemoglobin A1c; Future  -     Lipid Panel; Future  -     Urinalysis With Microscopic - Urine, Clean Catch; Future    3. Screening for prostate cancer  -     PSA SCREENING; Future  -     TSH Rfx On Abnormal To Free T4; Future  -     CBC & Differential; Future  -     Comprehensive Metabolic Panel; Future  -     Hemoglobin A1c; Future  -     Lipid Panel; Future  -     Urinalysis With Microscopic - Urine, Clean Catch; Future  -     Urine Culture - Urine, Urine, Clean Catch; Future    4. Primary hypertension  Comments:  Chronic, controlled on Norvasc and losartan.  DASH diet recommended  Orders:  -     amLODIPine (NORVASC) 5 MG tablet; Take 1 tablet by  mouth Daily.  Dispense: 90 tablet; Refill: 2  -     atorvastatin (LIPITOR) 10 MG tablet; Take 1 tablet by mouth Every Night.  Dispense: 90 tablet; Refill: 2  -     losartan (COZAAR) 100 MG tablet; Take 1 tablet by mouth Daily.  Dispense: 90 tablet; Refill: 2  -     TSH Rfx On Abnormal To Free T4; Future  -     CBC & Differential; Future  -     Comprehensive Metabolic Panel; Future  -     Hemoglobin A1c; Future  -     Lipid Panel; Future  -     Urinalysis With Microscopic - Urine, Clean Catch; Future    5. S/P prostatectomy    6. Lower urinary tract symptoms (LUTS)  Comments:  flomax trial. may consider oxybutanine.  Likely due to prostatectomy  Orders:  -     tamsulosin (FLOMAX) 0.4 MG capsule 24 hr capsule; Take 1 capsule by mouth Daily.  Dispense: 30 capsule; Refill: 2    7. Mixed hyperlipidemia  Comments:  Chronic, controlled on Lipitor.  Lifestyle modifications discussed    Other orders  -     fluticasone (FLONASE) 50 MCG/ACT nasal spray; 2 sprays into the nostril(s) as directed by provider Daily.  Dispense: 16 g; Refill: 2      Patient had colonoscopy about 1 year ago and advised to repeat in 5 years, records needed.  Patient is aware    Tobacco Use: Low Risk  (5/1/2024)    Patient History     Smoking Tobacco Use: Never     Smokeless Tobacco Use: Never     Passive Exposure: Never            Follow Up   Return in about 6 months (around 11/1/2024) for Next scheduled follow up.  Patient was given instructions and counseling regarding his condition or for health maintenance advice. Please see specific information pulled into the AVS if appropriate.

## 2024-05-03 ENCOUNTER — PATIENT ROUNDING (BHMG ONLY) (OUTPATIENT)
Dept: FAMILY MEDICINE CLINIC | Facility: CLINIC | Age: 59
End: 2024-05-03
Payer: COMMERCIAL

## 2024-05-03 LAB — BACTERIA SPEC AEROBE CULT: NO GROWTH

## 2025-01-27 DIAGNOSIS — I10 PRIMARY HYPERTENSION: ICD-10-CM

## 2025-01-27 RX ORDER — LOSARTAN POTASSIUM 100 MG/1
100 TABLET ORAL DAILY
Qty: 90 TABLET | Refills: 1 | Status: SHIPPED | OUTPATIENT
Start: 2025-01-27

## 2025-03-14 DIAGNOSIS — I10 PRIMARY HYPERTENSION: ICD-10-CM

## 2025-03-14 RX ORDER — AMLODIPINE BESYLATE 5 MG/1
5 TABLET ORAL DAILY
Qty: 90 TABLET | Refills: 1 | Status: SHIPPED | OUTPATIENT
Start: 2025-03-14

## 2025-03-31 DIAGNOSIS — I10 PRIMARY HYPERTENSION: ICD-10-CM

## 2025-03-31 RX ORDER — ATORVASTATIN CALCIUM 10 MG/1
10 TABLET, FILM COATED ORAL NIGHTLY
Qty: 90 TABLET | Refills: 1 | Status: SHIPPED | OUTPATIENT
Start: 2025-03-31

## (undated) DEVICE — KERLIX BANDAGE ROLL: Brand: KERLIX

## (undated) DEVICE — JP CHANNEL DRAIN 19FR, FULL FLUTES: Brand: JACKSON-PRATT

## (undated) DEVICE — AIRSEAL TUBE SMOKE EVAC LUMENX3 FILTERED

## (undated) DEVICE — SYRINGE 10ML LL

## (undated) DEVICE — HEMOSTATIC MATRIX SURGIFLO 8ML W/THROMBIN

## (undated) DEVICE — 3M™ DURAPORE™ SURGICAL TAPE 1538-3, 3 INCH X 10 YARD (7,5CM X 9,1M), 4 ROLLS/BOX: Brand: 3M™ DURAPORE™

## (undated) DEVICE — PREMIUM DRY TRAY LF: Brand: MEDLINE INDUSTRIES, INC.

## (undated) DEVICE — TIP COVER ACCESSORY

## (undated) DEVICE — POSITIONER EGGCRATE

## (undated) DEVICE — CATH FOLEY COUNCIL 18FR 5ML 2 WAY LUBRICATH

## (undated) DEVICE — KIT, BETHLEHEM ROBOTIC PROST: Brand: CARDINAL HEALTH

## (undated) DEVICE — ULNAR NERVE PROTECTOR FOAM POSITIONER: Brand: CARDINAL HEALTH

## (undated) DEVICE — DRAPE SHEET THREE QUARTER

## (undated) DEVICE — CATH FOLEY COUDE 18FR 5ML 2 WAY TIEMANN LUBRICATH

## (undated) DEVICE — CHLORHEXIDINE 4PCT 4 OZ

## (undated) DEVICE — DRAIN SPONGES,6 PLY: Brand: EXCILON

## (undated) DEVICE — SURGICEL 4 X 8

## (undated) DEVICE — 3M™ TEGADERM™ TRANSPARENT FILM DRESSING FRAME STYLE, 1626W, 4 IN X 4-3/4 IN (10 CM X 12 CM), 50/CT 4CT/CASE: Brand: 3M™ TEGADERM™

## (undated) DEVICE — VESSEL SEALER EXTEND: Brand: ENDOWRIST

## (undated) DEVICE — SUT ETHILON 3-0 FS-1 18 IN 663G

## (undated) DEVICE — CHLORAPREP HI-LITE 26ML ORANGE

## (undated) DEVICE — LARGE NEEDLE DRIVER: Brand: ENDOWRIST

## (undated) DEVICE — TISSUE RETRIEVAL SYSTEM: Brand: INZII RETRIEVAL SYSTEM

## (undated) DEVICE — VISUALIZATION SYSTEM: Brand: CLEARIFY

## (undated) DEVICE — SUT VICRYL 0 UR-6 27 IN J603H

## (undated) DEVICE — INTENDED FOR TISSUE SEPARATION, AND OTHER PROCEDURES THAT REQUIRE A SHARP SURGICAL BLADE TO PUNCTURE OR CUT.: Brand: BARD-PARKER SAFETY BLADES SIZE 11, STERILE

## (undated) DEVICE — BLADELESS OBTURATOR: Brand: WECK VISTA

## (undated) DEVICE — PROGRASP FORCEPS: Brand: ENDOWRIST

## (undated) DEVICE — TROCAR PORT ACCESS 12 X120MM W/BLDLS OPTICAL TIP AIRSEAL

## (undated) DEVICE — STERILE SURGICAL LUBRICANT,  TUBE: Brand: SURGILUBE

## (undated) DEVICE — JACKSON-PRATT 100CC BULB RESERVOIR: Brand: CARDINAL HEALTH

## (undated) DEVICE — HEM-O-LOK CLIP CARTRIDGE LARGE DA VINCI SI/XI

## (undated) DEVICE — TROCAR: Brand: KII FIOS FIRST ENTRY

## (undated) DEVICE — VIAL DECANTER

## (undated) DEVICE — ARM DRAPE

## (undated) DEVICE — SUT VICRYL 2-0 CT-1 27 IN J259H

## (undated) DEVICE — SUT MONOCRYL 4-0 PS-2 27 IN Y426H

## (undated) DEVICE — COLUMN DRAPE

## (undated) DEVICE — URIMETER 2500ML

## (undated) DEVICE — NEEDLE 25G X 1 1/2

## (undated) DEVICE — GLOVE INDICATOR PI UNDERGLOVE SZ 8 BLUE

## (undated) DEVICE — CANNULA SEAL

## (undated) DEVICE — ADHESIVE SKIN HIGH VISCOSITY EXOFIN 1ML

## (undated) DEVICE — LIGHT HANDLE COVER SLEEVE DISP BLUE STELLAR

## (undated) DEVICE — SUT PDS II 0 CT-1 27 IN Z340H

## (undated) DEVICE — ASTOUND STANDARD SURGICAL GOWN, XL: Brand: CONVERTORS

## (undated) DEVICE — SUT VLOC 90 3-0 CV-23 9 IN VLOCM0844

## (undated) DEVICE — ENDOPATH PNEUMONEEDLE INSUFFLATION NEEDLES WITH LUER LOCK CONNECTORS 120MM: Brand: ENDOPATH

## (undated) DEVICE — SUT VLOC 90 3-0 90 CV-23 L9 IN VLOCM1944

## (undated) DEVICE — CATH FOLEY COUNCIL 20FR 5ML 2 WAY LUBRICATH

## (undated) DEVICE — GLOVE SRG BIOGEL ECLIPSE 7.5

## (undated) DEVICE — MONOPOLAR CURVED SCISSORS: Brand: ENDOWRIST

## (undated) DEVICE — MARYLAND BIPOLAR FORCEPS: Brand: ENDOWRIST